# Patient Record
Sex: FEMALE | Race: WHITE | Employment: UNEMPLOYED | URBAN - METROPOLITAN AREA
[De-identification: names, ages, dates, MRNs, and addresses within clinical notes are randomized per-mention and may not be internally consistent; named-entity substitution may affect disease eponyms.]

---

## 2018-07-11 ENCOUNTER — HOSPITAL ENCOUNTER (EMERGENCY)
Age: 18
Discharge: HOME | End: 2018-07-11
Payer: MEDICARE
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
## 2018-08-01 ENCOUNTER — APPOINTMENT (OUTPATIENT)
Dept: GENERAL RADIOLOGY | Age: 18
End: 2018-08-01
Payer: MEDICARE

## 2018-08-01 ENCOUNTER — HOSPITAL ENCOUNTER (EMERGENCY)
Age: 18
Discharge: HOME | End: 2018-08-01
Payer: MEDICARE

## 2018-08-01 VITALS
OXYGEN SATURATION: 96 % | BODY MASS INDEX: 33.31 KG/M2 | RESPIRATION RATE: 16 BRPM | SYSTOLIC BLOOD PRESSURE: 113 MMHG | HEART RATE: 90 BPM | DIASTOLIC BLOOD PRESSURE: 61 MMHG | WEIGHT: 181 LBS | TEMPERATURE: 98 F | HEIGHT: 62 IN

## 2018-08-01 DIAGNOSIS — S82.202A CLOSED FRACTURE OF LEFT TIBIA AND FIBULA, INITIAL ENCOUNTER: Primary | ICD-10-CM

## 2018-08-01 DIAGNOSIS — S82.402A CLOSED FRACTURE OF LEFT TIBIA AND FIBULA, INITIAL ENCOUNTER: Primary | ICD-10-CM

## 2018-08-01 PROCEDURE — 99283 EMERGENCY DEPT VISIT LOW MDM: CPT

## 2018-08-01 PROCEDURE — 73610 X-RAY EXAM OF ANKLE: CPT

## 2018-08-01 PROCEDURE — 29515 APPLICATION SHORT LEG SPLINT: CPT

## 2018-08-01 ASSESSMENT — PAIN DESCRIPTION - PAIN TYPE: TYPE: ACUTE PAIN

## 2018-08-01 ASSESSMENT — PAIN DESCRIPTION - DESCRIPTORS: DESCRIPTORS: ACHING

## 2018-08-01 ASSESSMENT — PAIN SCALES - GENERAL: PAINLEVEL_OUTOF10: 7

## 2018-08-01 ASSESSMENT — PAIN DESCRIPTION - LOCATION: LOCATION: ANKLE

## 2018-08-01 ASSESSMENT — PAIN DESCRIPTION - ORIENTATION: ORIENTATION: LEFT

## 2018-08-01 NOTE — ED NOTES
Discharge instructions given, medications and follow up instructions reviewed. Patient verbalized understanding, no other noted or stated problems at this time. Patient will follow up with physicians as directed.       Minal Sanchez RN  08/01/18 1448

## 2018-12-17 ENCOUNTER — HOSPITAL ENCOUNTER (EMERGENCY)
Age: 18
Discharge: HOME | End: 2018-12-17
Payer: MEDICARE

## 2018-12-17 ENCOUNTER — APPOINTMENT (OUTPATIENT)
Dept: GENERAL RADIOLOGY | Age: 18
End: 2018-12-17
Payer: MEDICARE

## 2018-12-17 VITALS
HEIGHT: 63 IN | HEART RATE: 86 BPM | WEIGHT: 160 LBS | OXYGEN SATURATION: 98 % | SYSTOLIC BLOOD PRESSURE: 104 MMHG | BODY MASS INDEX: 28.35 KG/M2 | DIASTOLIC BLOOD PRESSURE: 78 MMHG | RESPIRATION RATE: 16 BRPM | TEMPERATURE: 97.9 F

## 2018-12-17 DIAGNOSIS — R07.89 CHEST WALL PAIN: Primary | ICD-10-CM

## 2018-12-17 DIAGNOSIS — R05.9 COUGH: ICD-10-CM

## 2018-12-17 LAB
EKG ATRIAL RATE: 76 BPM
EKG P AXIS: 35 DEGREES
EKG P-R INTERVAL: 128 MS
EKG Q-T INTERVAL: 404 MS
EKG QRS DURATION: 92 MS
EKG QTC CALCULATION (BAZETT): 454 MS
EKG R AXIS: 50 DEGREES
EKG T AXIS: 52 DEGREES
EKG VENTRICULAR RATE: 76 BPM
HCG(URINE) PREGNANCY TEST: NEGATIVE

## 2018-12-17 PROCEDURE — 71046 X-RAY EXAM CHEST 2 VIEWS: CPT

## 2018-12-17 PROCEDURE — 81025 URINE PREGNANCY TEST: CPT

## 2018-12-17 PROCEDURE — 6360000002 HC RX W HCPCS: Performed by: PHYSICIAN ASSISTANT

## 2018-12-17 PROCEDURE — 96372 THER/PROPH/DIAG INJ SC/IM: CPT

## 2018-12-17 PROCEDURE — 99285 EMERGENCY DEPT VISIT HI MDM: CPT

## 2018-12-17 RX ORDER — KETOROLAC TROMETHAMINE 30 MG/ML
30 INJECTION, SOLUTION INTRAMUSCULAR; INTRAVENOUS ONCE
Status: COMPLETED | OUTPATIENT
Start: 2018-12-17 | End: 2018-12-17

## 2018-12-17 RX ORDER — LEVALBUTEROL INHALATION SOLUTION 0.63 MG/3ML
0.63 SOLUTION RESPIRATORY (INHALATION) ONCE
Status: COMPLETED | OUTPATIENT
Start: 2018-12-17 | End: 2018-12-17

## 2018-12-17 RX ORDER — BENZONATATE 100 MG/1
100 CAPSULE ORAL 3 TIMES DAILY PRN
Qty: 20 CAPSULE | Refills: 0 | Status: SHIPPED | OUTPATIENT
Start: 2018-12-17 | End: 2018-12-24

## 2018-12-17 RX ORDER — NAPROXEN 500 MG/1
500 TABLET ORAL 2 TIMES DAILY
Qty: 20 TABLET | Refills: 0 | Status: SHIPPED | OUTPATIENT
Start: 2018-12-17 | End: 2020-06-16

## 2018-12-17 RX ORDER — LEVALBUTEROL INHALATION SOLUTION 1.25 MG/3ML
SOLUTION RESPIRATORY (INHALATION)
Status: DISCONTINUED
Start: 2018-12-17 | End: 2018-12-17 | Stop reason: HOSPADM

## 2018-12-17 RX ADMIN — KETOROLAC TROMETHAMINE 30 MG: 30 INJECTION, SOLUTION INTRAMUSCULAR at 21:10

## 2018-12-17 RX ADMIN — LEVALBUTEROL HYDROCHLORIDE 0.63 MG: 0.63 SOLUTION RESPIRATORY (INHALATION) at 20:55

## 2018-12-17 ASSESSMENT — PAIN DESCRIPTION - DESCRIPTORS: DESCRIPTORS: SHARP;BURNING

## 2018-12-17 ASSESSMENT — PAIN SCALES - GENERAL
PAINLEVEL_OUTOF10: 6
PAINLEVEL_OUTOF10: 6

## 2018-12-17 ASSESSMENT — PAIN DESCRIPTION - LOCATION: LOCATION: CHEST

## 2018-12-17 ASSESSMENT — PAIN DESCRIPTION - PAIN TYPE: TYPE: ACUTE PAIN

## 2018-12-18 NOTE — ED PROVIDER NOTES
4. No pitting edema to lower extremities bilaterally. Upper and lower extremities warm to touch. No bilateral posterior calf tenderness to palpation. Warm and well perfused  Skin: warm and dry without rash  Neurologic: GCS 15  Psych: Normal Affect      ------------------------------ ED COURSE/MEDICAL DECISION MAKING----------------------  Medications   levalbuterol (XOPENEX) 1.25 MG/3ML nebulizer solution (not administered)   levalbuterol (XOPENEX) nebulization 0.63 mg (0.63 mg Nebulization Given 12/17/18 2055)   ketorolac (TORADOL) injection 30 mg (30 mg Intramuscular Given 12/17/18 2110)       Medical Decision Making:    Patient to ER with complaints of anterior chest discomfort to local palpation onset around 3 PM today when she awoke from sleep. Patient advised would recommend EKG, urine pregnancy test this patient cannot be sure whether or not she is pregnant. I did tell patient I would like to give her a nebulizer treatment, she states that she thinks she is allergic to regular albuterol- it gives her nausea and vomiting. Patient advised if negative pregnancy test, will check 2 view CXR and consider IM Toradol. EKG done and stable/normal.  Patient getting nebulizer treatment on my reevaluation. Patient still having some palpable anterior chest soreness. Patient advised that EKG is stable and her pregnancy test is negative. Will check 2 view chest x-ray as well as give dose of IM Toradol now. 9:30PM Patient advised of stable CXR findings. Patient received some relief from Toradol given in ER. Will give Rx for Tessalon perles and Naprosyn, take both as needed. Recommend to establish with PCP and patient advised to stop smoking. Patient aware that her current symptoms do not appear from her heart, but appear from the muscles in her chest- most likely due to her recent mild cough. Counseling:    The emergency provider has spoken with the patient and discussed todays results, in addition to

## 2020-05-28 NOTE — ED PROVIDER NOTES
[facility]         Department of Emergency Medicine   ED  Provider Note  Admit Date/RoomTime: 8/1/2018 11:49 AM  ED Room: 24/24  Chief Complaint   Ankle Pain (was dx with fx 7/12 by [facility] and took off soft splint yesterday due to pain)    History of Present Illness   Source of history provided by:  patient. History/Exam Limitations: none. Kirk Brink is a 21 y.o. old female who has a past medical history of:   Past Medical History:   Diagnosis Date    ADHD     Autism     presents to the emergency department , for Left foot and ankle pain which occured 7/12/18 as result of a fall. She was taken to [facility] ER where xrays revealed a distal fibular fracture, was placed in a posteror splint, crutches and sent home with Ultram.  She was referred to Dr. Ava Angulo who she claims would not accept her unless she was referred by a primary care provider. Today she sts her leg hurt so bad she removed her splint and called 911. She denies any new trauma since her original injury. ROS    Pertinent positives and negatives are stated within HPI, all other systems reviewed and are negative. History reviewed. No pertinent surgical history. Social History:  reports that she has never smoked. She has never used smokeless tobacco. She reports that she does not drink alcohol or use drugs. Family History: family history is not on file. Allergies: Patient has no known allergies. Physical Exam           ED Triage Vitals [08/01/18 1148]   BP Temp Temp Source Pulse Resp SpO2 Height Weight   113/61 98 °F (36.7 °C) Oral 90 16 96 % 5' 2\" (1.575 m) 181 lb (82.1 kg)      Oxygen Saturation Interpretation: Normal.    · Constitutional:  Alert, development consistent with age. · HEENT:  NC/NT. Airway patent. · Neck:  Normal ROM. Supple. · Extremity(s):  Left: lateral mal..                Tenderness:  moderate. Swelling: Mild. Calf:  Calf/Ankle edema is present. .              Deformity: No.
Home

## 2020-06-16 ENCOUNTER — HOSPITAL ENCOUNTER (EMERGENCY)
Age: 20
Discharge: HOME | End: 2020-06-16
Attending: EMERGENCY MEDICINE
Payer: MEDICAID

## 2020-06-16 ENCOUNTER — HOSPITAL ENCOUNTER (EMERGENCY)
Age: 20
Discharge: LEFT BEFORE DISCHARGE | End: 2020-06-16
Attending: EMERGENCY MEDICINE
Payer: MEDICAID

## 2020-06-16 VITALS
RESPIRATION RATE: 16 BRPM | HEIGHT: 62 IN | SYSTOLIC BLOOD PRESSURE: 122 MMHG | BODY MASS INDEX: 26.2 KG/M2 | HEART RATE: 120 BPM | OXYGEN SATURATION: 97 % | DIASTOLIC BLOOD PRESSURE: 66 MMHG | WEIGHT: 142.38 LBS | TEMPERATURE: 97.9 F

## 2020-06-16 VITALS
TEMPERATURE: 98.9 F | DIASTOLIC BLOOD PRESSURE: 73 MMHG | WEIGHT: 160 LBS | BODY MASS INDEX: 28.34 KG/M2 | RESPIRATION RATE: 14 BRPM | HEART RATE: 120 BPM | OXYGEN SATURATION: 96 % | SYSTOLIC BLOOD PRESSURE: 133 MMHG

## 2020-06-16 LAB
ACETAMINOPHEN LEVEL: <5 MCG/ML (ref 10–30)
ALBUMIN SERPL-MCNC: 4.4 G/DL (ref 3.5–5.2)
ALP BLD-CCNC: 46 U/L (ref 35–104)
ALT SERPL-CCNC: 14 U/L (ref 0–32)
AMPHETAMINE SCREEN, URINE: NOT DETECTED
ANION GAP SERPL CALCULATED.3IONS-SCNC: 12 MMOL/L (ref 7–16)
AST SERPL-CCNC: 20 U/L (ref 0–31)
BARBITURATE SCREEN URINE: NOT DETECTED
BASOPHILS ABSOLUTE: 0.03 E9/L (ref 0–0.2)
BASOPHILS RELATIVE PERCENT: 0.3 % (ref 0–2)
BENZODIAZEPINE SCREEN, URINE: NOT DETECTED
BILIRUB SERPL-MCNC: 0.4 MG/DL (ref 0–1.2)
BUN BLDV-MCNC: 11 MG/DL (ref 6–20)
CALCIUM SERPL-MCNC: 9.4 MG/DL (ref 8.6–10.2)
CANNABINOID SCREEN URINE: POSITIVE
CHLORIDE BLD-SCNC: 104 MMOL/L (ref 98–107)
CO2: 22 MMOL/L (ref 22–29)
COCAINE METABOLITE SCREEN URINE: NOT DETECTED
CREAT SERPL-MCNC: 0.6 MG/DL (ref 0.5–1)
EOSINOPHILS ABSOLUTE: 0.08 E9/L (ref 0.05–0.5)
EOSINOPHILS RELATIVE PERCENT: 0.9 % (ref 0–6)
ETHANOL: <10 MG/DL (ref 0–0.08)
FENTANYL SCREEN, URINE: NOT DETECTED
GFR AFRICAN AMERICAN: >60
GFR NON-AFRICAN AMERICAN: >60 ML/MIN/1.73
GLUCOSE BLD-MCNC: 113 MG/DL (ref 74–99)
HCG, URINE, POC: NEGATIVE
HCT VFR BLD CALC: 40.7 % (ref 34–48)
HEMOGLOBIN: 13.4 G/DL (ref 11.5–15.5)
IMMATURE GRANULOCYTES #: 0.05 E9/L
IMMATURE GRANULOCYTES %: 0.5 % (ref 0–5)
LYMPHOCYTES ABSOLUTE: 2.09 E9/L (ref 1.5–4)
LYMPHOCYTES RELATIVE PERCENT: 22.4 % (ref 20–42)
Lab: ABNORMAL
Lab: NORMAL
MCH RBC QN AUTO: 28.2 PG (ref 26–35)
MCHC RBC AUTO-ENTMCNC: 32.9 % (ref 32–34.5)
MCV RBC AUTO: 85.5 FL (ref 80–99.9)
METHADONE SCREEN, URINE: NOT DETECTED
MONOCYTES ABSOLUTE: 0.35 E9/L (ref 0.1–0.95)
MONOCYTES RELATIVE PERCENT: 3.8 % (ref 2–12)
NEGATIVE QC PASS/FAIL: NORMAL
NEUTROPHILS ABSOLUTE: 6.72 E9/L (ref 1.8–7.3)
NEUTROPHILS RELATIVE PERCENT: 72.1 % (ref 43–80)
OPIATE SCREEN URINE: NOT DETECTED
OXYCODONE URINE: NOT DETECTED
PDW BLD-RTO: 13.9 FL (ref 11.5–15)
PHENCYCLIDINE SCREEN URINE: NOT DETECTED
PLATELET # BLD: 266 E9/L (ref 130–450)
PMV BLD AUTO: 10.3 FL (ref 7–12)
POSITIVE QC PASS/FAIL: NORMAL
POTASSIUM SERPL-SCNC: 3.7 MMOL/L (ref 3.5–5)
RBC # BLD: 4.76 E12/L (ref 3.5–5.5)
SALICYLATE, SERUM: <0.3 MG/DL (ref 0–30)
SODIUM BLD-SCNC: 138 MMOL/L (ref 132–146)
TOTAL PROTEIN: 7.7 G/DL (ref 6.4–8.3)
TRICYCLIC ANTIDEPRESSANTS SCREEN SERUM: NEGATIVE NG/ML
WBC # BLD: 9.3 E9/L (ref 4.5–11.5)

## 2020-06-16 PROCEDURE — 80307 DRUG TEST PRSMV CHEM ANLYZR: CPT

## 2020-06-16 PROCEDURE — 99284 EMERGENCY DEPT VISIT MOD MDM: CPT

## 2020-06-16 PROCEDURE — G0480 DRUG TEST DEF 1-7 CLASSES: HCPCS

## 2020-06-16 PROCEDURE — 80053 COMPREHEN METABOLIC PANEL: CPT

## 2020-06-16 PROCEDURE — 85025 COMPLETE CBC W/AUTO DIFF WBC: CPT

## 2020-06-16 NOTE — ED PROVIDER NOTES
well perfused, no clubbing, cyanosis, or edema. Capillary refill <3 seconds  Skin: warm and dry without rash  Neurologic: GCS 15,  Psych: calm  Affect      ------------------------------------------ PROGRESS NOTES ------------------------------------------     Consultations:   Social work     Re-Evaluations:        Counseling: The emergency provider has spoken with thepatient and discussed todays results, in addition to providing specific details for the plan of care and counseling regarding the diagnosis and prognosis. Questions are answered at this time and they are agreeable with the plan.      --------------------------------- ADDITIONAL PROVIDER NOTES ---------------------------------     This patient's ED course included: a personal history and physicial eaxmination, multiple bedside re-evaluations, IV medications, cardiac monitoring, continuous pulse oximetry and complex medical decision making and emergency management    This patient has been closely monitored during their ED course.     --------------------------------- IMPRESSION AND DISPOSITION ---------------------------------    IMPRESSION  1. Polysubstance abuse ([facility] [address]        DISPOSITION  Disposition: as per consultation - medically clear for admission to psychiatric facility  Patient condition is stable    [unfilled]     Please note this note was transcribed using voice recognition software.  Every effort was to ensure accuracy however inadvertent transcription errors may be present       Ganesh Gómez DO  06/16/20 1170
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
## 2020-07-01 ENCOUNTER — HOSPITAL ENCOUNTER (OUTPATIENT)
Age: 20
Discharge: HOME | End: 2020-07-01
Payer: MEDICAID

## 2020-07-01 LAB
ALBUMIN SERPL-MCNC: 4.6 G/DL (ref 3.5–5.2)
ALP BLD-CCNC: 51 U/L (ref 35–104)
ALT SERPL-CCNC: 12 U/L (ref 0–32)
AMPHETAMINE SCREEN, URINE: NOT DETECTED
ANION GAP SERPL CALCULATED.3IONS-SCNC: 13 MMOL/L (ref 7–16)
AST SERPL-CCNC: 17 U/L (ref 0–31)
BARBITURATE SCREEN URINE: NOT DETECTED
BASOPHILS ABSOLUTE: 0.04 E9/L (ref 0–0.2)
BASOPHILS RELATIVE PERCENT: 0.5 % (ref 0–2)
BENZODIAZEPINE SCREEN, URINE: NOT DETECTED
BILIRUB SERPL-MCNC: 0.4 MG/DL (ref 0–1.2)
BUN BLDV-MCNC: 10 MG/DL (ref 6–20)
CALCIUM SERPL-MCNC: 9.6 MG/DL (ref 8.6–10.2)
CANNABINOID SCREEN URINE: POSITIVE
CHLORIDE BLD-SCNC: 100 MMOL/L (ref 98–107)
CO2: 25 MMOL/L (ref 22–29)
COCAINE METABOLITE SCREEN URINE: NOT DETECTED
CREAT SERPL-MCNC: 0.6 MG/DL (ref 0.5–1)
EOSINOPHILS ABSOLUTE: 0.07 E9/L (ref 0.05–0.5)
EOSINOPHILS RELATIVE PERCENT: 0.9 % (ref 0–6)
FENTANYL SCREEN, URINE: NOT DETECTED
GFR AFRICAN AMERICAN: >60
GFR NON-AFRICAN AMERICAN: >60 ML/MIN/1.73
GLUCOSE FASTING: 95 MG/DL (ref 74–99)
HCG QUALITATIVE: NEGATIVE
HCT VFR BLD CALC: 40.9 % (ref 34–48)
HEMOGLOBIN: 13.8 G/DL (ref 11.5–15.5)
IMMATURE GRANULOCYTES #: 0.03 E9/L
IMMATURE GRANULOCYTES %: 0.4 % (ref 0–5)
LYMPHOCYTES ABSOLUTE: 2.26 E9/L (ref 1.5–4)
LYMPHOCYTES RELATIVE PERCENT: 28.4 % (ref 20–42)
Lab: ABNORMAL
MCH RBC QN AUTO: 29.3 PG (ref 26–35)
MCHC RBC AUTO-ENTMCNC: 33.7 % (ref 32–34.5)
MCV RBC AUTO: 86.8 FL (ref 80–99.9)
METHADONE SCREEN, URINE: NOT DETECTED
MONOCYTES ABSOLUTE: 0.35 E9/L (ref 0.1–0.95)
MONOCYTES RELATIVE PERCENT: 4.4 % (ref 2–12)
NEUTROPHILS ABSOLUTE: 5.2 E9/L (ref 1.8–7.3)
NEUTROPHILS RELATIVE PERCENT: 65.4 % (ref 43–80)
OPIATE SCREEN URINE: NOT DETECTED
OXYCODONE URINE: NOT DETECTED
PDW BLD-RTO: 13.2 FL (ref 11.5–15)
PHENCYCLIDINE SCREEN URINE: NOT DETECTED
PLATELET # BLD: 213 E9/L (ref 130–450)
PMV BLD AUTO: 10.3 FL (ref 7–12)
POTASSIUM SERPL-SCNC: 4 MMOL/L (ref 3.5–5)
RBC # BLD: 4.71 E12/L (ref 3.5–5.5)
SODIUM BLD-SCNC: 138 MMOL/L (ref 132–146)
TOTAL PROTEIN: 7.6 G/DL (ref 6.4–8.3)
TSH SERPL DL<=0.05 MIU/L-ACNC: 1.69 UIU/ML (ref 0.27–4.2)
WBC # BLD: 8 E9/L (ref 4.5–11.5)

## 2020-07-01 PROCEDURE — 85025 COMPLETE CBC W/AUTO DIFF WBC: CPT

## 2020-07-01 PROCEDURE — 80307 DRUG TEST PRSMV CHEM ANLYZR: CPT

## 2020-07-01 PROCEDURE — 86803 HEPATITIS C AB TEST: CPT

## 2020-07-01 PROCEDURE — 86703 HIV-1/HIV-2 1 RESULT ANTBDY: CPT

## 2020-07-01 PROCEDURE — 84443 ASSAY THYROID STIM HORMONE: CPT

## 2020-07-01 PROCEDURE — 84703 CHORIONIC GONADOTROPIN ASSAY: CPT

## 2020-07-01 PROCEDURE — 80053 COMPREHEN METABOLIC PANEL: CPT

## 2020-07-01 PROCEDURE — 36415 COLL VENOUS BLD VENIPUNCTURE: CPT

## 2020-07-01 PROCEDURE — G0480 DRUG TEST DEF 1-7 CLASSES: HCPCS

## 2020-07-02 LAB
HEPATITIS C ANTIBODY INTERPRETATION: NORMAL
HIV-1 AND HIV-2 ANTIBODIES: NORMAL

## 2020-07-04 LAB
Lab: NORMAL
REPORT: NORMAL
THIS TEST SENT TO: NORMAL

## 2020-07-09 LAB — CANNABINOIDS CONF, URINE: 147 NG/ML

## 2020-07-19 ENCOUNTER — HOSPITAL ENCOUNTER (EMERGENCY)
Age: 20
Discharge: ANOTHER HOSPITAL | End: 2020-07-20
Attending: EMERGENCY MEDICINE
Payer: MEDICAID

## 2020-07-19 LAB
ACETAMINOPHEN LEVEL: <5 MCG/ML (ref 10–30)
ALBUMIN SERPL-MCNC: 4.4 G/DL (ref 3.5–5.2)
ALP BLD-CCNC: 49 U/L (ref 35–104)
ALT SERPL-CCNC: 10 U/L (ref 0–32)
AMPHETAMINE SCREEN, URINE: NOT DETECTED
ANION GAP SERPL CALCULATED.3IONS-SCNC: 16 MMOL/L (ref 7–16)
AST SERPL-CCNC: 19 U/L (ref 0–31)
BACTERIA: ABNORMAL /HPF
BARBITURATE SCREEN URINE: NOT DETECTED
BASOPHILS ABSOLUTE: 0.02 E9/L (ref 0–0.2)
BASOPHILS RELATIVE PERCENT: 0.3 % (ref 0–2)
BENZODIAZEPINE SCREEN, URINE: NOT DETECTED
BILIRUB SERPL-MCNC: 0.2 MG/DL (ref 0–1.2)
BILIRUBIN URINE: NEGATIVE
BLOOD, URINE: ABNORMAL
BUN BLDV-MCNC: 7 MG/DL (ref 6–20)
CALCIUM SERPL-MCNC: 9.1 MG/DL (ref 8.6–10.2)
CANNABINOID SCREEN URINE: POSITIVE
CHLORIDE BLD-SCNC: 107 MMOL/L (ref 98–107)
CLARITY: CLEAR
CO2: 20 MMOL/L (ref 22–29)
COCAINE METABOLITE SCREEN URINE: NOT DETECTED
COLOR: YELLOW
CREAT SERPL-MCNC: 0.6 MG/DL (ref 0.5–1)
EKG ATRIAL RATE: 72 BPM
EKG P AXIS: 51 DEGREES
EKG P-R INTERVAL: 126 MS
EKG Q-T INTERVAL: 432 MS
EKG QRS DURATION: 90 MS
EKG QTC CALCULATION (BAZETT): 473 MS
EKG R AXIS: 64 DEGREES
EKG T AXIS: 67 DEGREES
EKG VENTRICULAR RATE: 72 BPM
EOSINOPHILS ABSOLUTE: 0.03 E9/L (ref 0.05–0.5)
EOSINOPHILS RELATIVE PERCENT: 0.5 % (ref 0–6)
EPITHELIAL CELLS, UA: ABNORMAL /HPF
ETHANOL: 107 MG/DL (ref 0–0.08)
ETHANOL: 228 MG/DL (ref 0–0.08)
FENTANYL SCREEN, URINE: NOT DETECTED
GFR AFRICAN AMERICAN: >60
GFR NON-AFRICAN AMERICAN: >60 ML/MIN/1.73
GLUCOSE BLD-MCNC: 119 MG/DL (ref 74–99)
GLUCOSE URINE: NEGATIVE MG/DL
HCG, URINE, POC: NEGATIVE
HCT VFR BLD CALC: 36.4 % (ref 34–48)
HEMOGLOBIN: 12.4 G/DL (ref 11.5–15.5)
IMMATURE GRANULOCYTES #: 0.04 E9/L
IMMATURE GRANULOCYTES %: 0.6 % (ref 0–5)
KETONES, URINE: NEGATIVE MG/DL
LEUKOCYTE ESTERASE, URINE: ABNORMAL
LYMPHOCYTES ABSOLUTE: 1.65 E9/L (ref 1.5–4)
LYMPHOCYTES RELATIVE PERCENT: 26.6 % (ref 20–42)
Lab: ABNORMAL
Lab: NORMAL
MAGNESIUM: 2.1 MG/DL (ref 1.6–2.6)
MCH RBC QN AUTO: 29 PG (ref 26–35)
MCHC RBC AUTO-ENTMCNC: 34.1 % (ref 32–34.5)
MCV RBC AUTO: 85.2 FL (ref 80–99.9)
METHADONE SCREEN, URINE: NOT DETECTED
MONOCYTES ABSOLUTE: 0.25 E9/L (ref 0.1–0.95)
MONOCYTES RELATIVE PERCENT: 4 % (ref 2–12)
NEGATIVE QC PASS/FAIL: NORMAL
NEUTROPHILS ABSOLUTE: 4.21 E9/L (ref 1.8–7.3)
NEUTROPHILS RELATIVE PERCENT: 68 % (ref 43–80)
NITRITE, URINE: NEGATIVE
OPIATE SCREEN URINE: NOT DETECTED
OXYCODONE URINE: NOT DETECTED
PDW BLD-RTO: 13.2 FL (ref 11.5–15)
PH UA: 5.5 (ref 5–9)
PHENCYCLIDINE SCREEN URINE: NOT DETECTED
PLATELET # BLD: 207 E9/L (ref 130–450)
PMV BLD AUTO: 10.6 FL (ref 7–12)
POSITIVE QC PASS/FAIL: NORMAL
POTASSIUM REFLEX MAGNESIUM: 3.4 MMOL/L (ref 3.5–5)
PROTEIN UA: NEGATIVE MG/DL
RBC # BLD: 4.27 E12/L (ref 3.5–5.5)
RBC UA: ABNORMAL /HPF (ref 0–2)
SALICYLATE, SERUM: <0.3 MG/DL (ref 0–30)
SARS-COV-2, NAAT: NOT DETECTED
SODIUM BLD-SCNC: 143 MMOL/L (ref 132–146)
SPECIFIC GRAVITY UA: 1.01 (ref 1–1.03)
TOTAL PROTEIN: 6.9 G/DL (ref 6.4–8.3)
TRICHOMONAS: PRESENT /HPF
TRICYCLIC ANTIDEPRESSANTS SCREEN SERUM: NEGATIVE NG/ML
UROBILINOGEN, URINE: 0.2 E.U./DL
WBC # BLD: 6.2 E9/L (ref 4.5–11.5)
WBC UA: ABNORMAL /HPF (ref 0–5)

## 2020-07-19 PROCEDURE — 93005 ELECTROCARDIOGRAM TRACING: CPT | Performed by: EMERGENCY MEDICINE

## 2020-07-19 PROCEDURE — 80307 DRUG TEST PRSMV CHEM ANLYZR: CPT

## 2020-07-19 PROCEDURE — U0002 COVID-19 LAB TEST NON-CDC: HCPCS

## 2020-07-19 PROCEDURE — 83735 ASSAY OF MAGNESIUM: CPT

## 2020-07-19 PROCEDURE — 99284 EMERGENCY DEPT VISIT MOD MDM: CPT

## 2020-07-19 PROCEDURE — 96372 THER/PROPH/DIAG INJ SC/IM: CPT

## 2020-07-19 PROCEDURE — G0480 DRUG TEST DEF 1-7 CLASSES: HCPCS

## 2020-07-19 PROCEDURE — 81001 URINALYSIS AUTO W/SCOPE: CPT

## 2020-07-19 PROCEDURE — 85025 COMPLETE CBC W/AUTO DIFF WBC: CPT

## 2020-07-19 PROCEDURE — 6360000002 HC RX W HCPCS: Performed by: EMERGENCY MEDICINE

## 2020-07-19 PROCEDURE — 80053 COMPREHEN METABOLIC PANEL: CPT

## 2020-07-19 RX ORDER — FLUOXETINE HYDROCHLORIDE 20 MG/1
20 CAPSULE ORAL DAILY
Status: ON HOLD | COMMUNITY
End: 2020-08-12 | Stop reason: ALTCHOICE

## 2020-07-19 RX ORDER — DROPERIDOL 2.5 MG/ML
1.25 INJECTION, SOLUTION INTRAMUSCULAR; INTRAVENOUS ONCE
Status: COMPLETED | OUTPATIENT
Start: 2020-07-19 | End: 2020-07-19

## 2020-07-19 RX ORDER — LANOLIN ALCOHOL/MO/W.PET/CERES
10 CREAM (GRAM) TOPICAL NIGHTLY PRN
COMMUNITY
End: 2021-06-09 | Stop reason: ALTCHOICE

## 2020-07-19 RX ADMIN — DROPERIDOL 1.25 MG: 2.5 INJECTION, SOLUTION INTRAMUSCULAR; INTRAVENOUS at 07:28

## 2020-07-19 ASSESSMENT — ENCOUNTER SYMPTOMS
COUGH: 0
CHEST TIGHTNESS: 0
SHORTNESS OF BREATH: 0
NAUSEA: 0
RHINORRHEA: 0
CONSTIPATION: 0
COLOR CHANGE: 0
EYE REDNESS: 0
DIARRHEA: 0
FACIAL SWELLING: 0
PHOTOPHOBIA: 0
EYE PAIN: 0
ABDOMINAL DISTENTION: 0

## 2020-07-19 NOTE — ED NOTES
Pt remains calm and cooperative, no distress noted, sitter remains at bed side, call light in reach, safety maintained, will continue to monitor      Orlean Shone, RN  07/19/20 0168

## 2020-07-19 NOTE — ED NOTES
Decision to refer, call to [facility], spoke with Ratna Marshall, completed referral, reported at this point they are unaware of opening, will try [facility] and [facility] initially as they are full hospital systems and more likely to accept [facility].   Awaiting call back re: status of referral.      CLIFTON Hobbs, [facility]  07/19/20 2001 [facility], [place]W, [facility]  07/19/20 2009
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
## 2020-07-19 NOTE — ED PROVIDER NOTES
Pt sign out social work pending, eval, resting comfortably, no change, social work eval pending       Corey Spatz, MD  07/19/20 5263

## 2020-07-19 NOTE — ED NOTES
Fax to nursing office for [facility] [address] [place] [address], [place]  07/19/20 9243
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
## 2020-07-19 NOTE — ED NOTES
Call to [facility] Kary, spoke with [facility], [employer] reports Nura Guerrier in [facility] [employer] OF [employer], [place], [address], [address], [facility] [address] as well as [address] all full, [facility] does not accept [facility]. Inés reports at this point they have no facilities to refer to. SW reviewed chart and multiple notes. It appears pt, although not voicing suicidal or homicidal ideation or intent, is significantly impaired and in need of assessment for safety. Spoke to charge nurse Imer Cowart about moving pt to [address] so that in-patient care can continued to be pursued.        [address] [place] [address], [facility], [facility] [place]  07/19/20 5227 [facility], [place]  07/19/20 [facility]

## 2020-07-19 NOTE — ED NOTES
Patient changed into appropriate gown.   Patient has one bag of personal belongings in [address]  07/19/20 0938

## 2020-07-19 NOTE — ED NOTES
Pt has [place] and cannot be referred to [facility], [facility] or [address] [place] [address] has no beds. Pt will soon be referred to the [facility] for placement.      ANNIE Matos  07/19/20 102
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
## 2020-07-20 VITALS
DIASTOLIC BLOOD PRESSURE: 81 MMHG | WEIGHT: 142 LBS | SYSTOLIC BLOOD PRESSURE: 125 MMHG | HEIGHT: 63 IN | BODY MASS INDEX: 25.16 KG/M2 | RESPIRATION RATE: 16 BRPM | HEART RATE: 91 BPM | TEMPERATURE: 98.5 F | OXYGEN SATURATION: 98 %

## 2020-07-20 NOTE — ED NOTES
PT HAS BEEN ACCEPTED TO [facility] BY  [facility] SPECIALTY [facility]    PHYSICIANS WILL TRANSPORT BY 80    N2N [phone]     [place], [place]  07/20/20 1882

## 2020-08-11 ENCOUNTER — HOSPITAL ENCOUNTER (INPATIENT)
Age: 20
LOS: 1 days | Discharge: LEFT BEFORE DISCHARGE | DRG: 463 | End: 2020-08-13
Attending: EMERGENCY MEDICINE | Admitting: INTERNAL MEDICINE
Payer: MEDICAID

## 2020-08-11 ENCOUNTER — HOSPITAL ENCOUNTER (EMERGENCY)
Age: 20
Discharge: LEFT BEFORE DISCHARGE | End: 2020-08-11
Payer: COMMERCIAL

## 2020-08-11 ENCOUNTER — APPOINTMENT (OUTPATIENT)
Dept: GENERAL RADIOLOGY | Age: 20
DRG: 463 | End: 2020-08-11
Payer: MEDICAID

## 2020-08-11 ENCOUNTER — HOSPITAL ENCOUNTER (EMERGENCY)
Age: 20
Discharge: HOME | End: 2020-08-11
Payer: MEDICAID

## 2020-08-11 VITALS
HEIGHT: 63 IN | DIASTOLIC BLOOD PRESSURE: 53 MMHG | OXYGEN SATURATION: 94 % | RESPIRATION RATE: 20 BRPM | HEART RATE: 114 BPM | TEMPERATURE: 98.6 F | SYSTOLIC BLOOD PRESSURE: 88 MMHG | BODY MASS INDEX: 24.8 KG/M2 | WEIGHT: 140 LBS

## 2020-08-11 VITALS
BODY MASS INDEX: 24.99 KG/M2 | TEMPERATURE: 98.8 F | WEIGHT: 141.03 LBS | SYSTOLIC BLOOD PRESSURE: 108 MMHG | OXYGEN SATURATION: 95 % | DIASTOLIC BLOOD PRESSURE: 54 MMHG | RESPIRATION RATE: 14 BRPM | HEIGHT: 63 IN | HEART RATE: 108 BPM

## 2020-08-11 LAB
APTT: 28.7 SEC (ref 24.5–35.1)
BACTERIA: ABNORMAL /HPF
BASOPHILS ABSOLUTE: 0.03 E9/L (ref 0–0.2)
BASOPHILS RELATIVE PERCENT: 0.4 % (ref 0–2)
BILIRUBIN URINE: NEGATIVE
BLOOD, URINE: ABNORMAL
CLARITY: ABNORMAL
COLOR: YELLOW
D DIMER: 221 NG/ML DDU
EOSINOPHILS ABSOLUTE: 0.03 E9/L (ref 0.05–0.5)
EOSINOPHILS RELATIVE PERCENT: 0.4 % (ref 0–6)
EPITHELIAL CELLS, UA: ABNORMAL /HPF
GLUCOSE URINE: NEGATIVE MG/DL
HCG, URINE, POC: NEGATIVE
HCT VFR BLD CALC: 43.5 % (ref 34–48)
HEMOGLOBIN: 14.5 G/DL (ref 11.5–15.5)
IMMATURE GRANULOCYTES #: 0.03 E9/L
IMMATURE GRANULOCYTES %: 0.4 % (ref 0–5)
INR BLD: 1.2
KETONES, URINE: NEGATIVE MG/DL
LACTIC ACID, SEPSIS: 1.4 MMOL/L (ref 0.5–1.9)
LEUKOCYTE ESTERASE, URINE: ABNORMAL
LYMPHOCYTES ABSOLUTE: 2.14 E9/L (ref 1.5–4)
LYMPHOCYTES RELATIVE PERCENT: 30.2 % (ref 20–42)
Lab: NORMAL
MCH RBC QN AUTO: 28.2 PG (ref 26–35)
MCHC RBC AUTO-ENTMCNC: 33.3 % (ref 32–34.5)
MCV RBC AUTO: 84.6 FL (ref 80–99.9)
MONOCYTES ABSOLUTE: 0.33 E9/L (ref 0.1–0.95)
MONOCYTES RELATIVE PERCENT: 4.7 % (ref 2–12)
NEGATIVE QC PASS/FAIL: NORMAL
NEUTROPHILS ABSOLUTE: 4.52 E9/L (ref 1.8–7.3)
NEUTROPHILS RELATIVE PERCENT: 63.9 % (ref 43–80)
NITRITE, URINE: POSITIVE
PDW BLD-RTO: 12.7 FL (ref 11.5–15)
PH UA: 6.5 (ref 5–9)
PLATELET # BLD: 275 E9/L (ref 130–450)
PMV BLD AUTO: 10.1 FL (ref 7–12)
POSITIVE QC PASS/FAIL: NORMAL
PROTEIN UA: NEGATIVE MG/DL
PROTHROMBIN TIME: 13.7 SEC (ref 9.3–12.4)
RBC # BLD: 5.14 E12/L (ref 3.5–5.5)
RBC UA: >20 /HPF (ref 0–2)
SARS-COV-2, NAAT: NOT DETECTED
SPECIFIC GRAVITY UA: 1.01 (ref 1–1.03)
STREP GRP A PCR: NEGATIVE
TROPONIN: <0.01 NG/ML (ref 0–0.03)
UROBILINOGEN, URINE: 1 E.U./DL
WBC # BLD: 7.1 E9/L (ref 4.5–11.5)
WBC UA: >20 /HPF (ref 0–5)

## 2020-08-11 PROCEDURE — 96374 THER/PROPH/DIAG INJ IV PUSH: CPT

## 2020-08-11 PROCEDURE — 87880 STREP A ASSAY W/OPTIC: CPT

## 2020-08-11 PROCEDURE — 85025 COMPLETE CBC W/AUTO DIFF WBC: CPT

## 2020-08-11 PROCEDURE — 71045 X-RAY EXAM CHEST 1 VIEW: CPT

## 2020-08-11 PROCEDURE — 99212 OFFICE O/P EST SF 10 MIN: CPT

## 2020-08-11 PROCEDURE — 93005 ELECTROCARDIOGRAM TRACING: CPT | Performed by: PHYSICIAN ASSISTANT

## 2020-08-11 PROCEDURE — 96361 HYDRATE IV INFUSION ADD-ON: CPT

## 2020-08-11 PROCEDURE — 87150 DNA/RNA AMPLIFIED PROBE: CPT

## 2020-08-11 PROCEDURE — 83605 ASSAY OF LACTIC ACID: CPT

## 2020-08-11 PROCEDURE — 85730 THROMBOPLASTIN TIME PARTIAL: CPT

## 2020-08-11 PROCEDURE — 87077 CULTURE AEROBIC IDENTIFY: CPT

## 2020-08-11 PROCEDURE — 85610 PROTHROMBIN TIME: CPT

## 2020-08-11 PROCEDURE — U0002 COVID-19 LAB TEST NON-CDC: HCPCS

## 2020-08-11 PROCEDURE — 87186 SC STD MICRODIL/AGAR DIL: CPT

## 2020-08-11 PROCEDURE — 81001 URINALYSIS AUTO W/SCOPE: CPT

## 2020-08-11 PROCEDURE — 87088 URINE BACTERIA CULTURE: CPT

## 2020-08-11 PROCEDURE — 99284 EMERGENCY DEPT VISIT MOD MDM: CPT

## 2020-08-11 PROCEDURE — 85378 FIBRIN DEGRADE SEMIQUANT: CPT

## 2020-08-11 PROCEDURE — 6360000002 HC RX W HCPCS: Performed by: EMERGENCY MEDICINE

## 2020-08-11 PROCEDURE — 84484 ASSAY OF TROPONIN QUANT: CPT

## 2020-08-11 PROCEDURE — 87040 BLOOD CULTURE FOR BACTERIA: CPT

## 2020-08-11 PROCEDURE — 2580000003 HC RX 258: Performed by: EMERGENCY MEDICINE

## 2020-08-11 RX ORDER — 0.9 % SODIUM CHLORIDE 0.9 %
1000 INTRAVENOUS SOLUTION INTRAVENOUS ONCE
Status: COMPLETED | OUTPATIENT
Start: 2020-08-11 | End: 2020-08-12

## 2020-08-11 RX ADMIN — CEFTRIAXONE SODIUM 1 G: 1 INJECTION, POWDER, FOR SOLUTION INTRAMUSCULAR; INTRAVENOUS at 23:54

## 2020-08-11 RX ADMIN — SODIUM CHLORIDE 1000 ML: 9 INJECTION, SOLUTION INTRAVENOUS at 22:55

## 2020-08-11 ASSESSMENT — PAIN SCALES - GENERAL
PAINLEVEL_OUTOF10: 6
PAINLEVEL_OUTOF10: 6

## 2020-08-11 ASSESSMENT — PAIN DESCRIPTION - DESCRIPTORS: DESCRIPTORS: ACHING;DISCOMFORT

## 2020-08-11 ASSESSMENT — PAIN DESCRIPTION - LOCATION
LOCATION: HEAD;EAR
LOCATION: EAR

## 2020-08-11 ASSESSMENT — PAIN DESCRIPTION - PAIN TYPE
TYPE: ACUTE PAIN
TYPE: ACUTE PAIN

## 2020-08-11 ASSESSMENT — PAIN DESCRIPTION - PROGRESSION: CLINICAL_PROGRESSION: GRADUALLY WORSENING

## 2020-08-11 ASSESSMENT — PAIN - FUNCTIONAL ASSESSMENT: PAIN_FUNCTIONAL_ASSESSMENT: PREVENTS OR INTERFERES SOME ACTIVE ACTIVITIES AND ADLS

## 2020-08-11 ASSESSMENT — PAIN DESCRIPTION - ORIENTATION: ORIENTATION: RIGHT;LEFT

## 2020-08-11 ASSESSMENT — PAIN DESCRIPTION - FREQUENCY: FREQUENCY: CONTINUOUS

## 2020-08-11 NOTE — ED PROVIDER NOTES
This is a 22year old female that presents to urgent care with a complaint of earache, sore throat and a cough. She goes not on to says she has been short of breath and had a dizzy spell today. No LOC. Is a smoker. No abdominal pain, nausea, vomiting, diarrhea or urinary symptoms. Review of Systems   Constitutional:        Pertinent positives and negatives are stated within HPI, all other systems reviewed and are negative. Physical Exam  Vitals signs and nursing note reviewed. Constitutional:       Appearance: She is well-developed. HENT:      Head: Normocephalic and atraumatic. Right Ear: Hearing, tympanic membrane, ear canal and external ear normal.      Left Ear: Hearing, tympanic membrane, ear canal and external ear normal.      Nose: Nose normal.      Right Sinus: No maxillary sinus tenderness or frontal sinus tenderness. Left Sinus: No maxillary sinus tenderness or frontal sinus tenderness. Mouth/Throat:      Pharynx: Oropharynx is clear. Uvula midline. No pharyngeal swelling, oropharyngeal exudate or uvula swelling. Comments: Oropharynx is patent. Eyes:      General: Lids are normal.      Conjunctiva/sclera: Conjunctivae normal.      Pupils: Pupils are equal, round, and reactive to light. Neck:      Musculoskeletal: Full passive range of motion without pain, normal range of motion and neck supple. Cardiovascular:      Rate and Rhythm: Regular rhythm. Tachycardia present. Heart sounds: Normal heart sounds. No murmur. Pulmonary:      Effort: Pulmonary effort is normal.      Breath sounds: Decreased air movement present. Abdominal:      General: Bowel sounds are normal.      Palpations: Abdomen is soft. Abdomen is not rigid. Tenderness: There is no abdominal tenderness. There is no guarding or rebound. Skin:     General: Skin is warm and dry. Findings: No abrasion or rash.    Neurological:      Mental Status: She is alert and oriented to person, place, and time. GCS: GCS eye subscore is 4. GCS verbal subscore is 5. GCS motor subscore is 6. Cranial Nerves: No cranial nerve deficit. Sensory: Sensation is intact. No sensory deficit. Motor: Motor function is intact. Coordination: Coordination is intact. Coordination normal.      Gait: Gait is intact. Gait normal.         Procedures    MDM  Number of Diagnoses or Management Options  Dizziness:   Dyspnea, unspecified type:   General weakness:   Diagnosis management comments: No acute distress. Recommend patient go to ED to be worked up further. Sepsis? Had recent admission to hospital. COVID? Agrees to go. Friend to take her to ED. BP recheck and 89/54         --------------------------------------------- PAST HISTORY ---------------------------------------------  Past Medical History:  has a past medical history of ADHD, Autism, Bipolar 1 disorder ([facility] [address] and PTSD (post-traumatic stress disorder). Past Surgical History:  has no past surgical history on file. Social History:  reports that she has been smoking. She has never used smokeless tobacco. She reports current drug use. Drugs: Methamphetamines, Cocaine, Marijuana, and Opiates . She reports that she does not drink alcohol. Family History: family history is not on file. The patients home medications have been reviewed. Allergies: Patient has no known allergies. -------------------------------------------------- RESULTS -------------------------------------------------  No results found for this visit on 08/11/20. No orders to display       ------------------------- NURSING NOTES AND VITALS REVIEWED ---------------------------   The nursing notes within the ED encounter and vital signs as below have been reviewed.    BP (!) 88/53   Pulse 114   Temp 98.6 °F (37 °C) (Infrared)   Resp 20   Ht 5' 3\" (1.6 m)   Wt 140 lb (63.5 kg)   LMP 07/17/2020   SpO2 94%   BMI 24.80 kg/m²   Oxygen Saturation Interpretation: Normal      ------------------------------------------ PROGRESS NOTES ------------------------------------------   I have spoken with the patient and discussed todays results, in addition to providing specific details for the plan of care and counseling regarding the diagnosis and prognosis. Their questions are answered at this time and they are agreeable with the plan.      --------------------------------- ADDITIONAL PROVIDER NOTES ---------------------------------     This patient is stable for discharge. I have shared the specific conditions for return, as well as the importance of follow-up. * NOTE: This report was transcribed using voice recognition software. Every effort was made to ensure accuracy; however, inadvertent computerized transcription errors may be present.    --------------------------------- IMPRESSION AND DISPOSITION ---------------------------------    IMPRESSION  1. General weakness    2. Dyspnea, unspecified type    3.  Dizziness        DISPOSITION  Disposition: Discharge to ED  Patient condition is good                       Zach Post PA-C  08/11/20 152

## 2020-08-11 NOTE — ED TRIAGE NOTES
FIRST PROVIDER CONTACT ASSESSMENT NOTE      Department of Emergency Medicine   Admit Date: No admission date for patient encounter. Chief Complaint: No chief complaint on file. History of Present Illness:    Nicolle Silva is a 22 y.o. female who presents to the ED for cough, lightheadedness and fatigue.  Pt was sent from urgent care due to low pulse ox, hypotension and tachycardia.         -----------------END OF FIRST PROVIDER CONTACT ASSESSMENT NOTE--------------  Electronically signed by Shelby Bhakta PA-C   DD: 8/11/20

## 2020-08-12 ENCOUNTER — APPOINTMENT (OUTPATIENT)
Dept: CT IMAGING | Age: 20
DRG: 463 | End: 2020-08-12
Payer: MEDICAID

## 2020-08-12 PROBLEM — N39.0 UTI (URINARY TRACT INFECTION): Status: ACTIVE | Noted: 2020-08-12

## 2020-08-12 LAB
ALBUMIN SERPL-MCNC: 3.9 G/DL (ref 3.5–5.2)
ALP BLD-CCNC: 63 U/L (ref 35–104)
ALT SERPL-CCNC: 8 U/L (ref 0–32)
ANION GAP SERPL CALCULATED.3IONS-SCNC: 13 MMOL/L (ref 7–16)
ANION GAP SERPL CALCULATED.3IONS-SCNC: 14 MMOL/L (ref 7–16)
ANISOCYTOSIS: ABNORMAL
AST SERPL-CCNC: 14 U/L (ref 0–31)
B.E.: 1.9 MMOL/L (ref -3–3)
BASOPHILS ABSOLUTE: 0 E9/L (ref 0–0.2)
BASOPHILS RELATIVE PERCENT: 0.3 % (ref 0–2)
BILIRUB SERPL-MCNC: 0.3 MG/DL (ref 0–1.2)
BUN BLDV-MCNC: 5 MG/DL (ref 6–20)
BUN BLDV-MCNC: 6 MG/DL (ref 6–20)
CALCIUM SERPL-MCNC: 9 MG/DL (ref 8.6–10.2)
CALCIUM SERPL-MCNC: 9.1 MG/DL (ref 8.6–10.2)
CHLORIDE BLD-SCNC: 100 MMOL/L (ref 98–107)
CHLORIDE BLD-SCNC: 105 MMOL/L (ref 98–107)
CO2: 24 MMOL/L (ref 22–29)
CO2: 24 MMOL/L (ref 22–29)
COHB: 1.7 % (ref 0–1.5)
CREAT SERPL-MCNC: 0.6 MG/DL (ref 0.5–1)
CREAT SERPL-MCNC: 0.6 MG/DL (ref 0.5–1)
CRITICAL: ABNORMAL
DATE ANALYZED: ABNORMAL
DATE OF COLLECTION: ABNORMAL
EKG ATRIAL RATE: 97 BPM
EKG P AXIS: 22 DEGREES
EKG P-R INTERVAL: 126 MS
EKG Q-T INTERVAL: 348 MS
EKG QRS DURATION: 78 MS
EKG QTC CALCULATION (BAZETT): 441 MS
EKG R AXIS: 58 DEGREES
EKG T AXIS: 50 DEGREES
EKG VENTRICULAR RATE: 97 BPM
EOSINOPHILS ABSOLUTE: 0 E9/L (ref 0.05–0.5)
EOSINOPHILS RELATIVE PERCENT: 0.1 % (ref 0–6)
GFR AFRICAN AMERICAN: >60
GFR AFRICAN AMERICAN: >60
GFR NON-AFRICAN AMERICAN: >60 ML/MIN/1.73
GFR NON-AFRICAN AMERICAN: >60 ML/MIN/1.73
GLUCOSE BLD-MCNC: 103 MG/DL (ref 74–99)
GLUCOSE BLD-MCNC: 91 MG/DL (ref 74–99)
HCO3: 25.9 MMOL/L (ref 22–26)
HCT VFR BLD CALC: 39.1 % (ref 34–48)
HEMOGLOBIN: 13.1 G/DL (ref 11.5–15.5)
HHB: 4.4 % (ref 0–5)
LAB: ABNORMAL
LACTIC ACID, SEPSIS: 0.6 MMOL/L (ref 0.5–1.9)
LYMPHOCYTES ABSOLUTE: 1.01 E9/L (ref 1.5–4)
LYMPHOCYTES RELATIVE PERCENT: 13 % (ref 20–42)
Lab: ABNORMAL
MCH RBC QN AUTO: 28.2 PG (ref 26–35)
MCHC RBC AUTO-ENTMCNC: 33.5 % (ref 32–34.5)
MCV RBC AUTO: 84.1 FL (ref 80–99.9)
METHB: 0.3 % (ref 0–1.5)
MODE: ABNORMAL
MONOCYTES ABSOLUTE: 0 E9/L (ref 0.1–0.95)
MONOCYTES RELATIVE PERCENT: 1 % (ref 2–12)
NEUTROPHILS ABSOLUTE: 6.79 E9/L (ref 1.8–7.3)
NEUTROPHILS RELATIVE PERCENT: 87 % (ref 43–80)
O2 CONTENT: 17.3 ML/DL
O2 SATURATION: 95.5 % (ref 92–98.5)
O2HB: 93.6 % (ref 94–97)
OPERATOR ID: 2325
PATIENT TEMP: 37 C
PCO2: 38.3 MMHG (ref 35–45)
PDW BLD-RTO: 12.8 FL (ref 11.5–15)
PH BLOOD GAS: 7.45 (ref 7.35–7.45)
PLATELET # BLD: 212 E9/L (ref 130–450)
PMV BLD AUTO: 10.2 FL (ref 7–12)
PO2: 79 MMHG (ref 75–100)
POLYCHROMASIA: ABNORMAL
POTASSIUM REFLEX MAGNESIUM: 3.9 MMOL/L (ref 3.5–5)
POTASSIUM SERPL-SCNC: 3.5 MMOL/L (ref 3.5–5)
PROCALCITONIN: 0.5 NG/ML (ref 0–0.08)
RBC # BLD: 4.65 E12/L (ref 3.5–5.5)
SODIUM BLD-SCNC: 138 MMOL/L (ref 132–146)
SODIUM BLD-SCNC: 142 MMOL/L (ref 132–146)
SOURCE, BLOOD GAS: ABNORMAL
THB: 13.1 G/DL (ref 11.5–16.5)
TIME ANALYZED: 1
TOTAL PROTEIN: 6.8 G/DL (ref 6.4–8.3)
WBC # BLD: 7.8 E9/L (ref 4.5–11.5)

## 2020-08-12 PROCEDURE — 2580000003 HC RX 258: Performed by: EMERGENCY MEDICINE

## 2020-08-12 PROCEDURE — 2060000000 HC ICU INTERMEDIATE R&B

## 2020-08-12 PROCEDURE — 80053 COMPREHEN METABOLIC PANEL: CPT

## 2020-08-12 PROCEDURE — 84145 PROCALCITONIN (PCT): CPT

## 2020-08-12 PROCEDURE — 83605 ASSAY OF LACTIC ACID: CPT

## 2020-08-12 PROCEDURE — 6360000002 HC RX W HCPCS: Performed by: FAMILY MEDICINE

## 2020-08-12 PROCEDURE — 2580000003 HC RX 258: Performed by: FAMILY MEDICINE

## 2020-08-12 PROCEDURE — 80048 BASIC METABOLIC PNL TOTAL CA: CPT

## 2020-08-12 PROCEDURE — 74176 CT ABD & PELVIS W/O CONTRAST: CPT

## 2020-08-12 PROCEDURE — 36415 COLL VENOUS BLD VENIPUNCTURE: CPT

## 2020-08-12 PROCEDURE — 85025 COMPLETE CBC W/AUTO DIFF WBC: CPT

## 2020-08-12 PROCEDURE — 93010 ELECTROCARDIOGRAM REPORT: CPT | Performed by: INTERNAL MEDICINE

## 2020-08-12 PROCEDURE — 82805 BLOOD GASES W/O2 SATURATION: CPT

## 2020-08-12 RX ORDER — TRAMADOL HYDROCHLORIDE 50 MG/1
50 TABLET ORAL EVERY 6 HOURS PRN
Status: DISCONTINUED | OUTPATIENT
Start: 2020-08-12 | End: 2020-08-13 | Stop reason: HOSPADM

## 2020-08-12 RX ORDER — ACETAMINOPHEN 325 MG/1
650 TABLET ORAL EVERY 6 HOURS PRN
Status: DISCONTINUED | OUTPATIENT
Start: 2020-08-12 | End: 2020-08-13 | Stop reason: HOSPADM

## 2020-08-12 RX ORDER — SODIUM CHLORIDE 9 MG/ML
INJECTION, SOLUTION INTRAVENOUS CONTINUOUS
Status: DISCONTINUED | OUTPATIENT
Start: 2020-08-12 | End: 2020-08-13 | Stop reason: HOSPADM

## 2020-08-12 RX ORDER — SODIUM CHLORIDE 0.9 % (FLUSH) 0.9 %
10 SYRINGE (ML) INJECTION PRN
Status: DISCONTINUED | OUTPATIENT
Start: 2020-08-12 | End: 2020-08-13 | Stop reason: HOSPADM

## 2020-08-12 RX ORDER — SODIUM CHLORIDE 0.9 % (FLUSH) 0.9 %
10 SYRINGE (ML) INJECTION EVERY 12 HOURS SCHEDULED
Status: DISCONTINUED | OUTPATIENT
Start: 2020-08-12 | End: 2020-08-13 | Stop reason: HOSPADM

## 2020-08-12 RX ORDER — ACETAMINOPHEN 650 MG/1
650 SUPPOSITORY RECTAL EVERY 6 HOURS PRN
Status: DISCONTINUED | OUTPATIENT
Start: 2020-08-12 | End: 2020-08-13 | Stop reason: HOSPADM

## 2020-08-12 RX ORDER — ONDANSETRON 2 MG/ML
4 INJECTION INTRAMUSCULAR; INTRAVENOUS EVERY 6 HOURS PRN
Status: DISCONTINUED | OUTPATIENT
Start: 2020-08-12 | End: 2020-08-13 | Stop reason: HOSPADM

## 2020-08-12 RX ADMIN — SODIUM CHLORIDE: 9 INJECTION, SOLUTION INTRAVENOUS at 22:01

## 2020-08-12 RX ADMIN — SODIUM CHLORIDE: 9 INJECTION, SOLUTION INTRAVENOUS at 09:34

## 2020-08-12 RX ADMIN — CEFTRIAXONE SODIUM 1 G: 1 INJECTION, POWDER, FOR SOLUTION INTRAMUSCULAR; INTRAVENOUS at 23:42

## 2020-08-12 RX ADMIN — ENOXAPARIN SODIUM 40 MG: 40 INJECTION SUBCUTANEOUS at 09:33

## 2020-08-12 RX ADMIN — SODIUM CHLORIDE 1000 ML: 9 INJECTION, SOLUTION INTRAVENOUS at 00:46

## 2020-08-12 RX ADMIN — SODIUM CHLORIDE, PRESERVATIVE FREE 10 ML: 5 INJECTION INTRAVENOUS at 09:34

## 2020-08-12 RX ADMIN — ONDANSETRON 4 MG: 2 INJECTION INTRAMUSCULAR; INTRAVENOUS at 09:35

## 2020-08-12 ASSESSMENT — PAIN SCALES - GENERAL
PAINLEVEL_OUTOF10: 0
PAINLEVEL_OUTOF10: 0
PAINLEVEL_OUTOF10: 7
PAINLEVEL_OUTOF10: 0

## 2020-08-12 ASSESSMENT — PAIN DESCRIPTION - PAIN TYPE: TYPE: ACUTE PAIN

## 2020-08-12 ASSESSMENT — PAIN DESCRIPTION - LOCATION: LOCATION: HEAD;EAR

## 2020-08-12 NOTE — PROGRESS NOTES
Patient IV infiltrated. 2 RNs attempted an IV insertion with no success. ER was contacted to come attempt an IV insertion at the earliest. Will continue to monitor and assess patient.

## 2020-08-12 NOTE — H&P
Conjunctivae/corneas clear. Neck: Supple, with full range of motion. No jugular venous distention. Trachea midline. Respiratory:  Normal respiratory effort. Clear to auscultation, bilaterally without Rales/Wheezes/Rhonchi. Cardiovascular:  Regular rate and rhythm with normal S1/S2 without murmurs, rubs or gallops. Abdomen: Soft, non-tender, non-distended with normal bowel sounds. Musculoskeletal:  No clubbing, cyanosis or edema bilaterally. Full range of motion without deformity. Pos LLOYDS   Skin: Skin color, texture, turgor normal.  No rashes or lesions. Neurologic:  Neurovascularly intact without any focal sensory/motor deficits. Cranial nerves: II-XII intact, grossly non-focal.  Psychiatric:  Alert and oriented, thought content appropriate, normal insight  Capillary Refill: Brisk,< 3 seconds   Peripheral Pulses: +2 palpable, equal bilaterally       Labs:     Recent Labs     08/11/20 2248 08/12/20  0514   WBC 7.1 7.8   HGB 14.5 13.1   HCT 43.5 39.1    212     Recent Labs     08/12/20  0143 08/12/20  0514    138   K 3.9 3.5    100   CO2 24 24   BUN 6 5*   CREATININE 0.6 0.6   CALCIUM 9.0 9.1     Recent Labs     08/12/20  0143   AST 14   ALT 8   BILITOT 0.3   ALKPHOS 63     Recent Labs     08/11/20  2248   INR 1.2     Recent Labs     08/11/20  2248   TROPONINI <0.01       Urinalysis:      Lab Results   Component Value Date    NITRU POSITIVE 08/11/2020    WBCUA >20 08/11/2020    WBCUA 5-10 05/09/2012    BACTERIA MANY 08/11/2020    RBCUA >20 08/11/2020    RBCUA NONE 05/09/2012    BLOODU TRACE-INTACT 08/11/2020    SPECGRAV 1.015 08/11/2020    GLUCOSEU Negative 08/11/2020    GLUCOSEU NEGATIVE 05/09/2012       Radiology:     CXR: I have reviewed the CXR with the following interpretation:     CT ABDOMEN PELVIS WO CONTRAST Additional Contrast? None   Final Result      1. No acute process in abdomen or pelvis.       2. Foreign body or debris is seen within the vaginal canal. Clinical   correlation recommended. XR CHEST PORTABLE   Final Result   Normal chest radiograph. ASSESSMENT:    Active Hospital Problems    Diagnosis Date Noted    UTI (urinary tract infection) [N39.0] 08/12/2020     Retained tampon        PLAN:  Rocephin  GYN for removal of tampon       DVT Prophylaxis:  lovenox  Diet: DIET GENERAL;  Code Status: Full Code    PT/OT Eval Status:  na    Dispo - *home, SS  Due to  Homelessness     Electronically signed by Brissa Blackman DO on 8/12/2020 at 4:19 PM [place]       Thank you No primary care provider on file. for the opportunity to be involved in this patient's care. If you have any questions or concerns please feel free to contact me at [phone].

## 2020-08-12 NOTE — ED PROVIDER NOTES
HPI:  8/11/20,   Time: 10:59 PM EDT       Jaden Pennington is a 22 y.o. female presenting to the ED for cough/ear ache/dizzy/light headed, beginning 2 weeks ago. The complaint has been persistent, moderate in severity, and worsened by exertion/movement. Seen at [facility] for same today, told hypoxic and hypotensive and to come to ed. No n/v/d. States cough is dry  vic ear pain. Worse with exertion. No hx same. Nothing makes better    Review of Systems:   Pertinent positives and negatives are stated within HPI, all other systems reviewed and are negative.          --------------------------------------------- PAST HISTORY ---------------------------------------------  Past Medical History:  has a past medical history of ADHD, Autism, Bipolar 1 disorder ([facility] [address] and PTSD (post-traumatic stress disorder). Past Surgical History:  has no past surgical history on file. Social History:  reports that she has been smoking cigarettes. She has been smoking about 0.50 packs per day. She has never used smokeless tobacco. She reports current drug use. Drug: Marijuana. She reports that she does not drink alcohol. Family History: family history is not on file. The patients home medications have been reviewed. Allergies: Shellfish-derived products        ---------------------------------------------------PHYSICAL EXAM--------------------------------------    Constitutional/General: Alert and oriented x3, well appearing, non toxic in NAD  Head: Normocephalic and atraumatic, vic tm effusions  Eyes: PERRL, EOMI, conjunctive normal, sclera non icteric  Mouth: Oropharynx clear, handling secretions, no trismus, no asymmetry of the posterior oropharynx or uvular edema  Neck: Supple, full ROM, non tender to palpation in the midline, no stridor, no crepitus, no meningeal signs  Respiratory: Lungs clear to auscultation bilaterally, no wheezes, rales, or rhonchi. Not in respiratory distress  Cardiovascular:  tachy rate.  Regular rhythm. No murmurs, gallops, or rubs. 2+ distal pulses  Chest: No chest wall tenderness  GI:  Abdomen Soft, Non tender, Non distended. +BS. No organomegaly, no palpable masses,  No rebound, guarding, or rigidity. Right cvat  Musculoskeletal: Moves all extremities x 4. Warm and well perfused, no clubbing, cyanosis, or edema. Capillary refill <3 seconds  Integument: skin warm and dry. No rashes. Lymphatic: no lymphadenopathy noted  Neurologic: GCS 15, no focal deficits, symmetric strength 5/5 in the upper and lower extremities bilaterally  Psychiatric: Normal Affect    -------------------------------------------------- RESULTS -------------------------------------------------  I have personally reviewed all laboratory and imaging results for this patient. Results are listed below.      LABS:  Results for orders placed or performed during the hospital encounter of 08/11/20   Strep Screen Group A Throat    Specimen: Throat   Result Value Ref Range    Strep Grp A PCR Negative Negative   CBC Auto Differential   Result Value Ref Range    WBC 7.1 4.5 - 11.5 E9/L    RBC 5.14 3.50 - 5.50 E12/L    Hemoglobin 14.5 11.5 - 15.5 g/dL    Hematocrit 43.5 34.0 - 48.0 %    MCV 84.6 80.0 - 99.9 fL    MCH 28.2 26.0 - 35.0 pg    MCHC 33.3 32.0 - 34.5 %    RDW 12.7 11.5 - 15.0 fL    Platelets 647 499 - 518 E9/L    MPV 10.1 7.0 - 12.0 fL    Neutrophils % 63.9 43.0 - 80.0 %    Immature Granulocytes % 0.4 0.0 - 5.0 %    Lymphocytes % 30.2 20.0 - 42.0 %    Monocytes % 4.7 2.0 - 12.0 %    Eosinophils % 0.4 0.0 - 6.0 %    Basophils % 0.4 0.0 - 2.0 %    Neutrophils Absolute 4.52 1.80 - 7.30 E9/L    Immature Granulocytes # 0.03 E9/L    Lymphocytes Absolute 2.14 1.50 - 4.00 E9/L    Monocytes Absolute 0.33 0.10 - 0.95 E9/L    Eosinophils Absolute 0.03 (L) 0.05 - 0.50 E9/L    Basophils Absolute 0.03 0.00 - 0.20 E9/L   Troponin   Result Value Ref Range    Troponin <0.01 0.00 - 0.03 ng/mL   Urinalysis   Result Value Ref Range    Color, UA Yellow Straw/Yellow    Clarity, UA SL CLOUDY Clear    Glucose, Ur Negative Negative mg/dL    Bilirubin Urine Negative Negative    Ketones, Urine Negative Negative mg/dL    Specific Gravity, UA 1.015 1.005 - 1.030    Blood, Urine TRACE-INTACT Negative    pH, UA 6.5 5.0 - 9.0    Protein, UA Negative Negative mg/dL    Urobilinogen, Urine 1.0 <2.0 E.U./dL    Nitrite, Urine POSITIVE (A) Negative    Leukocyte Esterase, Urine LARGE (A) Negative   COVID-19   Result Value Ref Range    SARS-CoV-2, NAAT Not Detected Not Detected   Lactate, Sepsis   Result Value Ref Range    Lactic Acid, Sepsis 1.4 0.5 - 1.9 mmol/L   Protime-INR   Result Value Ref Range    Protime 13.7 (H) 9.3 - 12.4 sec    INR 1.2    APTT   Result Value Ref Range    aPTT 28.7 24.5 - 35.1 sec   Microscopic Urinalysis   Result Value Ref Range    WBC, UA >20 (A) 0 - 5 /HPF    RBC, UA >20 0 - 2 /HPF    Epithelial Cells, UA RARE /HPF    Bacteria, UA MANY (A) None Seen /HPF   D-Dimer, Quantitative   Result Value Ref Range    D-Dimer, Quant 221 ng/mL DDU   Blood Gas, Arterial   Result Value Ref Range    Date Analyzed [id number]     Time Analyzed 0001     Source: Blood Arterial     pH, Blood Gas 7.448 7.350 - 7.450    PCO2 38.3 35.0 - 45.0 mmHg    PO2 79.0 75.0 - 100.0 mmHg    HCO3 25.9 22.0 - 26.0 mmol/L    B.E. 1.9 -3.0 - 3.0 mmol/L    O2 Sat 95.5 92.0 - 98.5 %    O2Hb 93.6 (L) 94.0 - 97.0 %    COHb 1.7 (H) 0.0 - 1.5 %    MetHb 0.3 0.0 - 1.5 %    O2 Content 17.3 mL/dL    HHb 4.4 0.0 - 5.0 %    tHb (est) 13.1 11.5 - 16.5 g/dL    Mode RA     Date Of Collection      Time Collected      Pt Temp 37.0 C     ID [id number]     Lab [id number]     Critical(s) Notified .  No Critical Values    POC Pregnancy Urine Qual   Result Value Ref Range    HCG, Urine, POC Negative Negative    Lot Number [id number]     Positive QC Pass/Fail Pass     Negative QC Pass/Fail Pass    EKG 12 Lead   Result Value Ref Range    Ventricular Rate 97 BPM    Atrial Rate 97 BPM    P-R Interval 126 ms    QRS Duration 18   Temp: 98.4 °F (36.9 °C) 98.6 °F (37 °C)    TempSrc: Temporal Oral    SpO2: 97% 97% 99%   Weight:  141 lb (64 kg)    Height:  5' 3\" (1.6 m)      Card/Pulm:  Rhythm: normal rate. Heart Sounds: no murmurs, gallops, or rubs. clear to auscultation, no wheezes or rales and unlabored breathing. Capillary Refill: normal.  Radial Pulse:  equal.  Skin:  Warm. Consultations:             sound    Critical Care: 35 min        Counseling: The emergency provider has spoken with the patient and discussed todays results, in addition to providing specific details for the plan of care and counseling regarding the diagnosis and prognosis. Questions are answered at this time and they are agreeable with the plan.       --------------------------------- IMPRESSION AND DISPOSITION ---------------------------------    IMPRESSION  1. Pyelonephritis    2. Septicemia ([facility] [address]    3. Hypotension, unspecified hypotension type        DISPOSITION  Disposition: Admit to med/surg floor  Patient condition is stable    NOTE: This report was transcribed using voice recognition software.  Every effort was made to ensure accuracy; however, inadvertent computerized transcription errors may be present        Uzma Sims MD  08/12/20 0011

## 2020-08-12 NOTE — ED NOTES
Julia Hatch notified patient in for transport.  [facility]AR was faxed     Michelle Freeman RN  08/12/20 7105

## 2020-08-12 NOTE — ED NOTES
FIRST PROVIDER CONTACT ASSESSMENT NOTE      Department of Emergency Medicine   ED  First Provider Note   8/11/20  10:25 PM EDT    Chief Complaint: Otalgia (x2 weeks and pain has been worsening. Also states she has a dry cough, sore throat and feels dizzy at times. Seen at urgent care today for same complaint and was advised to come to ED immediately after due hypotension and SOB. )      History of Present Illness:    Reese Mccray is a 22 y.o. female who presents to the ED by private car for dizziness, bilateral ear pain sore throat cough sob abnormal labs   Focused Screening Exam:  Constitutional:  Alert, appears stated age and is in no distress.   Heart regular rate and rhythm  Lungs clear    *ALLERGIES*     Shellfish-derived products     ED Triage Vitals [08/11/20 2220]   BP Temp Temp Source Pulse Resp SpO2 Height Weight   -- 98.4 °F (36.9 °C) Temporal 134 -- 97 % -- --        Initial Plan of Care:  Initiate Treatment-Testing, Proceed toTreatment Area When Bed Available for ED Attending/MLP to Continue Care    -----------------END OF FIRST PROVIDER CONTACT ASSESSMENT NOTE--------------  Electronically signed by PETER Barnes   DD: 8/11/20     PETER Barnes  08/11/20 6047

## 2020-08-12 NOTE — CARE COORDINATION
8/12/2020 Care Coordination - spoke with pt in room to discuss discharge planning. She reports she has no PCP. Pharmacy is [facility] [address] in [place]. She states she is homeless but currently staying with a friend at their house in [place]. Denies hx MARTY/ARU, DME, or HHC. States she was in [facility] drug rehab for 23 days in June. She does not work or drive. She reports [employer] called her this am and will be following up with her to sign up for medicaid. Plan is to return to her friend's house when medically stable. She states her grandmother will provide transportation at discharge. SW/CM will follow.

## 2020-08-13 VITALS
HEIGHT: 63 IN | OXYGEN SATURATION: 98 % | TEMPERATURE: 97.2 F | HEART RATE: 55 BPM | RESPIRATION RATE: 18 BRPM | DIASTOLIC BLOOD PRESSURE: 36 MMHG | WEIGHT: 151.06 LBS | BODY MASS INDEX: 26.77 KG/M2 | SYSTOLIC BLOOD PRESSURE: 80 MMHG

## 2020-08-13 LAB
ACINETOBACTER BAUMANNII BY PCR: NOT DETECTED
ANION GAP SERPL CALCULATED.3IONS-SCNC: 14 MMOL/L (ref 7–16)
BASOPHILS ABSOLUTE: 0.09 E9/L (ref 0–0.2)
BASOPHILS RELATIVE PERCENT: 1.7 % (ref 0–2)
BOTTLE TYPE: ABNORMAL
BUN BLDV-MCNC: 6 MG/DL (ref 6–20)
CALCIUM SERPL-MCNC: 8.2 MG/DL (ref 8.6–10.2)
CANDIDA ALBICANS BY PCR: NOT DETECTED
CANDIDA GLABRATA BY PCR: NOT DETECTED
CANDIDA KRUSEI BY PCR: NOT DETECTED
CANDIDA PARAPSILOSIS BY PCR: NOT DETECTED
CANDIDA TROPICALIS BY PCR: NOT DETECTED
CHLORIDE BLD-SCNC: 107 MMOL/L (ref 98–107)
CO2: 21 MMOL/L (ref 22–29)
CREAT SERPL-MCNC: 0.5 MG/DL (ref 0.5–1)
ENTEROBACTER CLOACAE COMPLEX BY PCR: NOT DETECTED
ENTEROBACTERALES BY PCR: NOT DETECTED
ENTEROCOCCUS BY PCR: NOT DETECTED
EOSINOPHILS ABSOLUTE: 0.09 E9/L (ref 0.05–0.5)
EOSINOPHILS RELATIVE PERCENT: 1.7 % (ref 0–6)
ESCHERICHIA COLI BY PCR: NOT DETECTED
GFR AFRICAN AMERICAN: >60
GFR NON-AFRICAN AMERICAN: >60 ML/MIN/1.73
GLUCOSE BLD-MCNC: 77 MG/DL (ref 74–99)
HAEMOPHILUS INFLUENZAE BY PCR: NOT DETECTED
HCT VFR BLD CALC: 32.2 % (ref 34–48)
HEMOGLOBIN: 10.7 G/DL (ref 11.5–15.5)
KLEBSIELLA OXYTOCA BY PCR: NOT DETECTED
KLEBSIELLA PNEUMONIAE GROUP BY PCR: NOT DETECTED
LISTERIA MONOCYTOGENES BY PCR: NOT DETECTED
LYMPHOCYTES ABSOLUTE: 1.85 E9/L (ref 1.5–4)
LYMPHOCYTES RELATIVE PERCENT: 36.5 % (ref 20–42)
MCH RBC QN AUTO: 28.5 PG (ref 26–35)
MCHC RBC AUTO-ENTMCNC: 33.2 % (ref 32–34.5)
MCV RBC AUTO: 85.9 FL (ref 80–99.9)
METHICILLIN RESISTANCE MECA/C  BY PCR: NOT DETECTED
MONOCYTES ABSOLUTE: 0.4 E9/L (ref 0.1–0.95)
MONOCYTES RELATIVE PERCENT: 7.8 % (ref 2–12)
MYELOCYTE PERCENT: 0.9 % (ref 0–0)
NEISSERIA MENINGITIDIS BY PCR: NOT DETECTED
NEUTROPHILS ABSOLUTE: 2.6 E9/L (ref 1.8–7.3)
NEUTROPHILS RELATIVE PERCENT: 51.3 % (ref 43–80)
ORDER NUMBER: ABNORMAL
PDW BLD-RTO: 12.8 FL (ref 11.5–15)
PLATELET # BLD: 170 E9/L (ref 130–450)
PMV BLD AUTO: 10.9 FL (ref 7–12)
POLYCHROMASIA: ABNORMAL
POTASSIUM SERPL-SCNC: 3.5 MMOL/L (ref 3.5–5)
PROTEUS BY PCR: NOT DETECTED
PSEUDOMONAS AERUGINOSA BY PCR: NOT DETECTED
RBC # BLD: 3.75 E12/L (ref 3.5–5.5)
SERRATIA MARCESCENS BY PCR: NOT DETECTED
SODIUM BLD-SCNC: 142 MMOL/L (ref 132–146)
SOURCE OF BLOOD CULTURE: ABNORMAL
STAPHYLOCOCCUS AUREUS BY PCR: NOT DETECTED
STAPHYLOCOCCUS SPECIES BY PCR: DETECTED
STREPTOCOCCUS AGALACTIAE BY PCR: NOT DETECTED
STREPTOCOCCUS PNEUMONIAE BY PCR: NOT DETECTED
STREPTOCOCCUS PYOGENES  BY PCR: NOT DETECTED
STREPTOCOCCUS SPECIES BY PCR: NOT DETECTED
WBC # BLD: 5 E9/L (ref 4.5–11.5)

## 2020-08-13 PROCEDURE — 80048 BASIC METABOLIC PNL TOTAL CA: CPT

## 2020-08-13 PROCEDURE — 36415 COLL VENOUS BLD VENIPUNCTURE: CPT

## 2020-08-13 PROCEDURE — 2580000003 HC RX 258: Performed by: FAMILY MEDICINE

## 2020-08-13 PROCEDURE — 85025 COMPLETE CBC W/AUTO DIFF WBC: CPT

## 2020-08-13 RX ADMIN — SODIUM CHLORIDE: 9 INJECTION, SOLUTION INTRAVENOUS at 04:33

## 2020-08-13 RX ADMIN — SODIUM CHLORIDE: 9 INJECTION, SOLUTION INTRAVENOUS at 10:30

## 2020-08-13 ASSESSMENT — PAIN SCALES - GENERAL
PAINLEVEL_OUTOF10: 0
PAINLEVEL_OUTOF10: 0
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
## 2020-08-13 NOTE — PROGRESS NOTES
Hospitalist Progress Note      PCP: No primary care provider on file. Date of Admission: 8/11/2020    Chief Complaint: *abd pain        History Of Present Illness:      22 y.o. female who presented to [facility] with * onset several days ago, . She states her  LMP was July 17, and she was unable to remove her tampon. She has noticed a foul odor from her vagina, bilateral flank pain, , no dysuria or hematuria, or discharge. She has had fever and chills. ** today she woke up and began to swear at the staff and demanding to leave. I explained to her that she has a serious infection that can lead to sepsis and death, she said\" it is only a UTI\". I told her that the retained tampon  Will be removed today, the doctor is on his way, she said \" she can go up there and get it herself\". She left AMA    Subjective:  States she woke up mad and wants to get out of here. Medications:  Reviewed    Infusion Medications   Scheduled Medications   PRN Meds:       Intake/Output Summary (Last 24 hours) at 8/13/2020 1509  Last data filed at 8/13/2020 0644  Gross per 24 hour   Intake 2198 ml   Output --   Net 2198 ml       Exam:    BP (!) 80/36   Pulse 55   Temp 97.2 °F (36.2 °C) (Temporal)   Resp 18   Ht 5' 3\" (1.6 m)   Wt 151 lb 1 oz (68.5 kg)   LMP 07/17/2020   SpO2 98%   BMI 26.76 kg/m²     General appearance:  No apparent distress, appears stated age and cooperative. HEENT:  Normal cephalic, atraumatic without obvious deformity. Pupils equal, round, and reactive to light. Extra ocular muscles intact. Conjunctivae/corneas clear. Neck: Supple, with full range of motion. No jugular venous distention. Trachea midline. Respiratory:  Normal respiratory effort. Clear to auscultation, bilaterally without Rales/Wheezes/Rhonchi. Cardiovascular:  Regular rate and rhythm with normal S1/S2 without murmurs, rubs or gallops. Abdomen: Soft, non-tender, non-distended with normal bowel sounds.   Musculoskeletal:  No clubbing, cyanosis or edema bilaterally. Full range of motion without deformity. Pos LLOYDS   Skin: Skin color, texture, turgor normal.  No rashes or lesions. Neurologic:  Neurovascularly intact without any focal sensory/motor deficits. Cranial nerves: II-XII intact, grossly non-focal.  Psychiatric:  Alert and oriented, thought content appropriate, normal insight  Capillary Refill: Brisk,< 3 seconds   Peripheral Pulses: +2 palpable, equal bilaterally                       Labs:   Recent Labs     08/11/20  2248 08/12/20  0514 08/13/20  0509   WBC 7.1 7.8 5.0   HGB 14.5 13.1 10.7*   HCT 43.5 39.1 32.2*    212 170     Recent Labs     08/12/20  0143 08/12/20  0514 08/13/20  0509    138 142   K 3.9 3.5 3.5    100 107   CO2 24 24 21*   BUN 6 5* 6   CREATININE 0.6 0.6 0.5   CALCIUM 9.0 9.1 8.2*     Recent Labs     08/12/20  0143   AST 14   ALT 8   BILITOT 0.3   ALKPHOS 63     Recent Labs     08/11/20  2248   INR 1.2     Recent Labs     08/11/20  2248   TROPONINI <0.01     Recent Labs     08/12/20  0143   AST 14   ALT 8   BILITOT 0.3   ALKPHOS 63     No results for input(s): LACTA in the last 72 hours.   No results found for: Lesli Tristan  No results found for: AMMONIA    Assessment:    Active Hospital Problems    Diagnosis Date Noted    UTI (urinary tract infection) [N39.0] 08/12/2020   Retained tampon    Plan:  Left AMA  Electronically signed by Linda Melara DO on 8/13/2020 at 3:09 PM [place]

## 2020-08-14 LAB
ORGANISM: ABNORMAL
URINE CULTURE, ROUTINE: ABNORMAL

## 2020-08-17 LAB
BLOOD CULTURE, ROUTINE: ABNORMAL
CULTURE, BLOOD 2: NORMAL
ORGANISM: ABNORMAL

## 2020-08-22 ENCOUNTER — HOSPITAL ENCOUNTER (EMERGENCY)
Age: 20
Discharge: HOME | End: 2020-08-22
Attending: EMERGENCY MEDICINE
Payer: COMMERCIAL

## 2020-08-22 VITALS
TEMPERATURE: 98 F | DIASTOLIC BLOOD PRESSURE: 89 MMHG | BODY MASS INDEX: 25.69 KG/M2 | HEART RATE: 124 BPM | WEIGHT: 145 LBS | OXYGEN SATURATION: 97 % | SYSTOLIC BLOOD PRESSURE: 133 MMHG | RESPIRATION RATE: 16 BRPM

## 2020-08-22 PROCEDURE — 99284 EMERGENCY DEPT VISIT MOD MDM: CPT

## 2020-08-22 ASSESSMENT — ENCOUNTER SYMPTOMS
CONSTIPATION: 0
COLOR CHANGE: 0
DIARRHEA: 0
NAUSEA: 0
ABDOMINAL DISTENTION: 0
PHOTOPHOBIA: 0
CHEST TIGHTNESS: 0
FACIAL SWELLING: 0
EYE REDNESS: 0
RHINORRHEA: 0
SHORTNESS OF BREATH: 0
COUGH: 0
EYE PAIN: 0

## 2020-08-22 NOTE — ED NOTES
Patient calm and cooperative at present, still attempting to find a ride home.       Lisa Marquez RN  08/22/20 4670

## 2020-08-22 NOTE — ED NOTES
Patient requests to leave hospital AMA at this time. Patient verbalizes understanding of serious risks involved with leaving against a physician's advice to stay. These risks that patient verbalized understanding of include death and/or disability. Patient understands this and requests to leave despite these risks. Patient appears to be of sound mind and answers all orientation questions appropriately, patient instructed as to need to follow up on this medical issue. AMA form signed by patient with this RN as a witness. Physician in ER also went over risks of leaving hospital against medical advice.        Deana Aguilera RN  08/22/20 8695

## 2020-08-22 NOTE — ED PROVIDER NOTES
Constitutional:       General: She is not in acute distress. Appearance: She is well-developed and normal weight. She is not ill-appearing. HENT:      Head: Normocephalic and atraumatic. Right Ear: Tympanic membrane normal.      Left Ear: Tympanic membrane normal.      Nose: Nose normal. No congestion. Mouth/Throat:      Mouth: Mucous membranes are moist.      Pharynx: Oropharynx is clear. Eyes:      Pupils: Pupils are equal, round, and reactive to light. Neck:      Musculoskeletal: Normal range of motion and neck supple. Cardiovascular:      Rate and Rhythm: Regular rhythm. Tachycardia present. Pulmonary:      Effort: Pulmonary effort is normal. No respiratory distress. Breath sounds: Normal breath sounds. No wheezing or rales. Abdominal:      General: Bowel sounds are normal.      Palpations: Abdomen is soft. Tenderness: There is no abdominal tenderness. There is no guarding or rebound. Skin:     General: Skin is warm and dry. Neurological:      Mental Status: She is alert and oriented to person, place, and time. Cranial Nerves: No cranial nerve deficit. Coordination: Coordination normal.   Psychiatric:         Mood and Affect: Mood is elated. Behavior: Behavior normal.         Thought Content: Thought content does not include homicidal or suicidal ideation. Thought content does not include homicidal or suicidal plan. Procedures         MDM  Number of Diagnoses or Management Options  Drug use disorder:   Diagnosis management comments: Patient presented to the emergency department due to calling the ambulance stating that she did acid last night and wanted to come to the hospital.  Upon arrival to hospital she did not want her work-up she refused to have her vitals checked or any blood work or laboratory examinations completed. Patient did finally allow vitals to be checked and they were stable.   When talking to the patient she had no suicidal homicidal ideation. She did not want an examination also states that she want to leave. Patient observed in the emergency department for 2 and half hours and after which the patient stated she would want to leave [address] The patient was alert and oriented and was assessed as capable of making her own decision and so she was allowed to leave [address] ED Course as of Aug 22 1525   Sat Aug 22, 2020   4254 ATTENDING PROVIDER ATTESTATION:     Bibi Prescott presented to the emergency department for evaluation of [unfilled]  I have reviewed and discussed the case, including pertinent history (medical, surgical, family and social) and exam findings with the Midlevel and the Nurse assigned to Bibi Kenyon. I have personally performed and/or participated in the history, exam, medical decision making, and procedures and agree with all pertinent clinical information. I have reviewed my findings and recommendations with Bbii Prescott and members of family present at the time of disposition. My findings/plan: Patient is a 20-year-old female who presents the chief complaint of drug use. Patient states that she did acid last evening, and is \"tripping off of it. \"  Patient states that she is here so her mother could come pick her up. She states that she just needs to be picked up so that she can go home where she is safe. She denies any other drug or alcohol use. Patient denies any suicidal homicidal ideations. She is actively refusing any blood work at this time. She is alert and oriented x3. On examination she is yelling on her phone and her boyfriend, pick her up. Clear breath sounds in all lung fields. Tachycardia, regular rhythm. No abdominal tenderness palpation. No lower extreme edema present. Patient is alert and oriented x3. No suicidal homicidal ideations.   I do not feel the patient is safe to be discharged at this time unless she has someone to come and pick her up. The patient keeps yelling that she wants a taxi voucher, which we do not give out anymore. Johny Mesa DO      [MS]      ED Course User Index  [MS] Viridiana Bains DO        ED Course as of Aug 22 1528   Sat Aug 22, 2020   0711 ATTENDING PROVIDER ATTESTATION:     Gavin Perry presented to the emergency department for evaluation of [unfilled]  I have reviewed and discussed the case, including pertinent history (medical, surgical, family and social) and exam findings with the Midlevel and the Nurse assigned to Gavin Perry. I have personally performed and/or participated in the history, exam, medical decision making, and procedures and agree with all pertinent clinical information. I have reviewed my findings and recommendations with Gavin Perry and members of family present at the time of disposition. My findings/plan: Patient is a 78-year-old female who presents the chief complaint of drug use. Patient states that she did acid last evening, and is \"tripping off of it. \"  Patient states that she is here so her mother could come pick her up. She states that she just needs to be picked up so that she can go home where she is safe. She denies any other drug or alcohol use. Patient denies any suicidal homicidal ideations. She is actively refusing any blood work at this time. She is alert and oriented x3. On examination she is yelling on her phone and her boyfriend, pick her up. Clear breath sounds in all lung fields. Tachycardia, regular rhythm. No abdominal tenderness palpation. No lower extreme edema present. Patient is alert and oriented x3. No suicidal homicidal ideations. I do not feel the patient is safe to be discharged at this time unless she has someone to come and pick her up. The patient keeps yelling that she wants a taxi voucher, which we do not give out anymore.   Johny Mesa DO      [MS]      ED Course User Index  [MS] Billy Nogueira DO       --------------------------------------------- PAST HISTORY ---------------------------------------------  Past Medical History:  has a past medical history of ADHD, Autism, Bipolar 1 disorder ([facility] [address] and PTSD (post-traumatic stress disorder). Past Surgical History:  has no past surgical history on file. Social History:  reports that she has been smoking cigarettes. She has been smoking about 0.50 packs per day. She has never used smokeless tobacco. She reports current drug use. Drug: Marijuana. She reports that she does not drink alcohol. Family History: family history is not on file. The patients home medications have been reviewed. Allergies: Shellfish-derived products    -------------------------------------------------- RESULTS -------------------------------------------------  Labs:  No results found for this visit on 08/22/20. Radiology:  No orders to display       ------------------------- NURSING NOTES AND VITALS REVIEWED ---------------------------  Date / Time Roomed:  8/22/2020  6:03 AM  ED Bed Assignment:  09/09    The nursing notes within the ED encounter and vital signs as below have been reviewed. /89   Pulse 124   Temp 98 °F (36.7 °C) (Oral)   Resp 16   Wt 145 lb (65.8 kg)   SpO2 97%   BMI 25.69 kg/m²   Oxygen Saturation Interpretation: Normal      ------------------------------------------ PROGRESS NOTES ------------------------------------------  3:28 PM EDT  I have spoken with the patient and discussed todays results, in addition to providing specific details for the plan of care and counseling regarding the diagnosis and prognosis. Their questions are answered at this time and they are agreeable with the plan. I discussed at length with them reasons for immediate return here for re evaluation. They will followup with PCP by calling their office tomorrow      Discharge Medication List as of 8/22/2020  8:40 AM          Diagnosis:  1.  Drug

## 2020-09-11 PROBLEM — N39.0 UTI (URINARY TRACT INFECTION): Status: RESOLVED | Noted: 2020-08-12 | Resolved: 2020-09-11

## 2021-06-09 ENCOUNTER — HOSPITAL ENCOUNTER (EMERGENCY)
Age: 21
Discharge: ANOTHER HOSPITAL | DRG: 566 | End: 2021-06-09
Attending: EMERGENCY MEDICINE
Payer: COMMERCIAL

## 2021-06-09 VITALS
RESPIRATION RATE: 16 BRPM | HEIGHT: 63 IN | HEART RATE: 88 BPM | WEIGHT: 159.6 LBS | OXYGEN SATURATION: 100 % | TEMPERATURE: 97.1 F | DIASTOLIC BLOOD PRESSURE: 84 MMHG | BODY MASS INDEX: 28.28 KG/M2 | SYSTOLIC BLOOD PRESSURE: 120 MMHG

## 2021-06-09 DIAGNOSIS — O26.90 COMPLICATION OF PREGNANCY, ANTEPARTUM: ICD-10-CM

## 2021-06-09 DIAGNOSIS — R10.30 LOWER ABDOMINAL PAIN: Primary | ICD-10-CM

## 2021-06-09 DIAGNOSIS — S39.91XA BLUNT ABDOMINAL TRAUMA, INITIAL ENCOUNTER: ICD-10-CM

## 2021-06-09 LAB
ANION GAP SERPL CALCULATED.3IONS-SCNC: 11 MMOL/L (ref 7–16)
BASOPHILS ABSOLUTE: 0.04 E9/L (ref 0–0.2)
BASOPHILS RELATIVE PERCENT: 0.3 % (ref 0–2)
BUN BLDV-MCNC: 6 MG/DL (ref 6–20)
CALCIUM SERPL-MCNC: 9.3 MG/DL (ref 8.6–10.2)
CHLORIDE BLD-SCNC: 99 MMOL/L (ref 98–107)
CO2: 25 MMOL/L (ref 22–29)
CREAT SERPL-MCNC: 0.4 MG/DL (ref 0.5–1)
EOSINOPHILS ABSOLUTE: 0.53 E9/L (ref 0.05–0.5)
EOSINOPHILS RELATIVE PERCENT: 3.8 % (ref 0–6)
GFR AFRICAN AMERICAN: >60
GFR NON-AFRICAN AMERICAN: >60 ML/MIN/1.73
GLUCOSE BLD-MCNC: 113 MG/DL (ref 74–99)
HCT VFR BLD CALC: 32.2 % (ref 34–48)
HEMOGLOBIN: 11.5 G/DL (ref 11.5–15.5)
IMMATURE GRANULOCYTES #: 0.11 E9/L
IMMATURE GRANULOCYTES %: 0.8 % (ref 0–5)
LYMPHOCYTES ABSOLUTE: 2.11 E9/L (ref 1.5–4)
LYMPHOCYTES RELATIVE PERCENT: 15.3 % (ref 20–42)
MCH RBC QN AUTO: 29.8 PG (ref 26–35)
MCHC RBC AUTO-ENTMCNC: 35.7 % (ref 32–34.5)
MCV RBC AUTO: 83.4 FL (ref 80–99.9)
MONOCYTES ABSOLUTE: 0.74 E9/L (ref 0.1–0.95)
MONOCYTES RELATIVE PERCENT: 5.4 % (ref 2–12)
NEUTROPHILS ABSOLUTE: 10.24 E9/L (ref 1.8–7.3)
NEUTROPHILS RELATIVE PERCENT: 74.4 % (ref 43–80)
PDW BLD-RTO: 13.7 FL (ref 11.5–15)
PLATELET # BLD: 254 E9/L (ref 130–450)
PMV BLD AUTO: 9.5 FL (ref 7–12)
POTASSIUM SERPL-SCNC: 3.3 MMOL/L (ref 3.5–5)
RBC # BLD: 3.86 E12/L (ref 3.5–5.5)
SODIUM BLD-SCNC: 135 MMOL/L (ref 132–146)
WBC # BLD: 13.8 E9/L (ref 4.5–11.5)

## 2021-06-09 PROCEDURE — 99285 EMERGENCY DEPT VISIT HI MDM: CPT

## 2021-06-09 PROCEDURE — 96372 THER/PROPH/DIAG INJ SC/IM: CPT

## 2021-06-09 PROCEDURE — 80048 BASIC METABOLIC PNL TOTAL CA: CPT

## 2021-06-09 PROCEDURE — 6370000000 HC RX 637 (ALT 250 FOR IP): Performed by: EMERGENCY MEDICINE

## 2021-06-09 PROCEDURE — 36415 COLL VENOUS BLD VENIPUNCTURE: CPT

## 2021-06-09 PROCEDURE — 85025 COMPLETE CBC W/AUTO DIFF WBC: CPT

## 2021-06-09 RX ORDER — ACETAMINOPHEN 500 MG
1000 TABLET ORAL ONCE
Status: COMPLETED | OUTPATIENT
Start: 2021-06-09 | End: 2021-06-09

## 2021-06-09 RX ORDER — ONDANSETRON 4 MG/1
4 TABLET, FILM COATED ORAL EVERY 8 HOURS PRN
Status: ON HOLD | COMMUNITY
End: 2021-06-16 | Stop reason: HOSPADM

## 2021-06-09 RX ADMIN — ACETAMINOPHEN 1000 MG: 500 TABLET ORAL at 22:37

## 2021-06-09 ASSESSMENT — PAIN - FUNCTIONAL ASSESSMENT: PAIN_FUNCTIONAL_ASSESSMENT: 0-10

## 2021-06-09 ASSESSMENT — PAIN DESCRIPTION - PROGRESSION
CLINICAL_PROGRESSION: GRADUALLY WORSENING
CLINICAL_PROGRESSION: GRADUALLY WORSENING

## 2021-06-09 ASSESSMENT — PAIN DESCRIPTION - PAIN TYPE
TYPE: ACUTE PAIN
TYPE: ACUTE PAIN

## 2021-06-09 ASSESSMENT — PAIN SCALES - GENERAL
PAINLEVEL_OUTOF10: 10

## 2021-06-09 ASSESSMENT — PAIN DESCRIPTION - FREQUENCY
FREQUENCY: CONTINUOUS
FREQUENCY: CONTINUOUS

## 2021-06-09 ASSESSMENT — PAIN DESCRIPTION - ONSET: ONSET: ON-GOING

## 2021-06-09 ASSESSMENT — PAIN DESCRIPTION - LOCATION: LOCATION: ABDOMEN

## 2021-06-09 ASSESSMENT — PAIN DESCRIPTION - DESCRIPTORS: DESCRIPTORS: ACHING

## 2021-06-09 ASSESSMENT — PAIN DESCRIPTION - ORIENTATION: ORIENTATION: LOWER

## 2021-06-10 ENCOUNTER — HOSPITAL ENCOUNTER (INPATIENT)
Age: 21
LOS: 6 days | Discharge: HOME | DRG: 566 | End: 2021-06-16
Attending: PSYCHIATRY & NEUROLOGY | Admitting: PSYCHIATRY & NEUROLOGY
Payer: COMMERCIAL

## 2021-06-10 ENCOUNTER — APPOINTMENT (OUTPATIENT)
Dept: ULTRASOUND IMAGING | Age: 21
DRG: 566 | End: 2021-06-10
Payer: COMMERCIAL

## 2021-06-10 ENCOUNTER — HOSPITAL ENCOUNTER (INPATIENT)
Age: 21
LOS: 1 days | Discharge: PSYCHIATRIC OR SUBSTANCE USE FACILITY | DRG: 566 | End: 2021-06-10
Attending: EMERGENCY MEDICINE
Payer: COMMERCIAL

## 2021-06-10 VITALS
DIASTOLIC BLOOD PRESSURE: 63 MMHG | SYSTOLIC BLOOD PRESSURE: 103 MMHG | HEART RATE: 103 BPM | HEIGHT: 63 IN | BODY MASS INDEX: 29.95 KG/M2 | RESPIRATION RATE: 14 BRPM | TEMPERATURE: 97.5 F | WEIGHT: 169 LBS | OXYGEN SATURATION: 94 %

## 2021-06-10 DIAGNOSIS — A60.00 GENITAL HERPES SIMPLEX, UNSPECIFIED SITE: ICD-10-CM

## 2021-06-10 DIAGNOSIS — R45.851 DEPRESSION WITH SUICIDAL IDEATION: ICD-10-CM

## 2021-06-10 DIAGNOSIS — O98.312 CONDYLOMA ACUMINATA OF VULVA DURING PREGNANCY IN SECOND TRIMESTER: ICD-10-CM

## 2021-06-10 DIAGNOSIS — F32.A DEPRESSION WITH SUICIDAL IDEATION: ICD-10-CM

## 2021-06-10 DIAGNOSIS — O98.112: ICD-10-CM

## 2021-06-10 DIAGNOSIS — R10.30 LOWER ABDOMINAL PAIN: Primary | ICD-10-CM

## 2021-06-10 DIAGNOSIS — O23.42 URINARY TRACT INFECTION IN MOTHER DURING SECOND TRIMESTER OF PREGNANCY: ICD-10-CM

## 2021-06-10 DIAGNOSIS — A63.0 CONDYLOMA ACUMINATA OF VULVA DURING PREGNANCY IN SECOND TRIMESTER: ICD-10-CM

## 2021-06-10 PROBLEM — O99.340 DEPRESSION AFFECTING PREGNANCY: Status: ACTIVE | Noted: 2021-06-10

## 2021-06-10 LAB
ABO/RH: NORMAL
ACETAMINOPHEN LEVEL: <5 MCG/ML (ref 10–30)
AMPHETAMINE SCREEN, URINE: NOT DETECTED
BACTERIA: ABNORMAL /HPF
BARBITURATE SCREEN URINE: NOT DETECTED
BENZODIAZEPINE SCREEN, URINE: NOT DETECTED
BILIRUBIN URINE: NEGATIVE
BLOOD, URINE: ABNORMAL
CANNABINOID SCREEN URINE: POSITIVE
CLARITY: CLEAR
CLUE CELLS: ABNORMAL
COCAINE METABOLITE SCREEN URINE: NOT DETECTED
COLOR: YELLOW
CRYSTALS, UA: ABNORMAL /HPF
EPITHELIAL CELLS, UA: ABNORMAL /HPF
ETHANOL: <10 MG/DL (ref 0–0.08)
FENTANYL SCREEN, URINE: NOT DETECTED
GLUCOSE URINE: NEGATIVE MG/DL
KETONES, URINE: NEGATIVE MG/DL
LEUKOCYTE ESTERASE, URINE: ABNORMAL
Lab: ABNORMAL
METHADONE SCREEN, URINE: NOT DETECTED
NITRITE, URINE: NEGATIVE
OPIATE SCREEN URINE: NOT DETECTED
OXYCODONE URINE: NOT DETECTED
PH UA: 7 (ref 5–9)
PHENCYCLIDINE SCREEN URINE: NOT DETECTED
PROTEIN UA: NEGATIVE MG/DL
RBC UA: ABNORMAL /HPF (ref 0–2)
SALICYLATE, SERUM: <0.3 MG/DL (ref 0–30)
SARS-COV-2, NAAT: NOT DETECTED
SOURCE WET PREP: ABNORMAL
SPECIFIC GRAVITY UA: 1.02 (ref 1–1.03)
TRICHOMONAS PREP: ABNORMAL
TRICYCLIC ANTIDEPRESSANTS SCREEN SERUM: NEGATIVE NG/ML
UROBILINOGEN, URINE: 1 E.U./DL
WBC UA: ABNORMAL /HPF (ref 0–5)
YEAST WET PREP: ABNORMAL

## 2021-06-10 PROCEDURE — 82077 ASSAY SPEC XCP UR&BREATH IA: CPT

## 2021-06-10 PROCEDURE — G0378 HOSPITAL OBSERVATION PER HR: HCPCS

## 2021-06-10 PROCEDURE — 80179 DRUG ASSAY SALICYLATE: CPT

## 2021-06-10 PROCEDURE — 86900 BLOOD TYPING SEROLOGIC ABO: CPT

## 2021-06-10 PROCEDURE — 87210 SMEAR WET MOUNT SALINE/INK: CPT

## 2021-06-10 PROCEDURE — 1240000000 HC EMOTIONAL WELLNESS R&B

## 2021-06-10 PROCEDURE — 86901 BLOOD TYPING SEROLOGIC RH(D): CPT

## 2021-06-10 PROCEDURE — 76801 OB US < 14 WKS SINGLE FETUS: CPT

## 2021-06-10 PROCEDURE — 2500000003 HC RX 250 WO HCPCS: Performed by: NURSE PRACTITIONER

## 2021-06-10 PROCEDURE — 87635 SARS-COV-2 COVID-19 AMP PRB: CPT

## 2021-06-10 PROCEDURE — 6370000000 HC RX 637 (ALT 250 FOR IP): Performed by: EMERGENCY MEDICINE

## 2021-06-10 PROCEDURE — 80307 DRUG TEST PRSMV CHEM ANLYZR: CPT

## 2021-06-10 PROCEDURE — 80143 DRUG ASSAY ACETAMINOPHEN: CPT

## 2021-06-10 PROCEDURE — 96372 THER/PROPH/DIAG INJ SC/IM: CPT

## 2021-06-10 PROCEDURE — 6360000002 HC RX W HCPCS: Performed by: NURSE PRACTITIONER

## 2021-06-10 PROCEDURE — 81001 URINALYSIS AUTO W/SCOPE: CPT

## 2021-06-10 PROCEDURE — 6370000000 HC RX 637 (ALT 250 FOR IP): Performed by: NURSE PRACTITIONER

## 2021-06-10 PROCEDURE — 1200000000 HC SEMI PRIVATE

## 2021-06-10 RX ORDER — LIDOCAINE HYDROCHLORIDE 20 MG/ML
15 SOLUTION OROPHARYNGEAL ONCE
Status: COMPLETED | OUTPATIENT
Start: 2021-06-10 | End: 2021-06-10

## 2021-06-10 RX ORDER — VALACYCLOVIR HYDROCHLORIDE 1 G/1
1000 TABLET, FILM COATED ORAL ONCE
Status: COMPLETED | OUTPATIENT
Start: 2021-06-10 | End: 2021-06-10

## 2021-06-10 RX ORDER — ACETAMINOPHEN 500 MG
1000 TABLET ORAL ONCE
Status: COMPLETED | OUTPATIENT
Start: 2021-06-10 | End: 2021-06-10

## 2021-06-10 RX ORDER — ACETAMINOPHEN 325 MG/1
650 TABLET ORAL EVERY 4 HOURS PRN
Status: DISCONTINUED | OUTPATIENT
Start: 2021-06-10 | End: 2021-06-10 | Stop reason: HOSPADM

## 2021-06-10 RX ORDER — CEFDINIR 300 MG/1
300 CAPSULE ORAL 2 TIMES DAILY
Qty: 14 CAPSULE | Refills: 0 | Status: SHIPPED | OUTPATIENT
Start: 2021-06-10 | End: 2021-06-17

## 2021-06-10 RX ORDER — AZITHROMYCIN 250 MG/1
1000 TABLET, FILM COATED ORAL ONCE
Status: COMPLETED | OUTPATIENT
Start: 2021-06-10 | End: 2021-06-10

## 2021-06-10 RX ORDER — VALACYCLOVIR HYDROCHLORIDE 1 G/1
1000 TABLET, FILM COATED ORAL 2 TIMES DAILY
Qty: 20 TABLET | Refills: 0 | Status: SHIPPED | OUTPATIENT
Start: 2021-06-10 | End: 2021-06-20

## 2021-06-10 RX ORDER — PENICILLIN G POTASSIUM 20000000 [IU]/1
2.4 INJECTION, POWDER, FOR SOLUTION INTRAVENOUS ONCE
Qty: 2.4 MILLION UNITS | Refills: 0 | Status: SHIPPED | OUTPATIENT
Start: 2021-06-17 | End: 2021-06-17

## 2021-06-10 RX ORDER — ONDANSETRON 4 MG/1
4 TABLET, ORALLY DISINTEGRATING ORAL ONCE
Status: COMPLETED | OUTPATIENT
Start: 2021-06-10 | End: 2021-06-10

## 2021-06-10 RX ORDER — ACETAMINOPHEN 500 MG
500 TABLET ORAL EVERY 6 HOURS PRN
Qty: 20 TABLET | Refills: 0 | Status: ON HOLD | OUTPATIENT
Start: 2021-06-10 | End: 2021-06-16 | Stop reason: HOSPADM

## 2021-06-10 RX ORDER — PRENATAL WITH FERROUS FUM AND FOLIC ACID 3080; 920; 120; 400; 22; 1.84; 3; 20; 10; 1; 12; 200; 27; 25; 2 [IU]/1; [IU]/1; MG/1; [IU]/1; MG/1; MG/1; MG/1; MG/1; MG/1; MG/1; UG/1; MG/1; MG/1; MG/1; MG/1
1 TABLET ORAL DAILY
Status: DISCONTINUED | OUTPATIENT
Start: 2021-06-11 | End: 2021-06-16 | Stop reason: HOSPADM

## 2021-06-10 RX ADMIN — LIDOCAINE HYDROCHLORIDE 15 ML: 20 SOLUTION ORAL; TOPICAL at 13:51

## 2021-06-10 RX ADMIN — PENICILLIN G BENZATHINE 2.4 MILLION UNITS: 1200000 INJECTION, SUSPENSION INTRAMUSCULAR at 13:50

## 2021-06-10 RX ADMIN — ONDANSETRON 4 MG: 4 TABLET, ORALLY DISINTEGRATING ORAL at 12:48

## 2021-06-10 RX ADMIN — CEFTRIAXONE SODIUM 500 MG: 250 INJECTION, POWDER, FOR SOLUTION INTRAMUSCULAR; INTRAVENOUS at 12:02

## 2021-06-10 RX ADMIN — AZITHROMYCIN 1000 MG: 250 TABLET, FILM COATED ORAL at 12:01

## 2021-06-10 RX ADMIN — LIDOCAINE HYDROCHLORIDE 15 ML: 20 SOLUTION ORAL; TOPICAL at 17:00

## 2021-06-10 RX ADMIN — VALACYCLOVIR 1000 MG: 1 TABLET, FILM COATED ORAL at 13:51

## 2021-06-10 RX ADMIN — ACETAMINOPHEN 1000 MG: 500 TABLET ORAL at 13:51

## 2021-06-10 ASSESSMENT — SLEEP AND FATIGUE QUESTIONNAIRES
DO YOU HAVE DIFFICULTY SLEEPING: YES
SLEEP PATTERN: DISTURBED/INTERRUPTED SLEEP;RESTLESSNESS
DIFFICULTY FALLING ASLEEP: NO
DIFFICULTY STAYING ASLEEP: YES
DIFFICULTY ARISING: NO
RESTFUL SLEEP: NO
AVERAGE NUMBER OF SLEEP HOURS: 6
DO YOU USE A SLEEP AID: NO

## 2021-06-10 ASSESSMENT — PATIENT HEALTH QUESTIONNAIRE - PHQ9: SUM OF ALL RESPONSES TO PHQ QUESTIONS 1-9: 18

## 2021-06-10 ASSESSMENT — PAIN SCALES - GENERAL
PAINLEVEL_OUTOF10: 0
PAINLEVEL_OUTOF10: 5

## 2021-06-10 ASSESSMENT — LIFESTYLE VARIABLES: HISTORY_ALCOHOL_USE: NO

## 2021-06-10 NOTE — ED NOTES
Pt to [address] [facility] [address] with transport and constant observer     Costa Cota, CIERRA  06/10/21 0054

## 2021-06-10 NOTE — LETTER
[place] Department Medicaid  CERTIFICATION OF NECESSITY  FOR NON-EMERGENCY TRANSPORTATION   BY GROUND AMBULANCE      Individual Information   1. Name: Elmer Ortega 2. [place] Medicaid Billing Number:    3. Address: [address] [address]      Transportation Provider Information   4. Provider Name: [facility]   5. [place] Medicaid Provider Number:  National Provider Identifier (NPI):      Certification  7. Criteria:  During transport, this individual requires:  [] Medical treatment or continuous     supervision by an EMT. [] The administration or regulation of oxygen by another person. [] Supervised protective restraint. 8. Period Beginning Date: 6/10/2021   9. Length  [] Not more than 1 day(s)  [] One Year     Additional Information Relevant to Certification   10. Comments or Explanations, If Necessary or Appropriate   Pt is pink slipped for SI, diagnosed with depression and is in need of an inpatient admission for MH stabilization and safety. No beds at current facility or closer. Certifying Practitioner Information   11. Name of Practitioner:    12. [place] Medicaid Provider Number, If Applicable:  [address] Provider Identifier (NPI):      Signature Information   14. Date of Signature: 6/10/2021 15. Name of Person Signing: Popeye Le   16.  Signature and Professional Designation:      [facility] [id number]  Rev. 7/2015

## 2021-06-10 NOTE — ED NOTES
Attempted to call report to [facility] ED, was placed on hold for 5 1/2 min without anyone picking up the phone, will try again     Maynor Dixon RN  06/09/21 [phone]

## 2021-06-10 NOTE — ED NOTES
Pt verbally threatening staff. Pt states she will fight to get out of this hospital. Pt was told we are waiting on bed for her. Pt now calm and laying in bed at this time. No distress noted.      Romayne Gaba, RN  06/10/21 0948

## 2021-06-10 NOTE — CARE COORDINATION
Social Work/Transition of Care:    STEVIE notified BELINDA STEVIE Key pt is in the ED and in need of Telehealth assessment.     Electronically signed by Ken Morgan on 1/67/5368 at 11:03 AM

## 2021-06-10 NOTE — ED NOTES
Pt verbally threatening to fight staff again and screaming profanities. Social work in room now for tele evaluation. Continuous 1:1 observation at bedside.        David Adamson RN  06/10/21 8445

## 2021-06-10 NOTE — ED PROVIDER NOTES
ED Attending  CC: [facility]  Department of Emergency Medicine   ED  Encounter Note  Admit Date/RoomTime: 6/10/2021  1:58 AM  ED Room:     NAME: Rc Kramer  : 2000  MRN: [id number]     Chief Complaint:  Abdominal Pain (sent in by [facility] for ultrasound 13 weeks pregnant, diffuse lower abd pain, pt states that a dog jumped on her abd this evening, light spotting)    History of Present Illness       Sarah Cochran is a 23 y.o. old female who presents to the emergency department by private vehicle, for sudden onset cramping pain in the lower abdomen without radiation which began a few hour(s) prior to arrival.   There has been no similar episodes in the past.  Patient reports one of her friends large dog stepped on her stomach. Patient reports she is 13 weeks pregnant. Since onset the symptoms have been persistent. The pain is associated with nothing pertinent. The pain is aggravated by lying back and relieved by nothing. There has been NO cloudy urine, constipation, diarrhea, dysuria, vaginal discharge, vaginal itching, vomiting or fever . Patient was seen at outlying facility and was sent to this hospital for ultrasound. By the time she arrived here ultrasound is no longer in hospital.  After I finished discussing her initial chief complaint patient reports that she also has been suicidal for the past couple weeks. She reports she is going through a bad break-up and has been thinking about jumping off a bridge or hanging herself in the basement. She reports she lives with her mother and her mother knows that she is having the symptoms. She reports she has had suicidal attempts and thoughts before. She has been on psychiatric medications in the past but is not currently. She reports she smokes marijuana daily to try to help herself relax and forget about her problems. Patient's last menstrual period was 2021 (exact date). .   OB History    1    Para        Term                AB        Living             SAB        TAB        Ectopic        Molar        Multiple        Live Births                . Pregnant. .    . ROS   Pertinent positives and negatives are stated within HPI, all other systems reviewed and are negative. Past Medical History:  has a past medical history of ADHD, Autism, Bipolar 1 disorder ([address] and PTSD (post-traumatic stress disorder). Surgical History has no past surgical history on file. Social History:  reports that she has been smoking cigarettes. She has been smoking about 0.50 packs per day. She has never used smokeless tobacco. She reports current drug use. Drug: Marijuana. She reports that she does not drink alcohol. Family History: family history is not on file. Allergies: Shellfish-derived products    Physical Exam   Oxygen Saturation Interpretation: Normal.        ED Triage Vitals   BP Temp Temp Source Pulse Resp SpO2 Height Weight   06/10/21 0009 21 2305 21 2305 21 2305 21 2305 21 2305 21 2305 21 2305   (!) 128/59 97.8 °F (36.6 °C) Tympanic 117 18 96 % 5' 3\" (1.6 m) 169 lb (76.7 kg)          General Appearance/Constitutional:  Alert, development consistent with age. HEENT:  NC/NT. PERRLA. Airway patent. Neck:  Supple. No lymphadenopathy. Respiratory:  No retractions. Lungs Clear to auscultation and breath sounds equal.  CV:  Regular rate and rhythm. GI:  normal appearing, non-distended with no visible hernias. Bowel sounds: normal bowel sounds. Tenderness: No abdominal tenderness, guarding, rebound, rigidity or pulsatile mass. .        Liver: non-tender. Spleen:  non-tender. Back: CVA Tenderness: No.  : /Pelvic examination deferred/not indicated. Integument:  Normal turgor. Warm, dry, without visible rash, unless noted elsewhere. Lymphatics: No edema, cap.refill <3sec.   Neurological: Orientation age-appropriate. Motor functions intact. Lab / Imaging Results   (All laboratory and radiology results have been personally reviewed by myself)  Labs:  Results for orders placed or performed during the hospital encounter of 06/10/21   COVID-19, Rapid    Specimen: Nasopharyngeal Swab   Result Value Ref Range    SARS-CoV-2, NAAT Not Detected Not Detected   Serum Drug Screen   Result Value Ref Range    Ethanol Lvl <10 mg/dL    Acetaminophen Level <5.0 (L) 10.0 - 09.6 mcg/mL    Salicylate, Serum <7.6 0.0 - 30.0 mg/dL   ABO/RH   Result Value Ref Range    ABO/Rh A NEG      Imaging: All Radiology results interpreted by Radiologist unless otherwise noted. No orders to display     ED Course / Medical Decision Making   Medications - No data to display     Re-Evaluations:  6/10/21      Time:   Consultations:             None    Procedures:   Dr. Lucky Merlin performed a bedside ultrasound which showed heart rate of 137, fetal activity is visualized. MDM: Patient presents to emergency from outlying facility for ultrasound due to having been stepped on by a large dog and she is continuing to have abdominal pain. She is 13 weeks pregnant. She follows with Dr. Dana Alberto for OB/GYN. While in department patient reports she has been suicidal for the past couple weeks. She reports she just moved here from [place] is living with her mother and has been considering suicide since being back here due to a bad break-up with the father of baby. Patient reports a history of psychiatric problems and suicidal attempts in the past as well as having been on medications before for this. Patient is reporting a plan such as jumping off a bridge or hanging herself in her mother's basement. She reports her mother is aware of her symptoms and keeps a fairly close eye on her. She wants to be evaluated and try to get her life back on track.   Care will be continued by attending physician, Dr. Nabila Velasquez until patient is medically clear for . Patient's laboratory work-up unremarkable. Labs also reviewed from outside facility. Patient medically cleared. Plan of Care/Counseling:  Myself: Diomedes Smith PA-C and Emergency Attending Physician reviewed today's visit with the patient in addition to providing specific details for the plan of care and counseling regarding the diagnosis and prognosis. Questions are answered at this time and are agreeable with the plan. Assessment      1. Lower abdominal pain    2. Depression with suicidal ideation      This patient's ED course included: a personal history and physicial examination and re-evaluation prior to disposition  This patient has remained hemodynamically stable during their ED course. Plan       New Medications     New Prescriptions    No medications on file     Electronically signed by Shaina Hussein DO   DD: 6/10/21  **This report was transcribed using voice recognition software. Every effort was made to ensure accuracy; however, inadvertent computerized transcription errors may be present.   END OF PROVIDER NOTE       Diomedes Smith PA-C  06/10/21 0425       Diomedes Smith PA-C  06/10/21 Erna. Rhiannon Lynch DO  06/10/21 7444

## 2021-06-10 NOTE — ED NOTES
Called US, notified pt is pinkslipped and requires 1:1 CO at all times.      Gera Morin RN  06/10/21 2300

## 2021-06-10 NOTE — ED NOTES
Access line called.  Dr. Ann Pierre talking with Dr.Temple Dulce Bamberger  06/09/21 1285       Dulce Bamberger  06/09/21 8880

## 2021-06-10 NOTE — ED NOTES
The pt was accepted to [facility] room 7303B. Disposition called to Juli Vargas in admitting.        Alvaro Maier, [facility]  06/10/21 [address], [facility]  06/10/21 5252

## 2021-06-10 NOTE — ED NOTES
ED Attending  CC: [facility]  Department of Emergency Medicine   ED  Encounter Note  Admit Date/RoomTime: 6/10/2021  1:58 AM  ED Room:     I was asked to do a  Pelvic examination on a patient awaiting medical clearance and is 15 W pregnant  and has complaints of vaginal discharge with lesions and extreme pain and redness to perineum for the past week. ROS   Pertinent positives and negatives are stated within HPI, all other systems reviewed and are negative. Past Medical History:  has a past medical history of ADHD, Autism, Bipolar 1 disorder ([address] and PTSD (post-traumatic stress disorder). Surgical History:  has no past surgical history on file. Social History:  reports that she has been smoking cigarettes. She has been smoking about 0.50 packs per day. She has never used smokeless tobacco. She reports current drug use. Drug: Marijuana. She reports that she does not drink alcohol. Family History: family history is not on file. Allergies: Shellfish-derived products    Physical Exam   Oxygen Saturation Interpretation: Normal.        ED Triage Vitals   BP Temp Temp Source Pulse Resp SpO2 Height Weight   06/10/21 0009 21 2305 21 2305 21 2305 21 2305 21 2305 21 2305 21 2305   (!) 128/59 97.8 °F (36.6 °C) Tympanic 104 18 96 % 5' 3\" (1.6 m) 169 lb (76.7 kg)         General Appearance/Constitutional:  Alert, development consistent with age, NAD. HEENT:  NC/NT. PERRLA. Airway patent. Neck:  Supple. No lymphadenopathy. Respiratory:  Clear to auscultation and breath sounds equal.  CV:  Regular rate and rhythm. GI:  normal appearing, non-distended with no visible hernias. Bowel sounds: normal bowel sounds. Tenderness: No abdominal tenderness, guarding, rebound, rigidity or pulsatile mass. .        Liver: non-tender and non-palpable. Spleen:  non-tender and non-palpable.   Back: CVA Tenderness: No.  : (chaperone present during examination). [address] [place] [address] Drive Dr. Corrinne Check and Abhinav Calixto present during examination       External Genitalia: Abnormal findings: multiple condolomas and hepatic ulcerations noted to external labia and perianal region with beefy red rash to perineum. Hemorrhagic ulceration noted in the mons pubis region. Urethral Meatus: Abnormal findings: lesions. Vagina: discharge thick white and Wet Prep/KOH. Cervix: normal appearing cervix without discharge or lesions, cervical motion tenderness absent, lesions absent and nulliparous os. Uterus:  normal size, contour, position, consistency, mobility, non-tender, enlarged, 13  weeks size. Adenexa: Masses absent, no tenderness bilaterally. Anus/Perineum: Erythema with herpetic ulcerations and condylomas. Integument:  Normal turgor. Warm, dry, without visible rash, unless noted elsewhere. Lymphatics: No lymphangitis or adenopathy noted. Neurological:  Orientation age-appropriate. Motor functions intact.     Lab / Imaging Results   (All laboratory and radiology results have been personally reviewed by myself)  Labs:  Results for orders placed or performed during the hospital encounter of 06/10/21   COVID-19, Rapid    Specimen: Nasopharyngeal Swab   Result Value Ref Range    SARS-CoV-2, NAAT Not Detected Not Detected   Urinalysis   Result Value Ref Range    Color, UA Yellow Straw/Yellow    Clarity, UA Clear Clear    Glucose, Ur Negative Negative mg/dL    Bilirubin Urine Negative Negative    Ketones, Urine Negative Negative mg/dL    Specific Gravity, UA 1.020 1.005 - 1.030    Blood, Urine TRACE-INTACT Negative    pH, UA 7.0 5.0 - 9.0    Protein, UA Negative Negative mg/dL    Urobilinogen, Urine 1.0 <2.0 E.U./dL    Nitrite, Urine Negative Negative    Leukocyte Esterase, Urine LARGE (A) Negative   URINE DRUG SCREEN   Result Value Ref Range    Amphetamine Screen, Urine NOT DETECTED Negative <1000 ng/mL Barbiturate Screen, Ur NOT DETECTED Negative < 200 ng/mL    Benzodiazepine Screen, Urine NOT DETECTED Negative < 200 ng/mL    Cannabinoid Scrn, Ur POSITIVE (A) Negative < 50ng/mL    Cocaine Metabolite Screen, Urine NOT DETECTED Negative < 300 ng/mL    Opiate Scrn, Ur NOT DETECTED Negative < 300ng/mL    PCP Screen, Urine NOT DETECTED Negative < 25 ng/mL    Methadone Screen, Urine NOT DETECTED Negative <300 ng/mL    Oxycodone Urine NOT DETECTED Negative <100 ng/mL    FENTANYL SCREEN, URINE NOT DETECTED Negative <1 ng/mL    Drug Screen Comment: see below    Serum Drug Screen   Result Value Ref Range    Ethanol Lvl <10 mg/dL    Acetaminophen Level <5.0 (L) 10.0 - 78.0 mcg/mL    Salicylate, Serum <1.7 0.0 - 30.0 mg/dL    TCA Scrn NEGATIVE Cutoff:300 ng/mL   Microscopic Urinalysis   Result Value Ref Range    WBC, UA 5-10 (A) 0 - 5 /HPF    RBC, UA 1-3 0 - 2 /HPF    Epithelial Cells, UA MODERATE /HPF    Bacteria, UA MODERATE (A) None Seen /HPF    Crystals, UA Moderate (A) None Seen /HPF   ABO/RH   Result Value Ref Range    ABO/Rh A NEG      Imaging: All Radiology results interpreted by Radiologist unless otherwise noted.   US OB LESS THAN 14 WEEKS SINGLE OR FIRST GESTATION   Final Result   Single living IUP of 14 weeks and 6 days plus/minus 1-2 weeks           ED Course / Medical Decision Making     Medications   cefTRIAXone (ROCEPHIN) 500 mg in lidocaine 1 % 2 mL IM Injection (500 mg Intramuscular Given 6/10/21 1202)   azithromycin (ZITHROMAX) tablet 1,000 mg (1,000 mg Oral Given 6/10/21 1201)   valACYclovir (VALTREX) tablet 1,000 mg (1,000 mg Oral Given 6/10/21 1351)   lidocaine viscous hcl (XYLOCAINE) 2 % solution 15 mL (15 mLs Mouth/Throat Given 6/10/21 1351)   acetaminophen (TYLENOL) tablet 1,000 mg (1,000 mg Oral Given 6/10/21 1351)   ondansetron (ZOFRAN-ODT) disintegrating tablet 4 mg (4 mg Oral Given 6/10/21 1248)   penicillin G benzathine (BICILLIN L-A) [id number] UNIT/2ML suspension 2.4 Million Units (2.4 Million Units Intramuscular Given 6/10/21 1350)     ED Course as of Evan 10 1411   Thu Evan 10, 2021   1150 Dr. Martina Middleton into examine patient.    [SE]      ED Course User Index  [SE] Brittni Whartonsaba, APRN - CNP     Re-examination:  6/10/21       Time: 1209 patient advised that Dr. Soledad Goff feels that she can be treated with Valtrex and lidocaine topically and other measures for symptomatic relief. Patients condition remains stable. 1314 Discussed diagnosis of syphilis with patient and need for reevaluation and treatment  Consults:   IP CONSULT TO OB GYN   1202 Discussed patient with Dr. Victoria Zapata and will treat with Valtrex 1 g twice daily for 10 days and tell the patient to urinate in the bathtub for symptomatic relief and lidocaine viscous 5% gel 4-5 times a day. He reports that she has pending cultures and pending HSV labs. [phone]    Dr. Pollo Downey called back and states that the patient is positive for syphilis and will need treated today and she will need a follow-up injection of penicillin G and additional blood work and can follow-up as soon as she is discharged from the hospital in his office. Patient did test positive for HSV 1 and 2 and was negative for gonorrhea or chlamydia. She was negative for HIV or hepatitis C. He reports that all labs were done at [address] [place] [address] if anyone needs to look them up. Procedures:   none    MDM:   This is a 68-year-old female who I was asked to go into the room and examined she was found to have multiple condyloma cumulus and erythematous ulcerations to bilateral labia and perianal region with excoriation to her perineum and moderate amount of pain. Patient's case was discussed with her gynecologist and she was found to be positive for syphilis and HSV 1 and 2 and most likely having an outbreak she was given Valtrex in the ED she was also given 1 dose of penicillin G2 400,000 units and will need a repeat dose in 7 days.   Patient also was found to have a UTI and will give her [facility] prescription along with a prescription for Valtrex, penicillin G so the next facility can medicate appropriately per her gynecologist recommendations. Her son was completed and reveals a single live intrauterine gestation cervical os closed and normal volume of amniotic fluid approximately with 14 weeks 6 days ±1 to 2 weeks. Cardiac activity is noted with a fetal heart rate of 146. Patient advised on importance of getting second dose of penicillin G and close follow-up with her gynecologist.  Delores Frost on safe sex practices partner notification. Patient will be discharged to a mental health facility and is currently pink slipped. Plan of Care/Counseling:  Myself: MONICA Smith CNP reviewed today's visit with the patient in addition to providing specific details for the plan of care and counseling regarding the diagnosis and prognosis. Questions are answered at this time and are agreeable with the plan. Assessment      1. Lower abdominal pain    2. Depression with suicidal ideation    3. Genital herpes simplex, unspecified site    4. Condyloma acuminata of vulva during pregnancy in second trimester    5. Urinary tract infection in mother during second trimester of pregnancy    6. Syphilis affecting pregnancy, second trimester      Plan   Discharge to [facility] / Behavioral Unit - Patient is medically cleared for mental/behavioral health unit admission.   Patient condition is stable    New Medications     New Prescriptions    ACETAMINOPHEN (APAP EXTRA STRENGTH) 500 MG TABLET    Take 1 tablet by mouth every 6 hours as needed for Pain    CEFDINIR (OMNICEF) 300 MG CAPSULE    Take 1 capsule by mouth 2 times daily for 7 days    LIDOCAINE, ANORECTAL, 5 % GEL    Apply 1 Dose topically 5 times daily    PENICILLIN G POTASSIUM [id number] UNITS INJECTION    Inject 2.4 Million Units into the muscle once for 1 dose    VALACYCLOVIR (VALTREX) 1 G TABLET    Take 1 tablet by mouth 2 times daily for 10 days Electronically signed by MONICA Snell CNP   DD: 6/10/21  **This report was transcribed using voice recognition software. Every effort was made to ensure accuracy; however, inadvertent computerized transcription errors may be present.   END OF ED PROVIDER NOTE     MONICA Snell CNP  06/10/21 9016

## 2021-06-10 NOTE — CARE COORDINATION
Social Work/Transition of Care:    STEVIE contacted [employer] and spoke with [address]. Transportation scheduled with ETA 2 to 3 hours. STEVIE completed Medical Necessity form and updated ED nurse, [address].

## 2021-06-10 NOTE — ED NOTES
Ok to admit pt to [facility] unit per Anahi Beal NP due to pt being 14 weeks preg approx     Meena Noel, CIERRA  06/10/21 6116

## 2021-06-10 NOTE — ED PROVIDER NOTES
There are no other injuries. HPI:  6/9/21,   Time: 9:50 PM EDT         Kaden Campo is a 23 y.o. female presenting to the ED for abdominal pain after her 300 pound dog landed on her abdomen. Patient is approximately 13 weeks pregnant patient states she is spotting, beginning 4 hrs ago. The complaint has been persistent, moderate in severity, and worsened by movement. This is patient's first pregnancy. She stated she may have spotting earlier. She noticed some dark material in her urine. She is not passing any clots or tissue at this time. She just saw her OB/GYN doctor who is Dr. Jes Gleason from [place]. She just had an ultrasound and she is 13 weeks along. there are no other injuries. .    ROS:   Pertinent positives and negatives are stated within HPI, all other systems reviewed and are negative.  --------------------------------------------- PAST HISTORY ---------------------------------------------  Past Medical History:  has a past medical history of ADHD, Autism, Bipolar 1 disorder ([facility] [address] and PTSD (post-traumatic stress disorder). Past Surgical History:  has no past surgical history on file. Social History:  reports that she has been smoking cigarettes. She has been smoking about 0.50 packs per day. She has never used smokeless tobacco. She reports current drug use. Drug: Marijuana. She reports that she does not drink alcohol. Family History: family history is not on file. The patients home medications have been reviewed.     Allergies: Shellfish-derived products    -------------------------------------------------- RESULTS -------------------------------------------------  All laboratory and radiology results have been personally reviewed by myself   LABS:  Results for orders placed or performed during the hospital encounter of 06/09/21   CBC auto differential   Result Value Ref Range    WBC 13.8 (H) 4.5 - 11.5 E9/L    RBC 3.86 3.50 - 5.50 E12/L    Hemoglobin 11.5 11.5 - 15.5 g/dL Hematocrit 32.2 (L) 34.0 - 48.0 %    MCV 83.4 80.0 - 99.9 fL    MCH 29.8 26.0 - 35.0 pg    MCHC 35.7 (H) 32.0 - 34.5 %    RDW 13.7 11.5 - 15.0 fL    Platelets 766 787 - 854 E9/L    MPV 9.5 7.0 - 12.0 fL    Neutrophils % 74.4 43.0 - 80.0 %    Immature Granulocytes % 0.8 0.0 - 5.0 %    Lymphocytes % 15.3 (L) 20.0 - 42.0 %    Monocytes % 5.4 2.0 - 12.0 %    Eosinophils % 3.8 0.0 - 6.0 %    Basophils % 0.3 0.0 - 2.0 %    Neutrophils Absolute 10.24 (H) 1.80 - 7.30 E9/L    Immature Granulocytes # 0.11 E9/L    Lymphocytes Absolute 2.11 1.50 - 4.00 E9/L    Monocytes Absolute 0.74 0.10 - 0.95 E9/L    Eosinophils Absolute 0.53 (H) 0.05 - 0.50 E9/L    Basophils Absolute 0.04 0.00 - 0.20 E9/L       RADIOLOGY:  Interpreted by Radiologist.  No orders to display       ------------------------- NURSING NOTES AND VITALS REVIEWED ---------------------------   The nursing notes within the ED encounter and vital signs as below have been reviewed. /85   Pulse 124   Temp 97.1 °F (36.2 °C) (Infrared)   Resp 18   Ht 5' 3\" (1.6 m)   Wt 159 lb 9.6 oz (72.4 kg)   LMP 03/01/2021 (Exact Date)   SpO2 98%   BMI 28.27 kg/m²   Oxygen Saturation Interpretation: Normal      ---------------------------------------------------PHYSICAL EXAM--------------------------------------      Constitutional/General: Alert and oriented x3, well appearing, non toxic in NAD  Head: NC/AT  Eyes: PERRL, EOMI  Mouth: Oropharynx clear, handling secretions, no trismus  Neck: Supple, full ROM, no meningeal signs  Pulmonary: Lungs clear to auscultation bilaterally, no wheezes, rales, or rhonchi. Not in respiratory distress  Cardiovascular:  Regular rate and rhythm, no murmurs, gallops, or rubs. 2+ distal pulses  Abdomen: Soft, moderately tender right lower quadrant and suprapubic area. Denies passing any clots or any bleeding or spotting at this time. Pelvic examination deferred  Extremities: Moves all extremities x 4.  Warm and well perfused  Skin: warm and dry without rash  Neurologic: GCS 15,  Psych: Normal Affect      ------------------------------ ED COURSE/MEDICAL DECISION MAKING----------------------  Medications   acetaminophen (TYLENOL) tablet 1,000 mg (has no administration in time range)         Medical Decision Making:   Patient needs ultrasound to assess the baby and it was explained to the patient that there is no ultrasound here at this facility at this time. Patient was offered transport per EMS in which case we would start an IV however she states her mother lives only 5 minutes away and she does not wish to wait for the EMS. This was again offered to her at approximately 1030. Again she states her mother only lives 5 minutes away. She is complaining of increasing pain in the lower abdomen. Vital signs are being rechecked. Counseling: The emergency provider has spoken with the patient and discussed todays results, in addition to providing specific details for the plan of care and counseling regarding the diagnosis and prognosis. Questions are answered at this time and they are agreeable with the plan.      --------------------------------- IMPRESSION AND DISPOSITION ---------------------------------    IMPRESSION  1. Lower abdominal pain    2. Complication of pregnancy, antepartum    3.  Blunt abdominal trauma, initial encounter        DISPOSITION  Disposition: Discharge to go to emergency room at [facility] for fetal ultrasound and further evaluation  Patient condition is stable                 Chnag Rubio MD  06/11/21 0123

## 2021-06-10 NOTE — ED NOTES
Nurse to nurse called to [address] [place] [address] [phone]. Bed 7306B Spoke to Seferino Flowers.   ETA for physicians Ambulance [id number]     Julio Fisher, RN  06/10/21 [address], RN  06/10/21 2203

## 2021-06-10 NOTE — ED NOTES
Emergency Department [facility] AN AFFILIATE OF [facility] Biopsychosocial Assessment Note    Chief Complaint: The pt is a 23 yr old white female who presents to the ED with a suicidal plan to hang herself. MSE: The pt presents calm and cooperative with a flat affect and depressed mood- tearful at times. She is oriented times 4. She denies a hx of AVH. Clinical Summary/History: The pt stated that she initially came in to get her pregnancy checked b/c she got walked on by her dog. She stated that while here she decided to be straight forward with the fact that she has been depressed and suicidal.  She stated that she just moved here 2 weeks ago from [place] b/c she is 13 weeks pregnant and the dad is a drug addict and \"a deadbeat\". She stated that on memorial day she had the plan to OD on pills and jump in the river to never be found. She stated that today she thought that she would hang herself in the garage and that her mom would find her. She stated that she is getting used to being with a mom that is new in her life. The pt reported that last yr she came here and was suicidal and was admitted to [facility] twice and referred to [address] [place] [address]. She stated that she never f/u b/c she moved back to [place]. She denied a hx of HI and AVH. She reported that she has been sober 1 yr. The pt will be reviewed for admission. Gender  [] Male [x] Female [] Transgender  [] Other    Sexual Orientation    [x] Heterosexual [] Homosexual [] Bisexual [] Other    Suicidal Behavioral: CSSR-S Complete. [x] Reports: Wanted to hang herself today. [x] Past [] Present   [] Denies    Homicidal/ Violent Behavior  [] Reports:   [] Past [] Present   [x] Denies     Hallucinations/Delusions   [] Reports:   [x] Denies     Substance Use/Alcohol Use/Addiction: SBIRT Screen Complete.    [] Reports: Sober 1 yr  [x] Denies     Trauma History  [] Reports:  [x] Denies     Collateral Information: None      Level of Care/Disposition Plan  [] Home:   [] Outpatient Provider:   [] Crisis Unit:   [x] Inpatient Psychiatric Unit: [facility]  [] Other:        Nahid Raines, [facility]  06/10/21 [address], [facility]  06/10/21 0009

## 2021-06-11 PROBLEM — F31.63 BIPOLAR DISORDER, CURR EPISODE MIXED, SEVERE, W/O PSYCHOTIC FEATURES (HCC): Status: ACTIVE | Noted: 2021-06-11

## 2021-06-11 LAB
CHOLESTEROL, TOTAL: 147 MG/DL (ref 0–199)
HBA1C MFR BLD: 4.9 % (ref 4–5.6)
HDLC SERPL-MCNC: 47 MG/DL
LDL CHOLESTEROL CALCULATED: 75 MG/DL (ref 0–99)
TRIGL SERPL-MCNC: 126 MG/DL (ref 0–149)
VLDLC SERPL CALC-MCNC: 25 MG/DL

## 2021-06-11 PROCEDURE — 6370000000 HC RX 637 (ALT 250 FOR IP): Performed by: PSYCHIATRY & NEUROLOGY

## 2021-06-11 PROCEDURE — 1240000000 HC EMOTIONAL WELLNESS R&B

## 2021-06-11 PROCEDURE — 80061 LIPID PANEL: CPT

## 2021-06-11 PROCEDURE — 83036 HEMOGLOBIN GLYCOSYLATED A1C: CPT

## 2021-06-11 PROCEDURE — 36415 COLL VENOUS BLD VENIPUNCTURE: CPT

## 2021-06-11 PROCEDURE — 6370000000 HC RX 637 (ALT 250 FOR IP): Performed by: NURSE PRACTITIONER

## 2021-06-11 PROCEDURE — 6370000000 HC RX 637 (ALT 250 FOR IP): Performed by: FAMILY MEDICINE

## 2021-06-11 PROCEDURE — 99222 1ST HOSP IP/OBS MODERATE 55: CPT | Performed by: NURSE PRACTITIONER

## 2021-06-11 RX ORDER — CEFDINIR 300 MG/1
300 CAPSULE ORAL 2 TIMES DAILY
Status: DISCONTINUED | OUTPATIENT
Start: 2021-06-11 | End: 2021-06-13

## 2021-06-11 RX ORDER — LIDOCAINE HYDROCHLORIDE 20 MG/ML
JELLY TOPICAL PRN
Status: DISCONTINUED | OUTPATIENT
Start: 2021-06-11 | End: 2021-06-16 | Stop reason: HOSPADM

## 2021-06-11 RX ORDER — METRONIDAZOLE 500 MG/1
500 TABLET ORAL ONCE
Status: COMPLETED | OUTPATIENT
Start: 2021-06-11 | End: 2021-06-11

## 2021-06-11 RX ORDER — CEFDINIR 300 MG/1
300 CAPSULE ORAL 2 TIMES DAILY
Status: DISCONTINUED | OUTPATIENT
Start: 2021-06-11 | End: 2021-06-11

## 2021-06-11 RX ORDER — ACYCLOVIR 200 MG/1
800 CAPSULE ORAL 3 TIMES DAILY
Status: DISCONTINUED | OUTPATIENT
Start: 2021-06-11 | End: 2021-06-14

## 2021-06-11 RX ORDER — ACETAMINOPHEN 500 MG
500 TABLET ORAL EVERY 6 HOURS PRN
Status: DISCONTINUED | OUTPATIENT
Start: 2021-06-11 | End: 2021-06-16 | Stop reason: HOSPADM

## 2021-06-11 RX ADMIN — METFORMIN HYDROCHLORIDE 1 TABLET: 500 TABLET, EXTENDED RELEASE ORAL at 08:44

## 2021-06-11 RX ADMIN — ACYCLOVIR 800 MG: 200 CAPSULE ORAL at 14:02

## 2021-06-11 RX ADMIN — METRONIDAZOLE 500 MG: 500 TABLET ORAL at 12:22

## 2021-06-11 RX ADMIN — ACYCLOVIR 800 MG: 200 CAPSULE ORAL at 08:44

## 2021-06-11 RX ADMIN — CEFDINIR 300 MG: 300 CAPSULE ORAL at 21:26

## 2021-06-11 RX ADMIN — CEFDINIR 300 MG: 300 CAPSULE ORAL at 08:44

## 2021-06-11 RX ADMIN — ACYCLOVIR 800 MG: 200 CAPSULE ORAL at 21:26

## 2021-06-11 ASSESSMENT — SLEEP AND FATIGUE QUESTIONNAIRES
DO YOU HAVE DIFFICULTY SLEEPING: YES
DIFFICULTY ARISING: YES
RESTFUL SLEEP: NO
DIFFICULTY STAYING ASLEEP: YES
AVERAGE NUMBER OF SLEEP HOURS: 6
DO YOU USE A SLEEP AID: NO
SLEEP PATTERN: DIFFICULTY FALLING ASLEEP
DIFFICULTY FALLING ASLEEP: YES

## 2021-06-11 ASSESSMENT — PAIN SCALES - GENERAL
PAINLEVEL_OUTOF10: 3
PAINLEVEL_OUTOF10: 0

## 2021-06-11 ASSESSMENT — PAIN DESCRIPTION - DESCRIPTORS: DESCRIPTORS: ITCHING;TENDER

## 2021-06-11 ASSESSMENT — PAIN DESCRIPTION - PAIN TYPE: TYPE: ACUTE PAIN

## 2021-06-11 ASSESSMENT — PATIENT HEALTH QUESTIONNAIRE - PHQ9: SUM OF ALL RESPONSES TO PHQ QUESTIONS 1-9: 19

## 2021-06-11 ASSESSMENT — LIFESTYLE VARIABLES: HISTORY_ALCOHOL_USE: NO

## 2021-06-11 ASSESSMENT — PAIN DESCRIPTION - LOCATION: LOCATION: VAGINA

## 2021-06-11 NOTE — BH NOTE
Pt denies SI HI and hallucinations. Pt stated \"I just woke up, I don't know how I am feeling yet\". Pt observed to be sitting out on the unit during group actively participating. Pt is social with staff and peers, appears bright and cheerful. Pt c/o \"itching and pain down there because I'm pregnant\". Pt has poor insight as to her STD. Pt is childlike at times. Pt is medication compliant. Pt is eating provided meals. No voiced depression or anxiety. No voiced paranoia or delusions. No aggression or behaviors. We will continue to provide support and comfort for the patient.
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
## 2021-06-11 NOTE — PLAN OF CARE
[address] [facility]  Initial Interdisciplinary Treatment Plan NOTE    Review Date & Time: 6/11/2021 0900    Patient was in treatment team    Admission Type:   Admission Type:  Involuntary    Reason for admission:  Reason for Admission: SUICIDAL DEPRESSION      Estimated Length of Stay Update:  3-5 days  Estimated Discharge Date Update: 6/14/2021    PATIENT STRENGTHS:  Patient Strengths Strengths: Positive Support, Social Skills, No significant Physical Illness  Patient Strengths and Limitations:Limitations: Difficulty problem solving/relies on others to help solve problems  Addictive Behavior:Addictive Behavior  In the past 3 months, have you felt or has someone told you that you have a problem with:  : None  Do you have a history of Chemical Use?: No  Do you have a history of Alcohol Use?: No  Do you have a history of Street Drug Abuse?: No  Histroy of Prescripton Drug Abuse?: No  Medical Problems:  Past Medical History:   Diagnosis Date    ADHD     Autism     Bipolar 1 disorder ([facility] [address]     PTSD (post-traumatic stress disorder)        EDUCATION:   Learner Progress Toward Treatment Goals: Reviewed results and recommendations of this team    Method: Small group    Outcome: Verbalized understanding    PATIENT GOALS: Talk about my feelings    PLAN/TREATMENT RECOMMENDATIONS UPDATE: Encourage group participation and continue to provide emotional support    GOALS UPDATE:   Time frame for Short-Term Goals: 1-3 days    Zakiya Zarate RN

## 2021-06-11 NOTE — PLAN OF CARE
Problem: Depressive Behavior With or Without Suicide Precautions:  Goal: Able to verbalize acceptance of life and situations over which he or she has no control  Description: Able to verbalize acceptance of life and situations over which he or she has no control  Outcome: Met This Shift     Problem: Depressive Behavior With or Without Suicide Precautions:  Goal: Able to verbalize and/or display a decrease in depressive symptoms  Description: Able to verbalize and/or display a decrease in depressive symptoms  Outcome: Met This Shift     Problem: Pain:  Goal: Control of acute pain  Description: Control of acute pain  Outcome: Met This Shift

## 2021-06-11 NOTE — PROGRESS NOTES
`[facility]  Admission Note     Admission Type:   Admission Type:  Involuntary    Reason for admission:  Reason for Admission: SUICIDAL DEPRESSION    PATIENT STRENGTHS:  Strengths: Positive Support    Patient Strengths and Limitations:  Limitations: Difficulty problem solving/relies on others to help solve problems    Addictive Behavior:   Addictive Behavior  In the past 3 months, have you felt or has someone told you that you have a problem with:  : None  Do you have a history of Chemical Use?: Yes  Do you have a history of Alcohol Use?: No  Do you have a history of Street Drug Abuse?: Yes  Histroy of Prescripton Drug Abuse?: No    Medical Problems:   Past Medical History:   Diagnosis Date    ADHD     Autism     Bipolar 1 disorder ([facility] [address]     PTSD (post-traumatic stress disorder)        Status EXAM:  Status and Exam  Normal: Yes  Facial Expression: Sad  Affect: Appropriate  Level of Consciousness: Alert  Mood:Normal: Yes  Motor Activity:Normal: Yes  Interview Behavior: Cooperative  Preception: [place] to Person, [place] to Time, [place] to Place, [place] to Situation  Attention:Normal: Yes  Thought Content:Normal: Yes  Hallucinations: None  Delusions: No  Memory:Normal: Yes  Insight and Judgment: Yes  Present Suicidal Ideation: No  Present Homicidal Ideation: No    Tobacco Screening:  Practical Counseling, on admission, timmy X, if applicable and completed (first 3 are required if patient doesn't refuse):            (X )  Recognizing danger situations (included triggers and roadblocks)                    (X )  Coping skills (new ways to manage stress, exercise, relaxation techniques, changing routine, distraction)                                                           (X )  Basic information about quitting (benefits of quitting, techniques in how to quit, available resources  ( ) Referral for counseling faxed to [facility]                                           ( ) Patient refused counseling  ( ) Patient has not smoked in the last 30 days    Metabolic Screening:    No results found for: LABA1C    Lab Results   Component Value Date    CHOL 136 05/16/2012    CHOL 141 03/29/2012     Lab Results   Component Value Date    TRIG 70 05/16/2012    TRIG 48 03/29/2012     Lab Results   Component Value Date    HDL 54.0 05/16/2012    HDL 56.0 03/29/2012     No components found for: LDLCAL  No results found for: LABVLDL      There is no height or weight on file to calculate BMI. BP Readings from Last 2 Encounters:   06/10/21 103/63   06/09/21 120/84           Pt admitted with followings belongings:     . Patient oriented to surroundings and program expectations and copy of patient rights given. Received admission packet:  YES. Consents reviewed, signed YES.   Patient verbalize understanding: OF UNIT EXPECTATIONS                Tree Hathaway RN

## 2021-06-11 NOTE — PROGRESS NOTES
Group Therapy Note    Patient attended goals group and stated daily goal as to talk about my feelings. Group Therapy Note    Date: 6/11/2021  Start Time: 9:45  End Time:  10:15  Number of Participants: 10    Type of Group: Psychoeducation    Wellness Binder Information  Module Name: Behavioral Activation  Session Number:  NA    Patient's Goal: To id positive ways to engage in tasks and activities to improve wellness recovery. Notes: Attended group and was able to participate. Status After Intervention:  Unchanged    Participation Level:  Active Listener    Participation Quality: Attentive      Speech:  hesitant      Thought Process/Content: Linear      Affective Functioning: Blunted      Mood: anxious and depressed      Level of consciousness:  Alert      Response to Learning: Progressing to goal      Endings: None Reported    Modes of Intervention: Education      Discipline Responsible: Psychoeducational Specialist      Signature:  ANNIE Boston

## 2021-06-11 NOTE — H&P
Department of Psychiatry  History and Physical - Adult     CHIEF COMPLAINT: Depression with suicidal ideation with plan to hang self    Patient was seen after discussing with the treatment team and reviewing the chart    CIRCUMSTANCES OF ADMISSION:   The pt stated that she initially came in to get her pregnancy checked b/c she got walked on by her dog. She stated that while here she decided to be straight forward with the fact that she has been depressed and suicidal.  She stated that she just moved here 2 weeks ago from [place] b/c she is 13 weeks pregnant and the dad is a drug addict and \"a deadbeat\". She stated that on memorial day she had the plan to OD on pills and jump in the river to never be found. She stated that today she thought that she would hang herself in the garage and that her mom would find her. She was medically cleared in the emergency department, she received an ultrasound which confirmed fetal viability and gestation of 14 weeks and 5 days, toxicology in ED was positive for THC,  she was mated to 9 W. for psychiatric evaluation and stabilization. HISTORY OF PRESENT ILLNESS:      The patient is a 23 y.o. female with significant past history of multiple past inpatient psychiatric admissions at [facility], has a history of depression, anxiety, suicidal ideation, autism and bipolar disorder. Patient presented to [facility] emergency department [facility] for a fetal viability test after she got walked on by her dog. She stated that while here she decided to be straight forward with the fact that she has been depressed and suicidal.  She stated that she just moved here 2 weeks ago from [place] b/c she is 13 weeks pregnant and the dad is a drug addict and \"a deadbeat\". She stated that on memorial day she had the plan to OD on pills and jump in the river to never be found.   She stated that today she thought that she would hang herself in the garage and that her mom would find her. She was medically cleared in the emergency department, she received an ultrasound which confirmed fetal viability and gestation of 14 weeks and 5 days. Upon assessment today the patient is agreeable to assessment and evaluation. She is of labile mood, she cries easily, her mood undulates between tearful and depressed crying to laughing hysterically. She is hyperactive and manic. She is extremely impulsive, and has extremely poor insight and judgment. She denies auditory or visual hallucinations. The patient was screened for STDs in the emergency department and was found to have syphilis, bacterial vaginitis, HPV, genital herpes and was treated for trichomoniasis. It is questionable if this patient is able to protect or provide for her own safety, wellbeing or health. She is extremely impulsive, she is of questionable intellectual function. She is rambling and tangential, and demonstrates no insight into her overall health, mental illness or wellbeing. She goes on to tell me that over the [facility] Day weekend she had plan to overdose and throw herself in the river that we do not find her. Plan this time is to hang herself in the garage stating that \"I hope my mother finds me. \"      Past psychiatric history:  History of psychiatric hospitalizations 2 times in 2020 at [facility] for depression with suicidal ideation. She has a history of bipolar disorder, ADHD and autism. Family psychiatric history:  Patient does not know her family psychiatric history, states no one has committed suicide    Legal history:  Denies current or past legal history. Substance abuse history:   Patient admits to daily marijuana use, however does not see this as substance use. She denies other illicit substance use. Admits to occasional alcohol consumption. Personal family social history  Patient states that she grew up in the area, was raised by her mother.  She states she palpable   venus distention   adenopathy   Chest: x Clear   Rhonchi     Wheezing   CV:  xS1   xS2    xNo murmer   Abdomen:  x  Soft    Tender    Viceromegaly   Extremities:  x No Edema     Edema     Cranial Nerves Examination:   CN II:   xPupils are reactive to light  Pupils are non reactive to light  CN III, IV, VI:  xNo eye deviation    No diplopia or ptosis   CN V:    xFacial Sensation is intact     Facial Sensation is not intact   CN IIIV:   x Hearing is normal to rubbing fingers   CN IX, X:     xNormal gag reflex and phonation   CN XI:   xShoulder shrug and neck rotation is normal  CNXII:    xTongue is midline no deviation or atrophy    Mental Status Examination:    Mental status examination reveals a 68-year-old  female with average hygiene average grooming who appears younger than stated age is superficially forthcoming and cooperative for assessment. Psychomotor reveals no agitation retardation involuntary movement or abnormal posture. Speech is somewhat impoverished, rapid pressured and loud, however she is able to answer questions with relevance and there is no delayed or long latency of response. Mood is \"okay. \"  Affect is hyperactive and labile and congruent with stated mood. Thought process is with looseness of association and flight of ideas. Thought content is devoid of auditory or visual hallucinations or no overt or covert signs of psychosis or paranoia however she does continue to endorse suicidal ideation, with plan to hang herself. Her behavior is hyperactive and erratic. Memory is intact for conversation. Cognitive function appears to be around her baseline, given her autism, ADHD and bipolar diagnoses. Insight judgment impulse control are extremely poor. She is alert and oriented to person place time and situation, and able to recount events leading to her hospitalization.     DIAGNOSIS:  Bipolar disorder, curr episode mixed, severe, w/o psychotic features [facility])  ADHD  Autism    LABS: REVIEWED TODAY:  Recent Labs     06/09/21 2208   WBC 13.8*   HGB 11.5        Recent Labs     06/09/21 2208      K 3.3*   CL 99   CO2 25   BUN 6   CREATININE 0.4*   GLUCOSE 113*     No results for input(s): BILITOT, ALKPHOS, AST, ALT in the last 72 hours. Lab Results   Component Value Date    LABAMPH NOT DETECTED 06/10/2021    LABAMPH POSITIVE 05/09/2012    BARBSCNU NOT DETECTED 06/10/2021    LABBENZ NOT DETECTED 06/10/2021    LABBENZ NOT DETECTED 05/09/2012    CANNAB NOT DETECTED  03/13/2013    COCAINESCRN NOT DETECTED 03/13/2013    LABMETH NOT DETECTED 06/10/2021    OPIATESCREENURINE NOT DETECTED 06/10/2021    PHENCYCLIDINESCREENURINE NOT DETECTED 06/10/2021    PPXUR NOT DETECTED 03/13/2013    ETOH <10 06/10/2021     Lab Results   Component Value Date    TSH 1.690 07/01/2020     No results found for: LITHIUM  No results found for: VALPROATE, CBMZ  No results found for: LITHIUM, VALPROATE      Radiology US OB LESS THAN 14 WEEKS SINGLE OR FIRST GESTATION    Result Date: 6/10/2021  EXAMINATION: TRANSABDOMINAL FIRST TRIMESTER OBSTETRIC PELVIC ULTRASOUND WITH COLOR DOPPLER FLOW 6/10/2021 TECHNIQUE: TRANSABDOMINAL PELVIC ULTRASOUND WITH COLOR DOPPLER FLOW COMPARISON: None HISTORY: ORDERING SYSTEM PROVIDED HISTORY: pain TECHNOLOGIST PROVIDED HISTORY: Reason for exam:->pain What reading provider will be dictating this exam?->CRC FINDINGS: There is a single living intrauterine gestation in cephalic presentation. . Cervical os is closed measuring 3.2 cm. The placenta is posterior and free of cervical os. There is a normal volume of amniotic fluid. Biometric data corresponds to gestational age of 12 weeks and 6 days plus/minus 1-2 weeks. No fetal anomalies are identified.   Cardiac activity is noted with a rate of 146 BPM.     Single living IUP of 14 weeks and 6 days plus/minus 1-2 weeks         TREATMENT PLAN:  The patient's diagnosis, treatment plan, medication management were formulated after patient was seen directly by the attending physician and myself and all relevant documentation was reviewed. Risk Management: Based on the diagnosis and assessment biopsychosocial treatment model was presented to the patient and was given the opportunity to ask any question. The patient was agreeable to the plan and all the patient's questions were answered to the patient's satisfaction. I discussed with the patient the risk, benefit, alternative and common side effects for the proposed medication treatment. The patient is consenting to this treatment. Collateral Information:  Will obtain collateral information from the family or friends. Will obtain medical records as appropriate from out patient providers  Will consult the hospitalist for a physical exam to rule out any co-morbid physical condition. Home medication Reconciled       New Medications started during this admission :    The patient has been extensively counseled on the use of psychotropic medications during pregnancy. And she states that she does not wish to continue  psychotropic medications at this time due to pregnancy. Patient states that she does not want to do anything that might harm her baby. Due to her psychiatric history, her impulsive behaviors the psychiatric team will recommend that the patient seek outpatient treatment for psychiatric conditions after 16 weeks of gestation to minimize risk of affect to the fetus during organogenesis. .    The concerns of this psychiatric team have been extensively discussed with the patient including the risks and benefits of treatment. She has the capacity to understand education provided and wishes to continue with treatment even with discussion of risk of use of psychotropic medications during pregnancy. She was able to verbalize understanding of education and counseling provided. Prn Haldol 5mg and Vistaril 50mg q6hr for extreme agitation.   Trazodone as ordered for insomnia  Vistaril as ordered for anxiety      Psychotherapy:   Encourage participation in milieu and group therapy  Individual therapy as needed        NOTE: This report was transcribed using voice recognition software. Every effort was made to ensure accuracy; however, inadvertent computerized transcription errors may be present. Behavioral Services  Medicare Certification Upon Admission    I certify that this patient's inpatient psychiatric hospital admission is medically necessary for:    [x] (1) Treatment which could reasonably be expected to improve this patient's condition,       [x] (2) Or for diagnostic study;     AND     [x](2) The inpatient psychiatric services are provided while the individual is under the care of a physician and are included in the individualized plan of care.     Estimated length of stay/service 3 to 5 days based on stability    Plan for post-hospital care follow-up with outpatient provider    Electronically signed by MONICA Grijalva CNP on 6/11/2021 at 8:35 AM

## 2021-06-12 PROCEDURE — 99232 SBSQ HOSP IP/OBS MODERATE 35: CPT | Performed by: NURSE PRACTITIONER

## 2021-06-12 PROCEDURE — 6370000000 HC RX 637 (ALT 250 FOR IP): Performed by: NURSE PRACTITIONER

## 2021-06-12 PROCEDURE — 6370000000 HC RX 637 (ALT 250 FOR IP): Performed by: PSYCHIATRY & NEUROLOGY

## 2021-06-12 PROCEDURE — 6370000000 HC RX 637 (ALT 250 FOR IP): Performed by: INTERNAL MEDICINE

## 2021-06-12 PROCEDURE — 6370000000 HC RX 637 (ALT 250 FOR IP): Performed by: FAMILY MEDICINE

## 2021-06-12 PROCEDURE — 1240000000 HC EMOTIONAL WELLNESS R&B

## 2021-06-12 RX ORDER — POLYETHYLENE GLYCOL 3350 17 G/17G
17 POWDER, FOR SOLUTION ORAL DAILY
Status: DISCONTINUED | OUTPATIENT
Start: 2021-06-12 | End: 2021-06-16 | Stop reason: HOSPADM

## 2021-06-12 RX ADMIN — ACYCLOVIR 800 MG: 200 CAPSULE ORAL at 21:16

## 2021-06-12 RX ADMIN — CEFDINIR 300 MG: 300 CAPSULE ORAL at 21:16

## 2021-06-12 RX ADMIN — ACYCLOVIR 800 MG: 200 CAPSULE ORAL at 08:51

## 2021-06-12 RX ADMIN — METFORMIN HYDROCHLORIDE 1 TABLET: 500 TABLET, EXTENDED RELEASE ORAL at 08:51

## 2021-06-12 RX ADMIN — POLYETHYLENE GLYCOL 3350 17 G: 17 POWDER, FOR SOLUTION ORAL at 14:09

## 2021-06-12 RX ADMIN — ACYCLOVIR 800 MG: 200 CAPSULE ORAL at 14:07

## 2021-06-12 RX ADMIN — CEFDINIR 300 MG: 300 CAPSULE ORAL at 08:51

## 2021-06-12 ASSESSMENT — PAIN SCALES - GENERAL
PAINLEVEL_OUTOF10: 0
PAINLEVEL_OUTOF10: 0
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
## 2021-06-12 NOTE — PROGRESS NOTES
Pt attended and participated in leisure group of [place] [id number]. Pt was 1 out of 3 in attendance.

## 2021-06-12 NOTE — PROGRESS NOTES
BEHAVIORAL HEALTH FOLLOW-UP NOTE     6/12/2021     Patient was seen and examined in person, Chart reviewed   Patient's case discussed with staff/team    Chief Complaint: \" I am feeling better\"    Interim History: Patient is up on the unit bright pleasant social.  She is smiling denies any depression or anxiety. States that she is taking medication for her infections and that she starting to feel better. She denies SI/HI intent or plan she denies auditory visualization she is eating well sleeping well there are no neurovegetative signs symptoms of depression      Appetite:   [x] Normal/Unchanged  [] Increased  [] Decreased      Sleep:       [x] Normal/Unchanged  [] Fair       [] Poor              Energy:    [x] Normal/Unchanged  [] Increased  [] Decreased        SI [] Present  [x] Absent    HI  []Present  [x] Absent     Aggression:  [] yes  [x] no    Patient is [x] able  [] unable to CONTRACT FOR SAFETY     PAST MEDICAL/PSYCHIATRIC HISTORY:   Past Medical History:   Diagnosis Date    ADHD     Autism     B[facility] 1 disorder ([facility] [address]     PTSD (post-traumatic stress disorder)        FAMILY/SOCIAL HISTORY:  No family history on file.   Social History     Socioeconomic History    Marital status:      Spouse name: Not on file    Number of children: Not on file    Years of education: Not on file    Highest education level: Not on file   Occupational History    Not on file   Tobacco Use    Smoking status: Current Every Day Smoker     Packs/day: 0.50     Types: Cigarettes    Smokeless tobacco: Never Used   Vaping Use    Vaping Use: Never used   Substance and Sexual Activity    Alcohol use: No    Drug use: Yes     Types: Marijuana    Sexual activity: Not on file   Other Topics Concern    Not on file   Social History Narrative    Not on file     Social Determinants of Health     Financial Resource Strain:     Difficulty of Paying Living Expenses:    Food Insecurity:     Worried About Running Out of [employer] in the Last Year:    [address] in the Last Year:    Transportation Needs:     Lack of Transportation (Medical):  Lack of Transportation (Non-Medical):    Physical Activity:     Days of Exercise per Week:     Minutes of Exercise per Session:    Stress:     Feeling of Stress :    Social Connections:     Frequency of Communication with Friends and Family:     Frequency of Social Gatherings with Friends and Family:     Attends L[employer] Services:     Active Member of Clubs or Organizations:     Attends Club or Organization Meetings:     Marital Status:    Intimate Partner Violence:     Fear of Current or Ex-Partner:     Emotionally Abused:     Physically Abused:     Sexually Abused:            ROS:  [x] All negative/unchanged except if checked.  Explain positive(checked items) below:  [] Constitutional  [] Eyes  [] Ear/Nose/Mouth/Throat  [] Respiratory  [] CV  [] GI  []   [] Musculoskeletal  [] Skin/Breast  [] Neurological  [] Endocrine  [] Heme/Lymph  [] Allergic/Immunologic    Explanation:     MEDICATIONS:    Current Facility-Administered Medications:     [START ON 6/16/2021] penicillin G benzathine (BICILLIN L-A) [id number] UNIT/2ML suspension 2.4 Million Units, 2.4 Million Units, Intramuscular, Once, Inés D MONICA Wolff NP    acyclovir (ZOVIRAX) capsule 800 mg, 800 mg, Oral, TID, Jono Mcclure MD, 800 mg at 06/12/21 0851    acetaminophen (TYLENOL) tablet 500 mg, 500 mg, Oral, Q6H PRN, Jono Mcclure MD    cefdinir (OMNICEF) capsule 300 mg, 300 mg, Oral, BID, MONICA Teixeira NP, 300 mg at 06/12/21 0851    lidocaine (XYLOCAINE) 2 % jelly, , Topical, PRN, MONICA Teixeira NP    prenatal vitamin 27-1 MG tablet 1 tablet, 1 tablet, Oral, Daily, Cheo Nguyen MD, 1 tablet at 06/12/21 0851      Examination:  BP (!) 112/55   Pulse 91   Temp 98.9 °F (37.2 °C) (Temporal)   Resp 16   Ht 5' 3\" (1.6 m)   Wt 169 lb (76.7 kg)   LMP 03/01/2021 (Exact Date)   SpO2 95% BMI 29.94 kg/m²   Gait - steady  Medication side effects(SE): Denies    Mental Status Examination:    Level of consciousness:  within normal limits   Appearance:  fair grooming and fair hygiene  Behavior/Motor:  no abnormalities noted  Attitude toward examiner:  cooperative  Speech:  spontaneous, normal rate and normal volume   Mood: \" I am good. \"  Affect: Appropriate and pleasant  Thought processes: Linear without flight of ideas loose associations  Thought content: Devoid of any auditory visual hallucinations delusions or perceptual abnormalities. Denies SI/HI intent or plan  Cognition:  oriented to person, place, and time   Concentration intact  Insight improving  Judgement improving    ASSESSMENT:   Patient symptoms are:  [] Well controlled  [x] Improving  [] Worsening  [] No change      Diagnosis:   Principal Problem:    Bipolar disorder, curr episode mixed, severe, w/o psychotic features ([facility] [address]  Active Problems:    Depression with suicidal ideation  Resolved Problems:    * No resolved hospital problems. *      LABS:    Recent Labs     06/09/21  2208   WBC 13.8*   HGB 11.5        Recent Labs     06/09/21  2208      K 3.3*   CL 99   CO2 25   BUN 6   CREATININE 0.4*   GLUCOSE 113*     No results for input(s): BILITOT, ALKPHOS, AST, ALT in the last 72 hours.   Lab Results   Component Value Date    LABAMPH NOT DETECTED 06/10/2021    LABAMPH POSITIVE 05/09/2012    BARBSCNU NOT DETECTED 06/10/2021    LABBENZ NOT DETECTED 06/10/2021    LABBENZ NOT DETECTED 05/09/2012    CANNAB NOT DETECTED  03/13/2013    COCAINESCRN NOT DETECTED 03/13/2013    LABMETH NOT DETECTED 06/10/2021    OPIATESCREENURINE NOT DETECTED 06/10/2021    PHENCYCLIDINESCREENURINE NOT DETECTED 06/10/2021    PPXUR NOT DETECTED 03/13/2013    ETOH <10 06/10/2021     Lab Results   Component Value Date    TSH 1.690 07/01/2020     No results found for: LITHIUM  No results found for: VALPROATE, CBMZ        Treatment Plan:  Reviewed current Medications with the patient. Risks, benefits, side effects, drug-to-drug interactions and alternatives to treatment were discussed. Collateral information:   CD evaluation  Encourage patient to attend group and other milieu activities. Discharge planning discussed with the patient and treatment team.    We will not order any psychiatric medication at this time due to the pregnancy.     PSYCHOTHERAPY/COUNSELING:  [x] Therapeutic interview  [x] Supportive  [] CBT  [] Ongoing  [] Other    [x] Patient continues to need, on a daily basis, active treatment furnished directly by or requiring the supervision of inpatient psychiatric personnel      Anticipated Length of stay: 3 to 7 days based on stability            Electronically signed by MONICA Galicia CNP on 1/42/8302 at 11:54 AM

## 2021-06-12 NOTE — PROGRESS NOTES
Hospitalist Progress Note      Chart reviewed. Awaiting ID c/s. Benzathine Penicillin G ordered for 6/16, she will need to follow up with her OBGYN for this if discharged before then. Non-billable rounding. Thanks for allowing us to participate in the care of Guzman Holbrook. We will continue to follow along peripherally.  Please do not hesitate to contact us if needed.  +++++++++++++++++++++++++++++++++++++++++++++++++  DENICE Zhang/ José Chandra , [place]

## 2021-06-13 PROCEDURE — 86592 SYPHILIS TEST NON-TREP QUAL: CPT

## 2021-06-13 PROCEDURE — 80074 ACUTE HEPATITIS PANEL: CPT

## 2021-06-13 PROCEDURE — 99231 SBSQ HOSP IP/OBS SF/LOW 25: CPT | Performed by: NURSE PRACTITIONER

## 2021-06-13 PROCEDURE — 36415 COLL VENOUS BLD VENIPUNCTURE: CPT

## 2021-06-13 PROCEDURE — 86703 HIV-1/HIV-2 1 RESULT ANTBDY: CPT

## 2021-06-13 PROCEDURE — 1240000000 HC EMOTIONAL WELLNESS R&B

## 2021-06-13 PROCEDURE — 6370000000 HC RX 637 (ALT 250 FOR IP): Performed by: NURSE PRACTITIONER

## 2021-06-13 PROCEDURE — 6370000000 HC RX 637 (ALT 250 FOR IP): Performed by: PSYCHIATRY & NEUROLOGY

## 2021-06-13 PROCEDURE — 6370000000 HC RX 637 (ALT 250 FOR IP): Performed by: INTERNAL MEDICINE

## 2021-06-13 PROCEDURE — 6370000000 HC RX 637 (ALT 250 FOR IP): Performed by: FAMILY MEDICINE

## 2021-06-13 RX ORDER — METRONIDAZOLE 500 MG/1
250 TABLET ORAL EVERY 12 HOURS SCHEDULED
Status: DISCONTINUED | OUTPATIENT
Start: 2021-06-13 | End: 2021-06-16 | Stop reason: HOSPADM

## 2021-06-13 RX ADMIN — METRONIDAZOLE 250 MG: 500 TABLET ORAL at 21:16

## 2021-06-13 RX ADMIN — ACYCLOVIR 800 MG: 200 CAPSULE ORAL at 21:16

## 2021-06-13 RX ADMIN — POLYETHYLENE GLYCOL 3350 17 G: 17 POWDER, FOR SOLUTION ORAL at 09:45

## 2021-06-13 RX ADMIN — CEFDINIR 300 MG: 300 CAPSULE ORAL at 09:45

## 2021-06-13 RX ADMIN — ACYCLOVIR 800 MG: 200 CAPSULE ORAL at 09:45

## 2021-06-13 RX ADMIN — METFORMIN HYDROCHLORIDE 1 TABLET: 500 TABLET, EXTENDED RELEASE ORAL at 09:44

## 2021-06-13 RX ADMIN — ACYCLOVIR 800 MG: 200 CAPSULE ORAL at 13:45

## 2021-06-13 ASSESSMENT — PAIN SCALES - GENERAL
PAINLEVEL_OUTOF10: 0
PAINLEVEL_OUTOF10: 0

## 2021-06-13 NOTE — PLAN OF CARE
Problem: Depressive Behavior With or Without Suicide Precautions:  Goal: Able to verbalize acceptance of life and situations over which he or she has no control  Description: Able to verbalize acceptance of life and situations over which he or she has no control  Outcome: Ongoing  Goal: Able to verbalize and/or display a decrease in depressive symptoms  Description: Able to verbalize and/or display a decrease in depressive symptoms  Outcome: Ongoing   Pt denies si/hi and hallucinations. Pt reports she had a bad night and did not sleep well because of racing thoughts. Pt out on the unit and is social with peers. Pt has poor insight into her mental health or pregnancy. Pt c/o anxiety and depression 7/10. Pt takes med's and attends groups.

## 2021-06-13 NOTE — PROGRESS NOTES
No behaviors observed during night shift, no prn's administered at this time. No signs/symptoms of distress or discomfort noted. Patient in [employer] with peers. Will continue to monitor and support.  Q 15 minute observation checks maintained

## 2021-06-13 NOTE — CONSULTS
Department of Internal Medicine  Infectious Diseases   Consult Note      Reason for Consult:  Syphilis , BV       Requesting Physician:  Dr John Paul Richard:                The patient is a 23 y.o. female  G1, P0  13 th week of pregnancy , hx  of ADHD, bipolar disorder presented to the ER after her dog stepped on her stomach -she admitted to suicidal ideation and depression . She was admitted to the hospital .  She reported vaginal itching , and drainage .  She denied fever and chills   WBC was 13 K,   Urine wet prep  showed clue cells   She was given omnicef, flagyl and     Past Medical History:      Past Medical History:   Diagnosis Date    ADHD     Autism     Bipolar 1 disorder (HCC)     PTSD (post-traumatic stress disorder)          Past Surgical History:      None     Current Medications:      Current Facility-Administered Medications   Medication Dose Route Frequency Provider Last Rate Last Admin    [START ON 6/16/2021] penicillin G benzathine (BICILLIN L-A) [id number] UNIT/2ML suspension 2.4 Million Units  2.4 Million Units Intramuscular Once MONICA Kearns - KALI        polyethylene glycol (GLYCOLAX) packet 17 g  17 g Oral Daily Cristopher Mejias MD   17 g at 06/13/21 0945    acyclovir (ZOVIRAX) capsule 800 mg  800 mg Oral TID Edwina Blanchard MD   800 mg at 06/13/21 0945    acetaminophen (TYLENOL) tablet 500 mg  500 mg Oral Q6H PRN Edwina Blanchard MD        cefdinir (OMNICEF) capsule 300 mg  300 mg Oral BID MONICA Kearns NP   300 mg at 06/13/21 0945    lidocaine (XYLOCAINE) 2 % jelly   Topical PRN Inés Darlene MittsMONICA - NP        prenatal vitamin 27-1 MG tablet 1 tablet  1 tablet Oral Daily Mark Hutchins MD   1 tablet at 06/13/21 0944       Allergies:  Shellfish-derived products and Fish-derived products    Social History:      Social History     Socioeconomic History    Marital status:      Spouse name: Not on file    Number of children: Not on file  Years of education: Not on file    Highest education level: Not on file   Occupational History    Not on file   Tobacco Use    Smoking status: Current Every Day Smoker     Packs/day: 0.50     Types: Cigarettes    Smokeless tobacco: Never Used   Vaping Use    Vaping Use: Never used   Substance and Sexual Activity    Alcohol use: No    Drug use: Yes     Types: Marijuana    Sexual activity: Not on file   Other Topics Concern    Not on file   Social History Narrative    Not on file     Social Determinants of Health     Financial Resource Strain:     Difficulty of Paying Living Expenses:    Food Insecurity:     Worried About Running Out of Food in the Last Year:     Ran Out of Food in the Last Year:    Transportation Needs:     Lack of Transportation (Medical):  Lack of Transportation (Non-Medical):    Physical Activity:     Days of Exercise per Week:     Minutes of Exercise per Session:    Stress:     Feeling of Stress :    Social Connections:     Frequency of Communication with Friends and Family:     Frequency of Social Gatherings with Friends and Family:     Attends [employer] Services:     Active Member of Clubs or Organizations:     Attends Club or Organization Meetings:     Marital Status:    Intimate Partner Violence:     Fear of Current or Ex-Partner:     Emotionally Abused:     Physically Abused:     Sexually Abused:        Family History:     Not pertinent to present illness    REVIEW OF SYSTEMS:    CONSTITUTIONAL:  Denies fever, chill or rigors, nausea or vomiting. HEENT: denies blurring of vision or double vision, denies hearing problem  RESPIRATORY: denies cough, shortness of breath, sputum expectoration, chest pain. CARDIOVASCULAR:  Denies palpitation  GASTROINTESTINAL:  Denies abdomen pain, diarrhea or constipation.   GENITOURINARY:  Vaginal drainage, itching   INTEGUMENT: denies wound , rash  HEMATOLOGIC/LYMPHATIC:  Denies lymph node swelling, gum bleeding or easy bruising. MUSCULOSKELETAL:  Denies leg pain , joint pain , joint swelling  NEUROLOGICAL:  Denies light headed, dizziness    PHYSICAL EXAM:      Vitals:     Vitals:    06/13/21 0745   BP: (!) 107/54   Pulse: 74   Resp: 16   Temp: 97.7 °F (36.5 °C)   SpO2: 96%       General Appearance:    Awake, alert , no acute distress. Head:    Normocephalic, atraumatic   Eyes:    No pallor, no icterus,   Ears:    No obvious deformity or drainage.    Nose:   No nasal drainage   Throat:   Mucosa moist, no oral thrush   Neck:   Supple, no lymphadenopathy   Back:     no CVA tenderness   Lungs:     Clear to auscultation bilaterally, no wheeze    Heart:    Regular rate and rhythm, no murmur   Abdomen:     Soft, non-tender, bowel sounds present    Extremities:   No edema, no cyanosis    Pulses:   Dorsalis pedis palpable    Skin:   no rashes or lesions     CBC with Differential:      Lab Results   Component Value Date    WBC 13.8 06/09/2021    RBC 3.86 06/09/2021    HGB 11.5 06/09/2021    HCT 32.2 06/09/2021     06/09/2021    MCV 83.4 06/09/2021    MCH 29.8 06/09/2021    MCHC 35.7 06/09/2021    RDW 13.7 06/09/2021    SEGSPCT 63 05/09/2012    LYMPHOPCT 15.3 06/09/2021    MONOPCT 5.4 06/09/2021    MYELOPCT 0.9 08/13/2020    BASOPCT 0.3 06/09/2021    MONOSABS 0.74 06/09/2021    LYMPHSABS 2.11 06/09/2021    EOSABS 0.53 06/09/2021    BASOSABS 0.04 06/09/2021       CMP     Lab Results   Component Value Date     06/09/2021    K 3.3 06/09/2021    K 3.9 08/12/2020    CL 99 06/09/2021    CO2 25 06/09/2021    BUN 6 06/09/2021    CREATININE 0.4 06/09/2021    GFRAA >60 06/09/2021    LABGLOM >60 06/09/2021    GLUCOSE 113 06/09/2021    GLUCOSE 71 05/09/2012    PROT 6.8 08/12/2020    LABALBU 3.9 08/12/2020    LABALBU 4.5 05/09/2012    CALCIUM 9.3 06/09/2021    BILITOT 0.3 08/12/2020    ALKPHOS 63 08/12/2020    AST 14 08/12/2020    ALT 8 08/12/2020         Hepatic Function Panel:    Lab Results   Component Value Date    ALKPHOS 63

## 2021-06-13 NOTE — GROUP NOTE
Group Therapy Note    Date: 6/13/2021    Group Start Time: 1100  Group End Time: 1130  Group Topic: Psychoeducation    [facility]    Dee Gaines, CTRS        Group Therapy Note      Number of participants: 10  Type of group: Psychoeducation  Mode of intervention: Education, Support, Socialization, Exploration, Clarifying, and Problem-solving  Topic: Maintenance Plan   Objective: Pt will develop and share 1 way to implement maintenance plan in recovery. Notes:  Pt interactive during group developing and sharing 1 way to implement maintenance plan in recovery. Pt gave support and feedback to others. Status After Intervention:  Improved    Participation Level:  Active Listener and Interactive    Participation Quality: Appropriate, Attentive, Sharing and Supportive      Speech:  normal      Thought Process/Content: Logical      Affective Functioning: Congruent      Mood: euthymic      Level of consciousness:  Alert, Oriented x4 and Attentive      Response to Learning: Able to verbalize current knowledge/experience, Able to verbalize/acknowledge new learning, Able to retain information, Capable of insight, Able to change behavior and Progressing to goal      Endings: None Reported    Modes of Intervention: Education, Support, Socialization, Exploration, Clarifying and Problem-solving

## 2021-06-13 NOTE — PROGRESS NOTES
Patient A+Ox 4, denies SI, HI, and hallucinations. Rates both anxiety and depression 7/10. Patient os brightened, and observed out on the unit laughing and socializing with peers. Patient is compliant with medication regimen.  No behaviors observed

## 2021-06-13 NOTE — PROGRESS NOTES
BEHAVIORAL HEALTH FOLLOW-UP NOTE     6/13/2021     Patient was seen and examined in person, Chart reviewed   Patient's case discussed with staff/team    Chief Complaint: \" I am having a bad day \"    Interim History: Patient up on the unit states she feels anxious and is having a bad day. Her affect is very irritable she is easily agitated she reportedly threatened to throw a chair at another patient. She is impulsive with poor insight and judgment. Appetite:   [x] Normal/Unchanged  [] Increased  [] Decreased      Sleep:       [x] Normal/Unchanged  [] Fair       [] Poor              Energy:    [x] Normal/Unchanged  [] Increased  [] Decreased        SI [] Present  [x] Absent    HI  []Present  [x] Absent     Aggression:  [] yes  [x] no    Patient is [x] able  [] unable to CONTRACT FOR SAFETY     PAST MEDICAL/PSYCHIATRIC HISTORY:   Past Medical History:   Diagnosis Date    ADHD     Autism     Bipolar 1 disorder ([facility] [address]     PTSD (post-traumatic stress disorder)        FAMILY/SOCIAL HISTORY:  No family history on file.   Social History     Socioeconomic History    Marital status:      Spouse name: Not on file    Number of children: Not on file    Years of education: Not on file    Highest education level: Not on file   Occupational History    Not on file   Tobacco Use    Smoking status: Current Every Day Smoker     Packs/day: 0.50     Types: Cigarettes    Smokeless tobacco: Never Used   Vaping Use    Vaping Use: Never used   Substance and Sexual Activity    Alcohol use: No    Drug use: Yes     Types: Marijuana    Sexual activity: Not on file   Other Topics Concern    Not on file   Social History Narrative    Not on file     Social Determinants of Health     Financial Resource Strain:     Difficulty of Paying Living Expenses:    Food Insecurity:     Worried About Running Out of Food in the Last Year:     [address] in the Last Year:    Transportation Needs:     Lack of Transportation (Medical):  Lack of Transportation (Non-Medical):    Physical Activity:     Days of Exercise per Week:     Minutes of Exercise per Session:    Stress:     Feeling of Stress :    Social Connections:     Frequency of Communication with Friends and Family:     Frequency of Social Gatherings with Friends and Family:     Attends [employer]ars[employer] Services:     Active Member of Clubs or Organizations:     Attends Club or Organization Meetings:     Marital Status:    Intimate Partner Violence:     Fear of Current or Ex-Partner:     Emotionally Abused:     Physically Abused:     Sexually Abused:            ROS:  [x] All negative/unchanged except if checked.  Explain positive(checked items) below:  [] Constitutional  [] Eyes  [] Ear/Nose/Mouth/Throat  [] Respiratory  [] CV  [] GI  []   [] Musculoskeletal  [] Skin/Breast  [] Neurological  [] Endocrine  [] Heme/Lymph  [] Allergic/Immunologic    Explanation:     MEDICATIONS:    Current Facility-Administered Medications:     [START ON 6/16/2021] penicillin G benzathine (BICILLIN L-A) [id number] UNIT/2ML suspension 2.4 Million Units, 2.4 Million Units, Intramuscular, Once, MONICA Benoit - NP    polyethylene glycol (GLYCOLAX) packet 17 g, 17 g, Oral, Daily, Ramon Devlin MD, 17 g at 06/13/21 0945    acyclovir (ZOVIRAX) capsule 800 mg, 800 mg, Oral, TID, Osiris Flores MD, 800 mg at 06/13/21 0945    acetaminophen (TYLENOL) tablet 500 mg, 500 mg, Oral, Q6H PRN, Osiris Flores MD    cefdinir (OMNICEF) capsule 300 mg, 300 mg, Oral, BID, MONICA Choi - NP, 300 mg at 06/13/21 0945    lidocaine (XYLOCAINE) 2 % jelly, , Topical, PRN, MONICA Choi - NP    prenatal vitamin 27-1 MG tablet 1 tablet, 1 tablet, Oral, Daily, Tatiana Gallego MD, 1 tablet at 06/13/21 0944      Examination:  BP (!) 107/54   Pulse 74   Temp 97.7 °F (36.5 °C) (Temporal)   Resp 16   Ht 5' 3\" (1.6 m)   Wt 169 lb (76.7 kg)   LMP 03/01/2021 (Exact Date)   SpO2 96% BMI 29.94 kg/m²   Gait - steady  Medication side effects(SE): Denies    Mental Status Examination:    Level of consciousness:  within normal limits   Appearance:  fair grooming and fair hygiene  Behavior/Motor:  no abnormalities noted  Attitude toward examiner:  cooperative  Speech:  spontaneous, normal rate and normal volume   Mood: \" I am good. \"  Affect: Appropriate and pleasant  Thought processes: Linear without flight of ideas loose associations  Thought content: Devoid of any auditory visual hallucinations delusions or perceptual abnormalities. Denies SI/HI intent or plan  Cognition:  oriented to person, place, and time   Concentration intact  Insight improving  Judgement improving    ASSESSMENT:   Patient symptoms are:  [] Well controlled  [x] Improving  [] Worsening  [] No change      Diagnosis:   Principal Problem:    Bipolar disorder, curr episode mixed, severe, w/o psychotic features ([address]  Active Problems:    Depression with suicidal ideation  Resolved Problems:    * No resolved hospital problems. *      LABS:    No results for input(s): WBC, HGB, PLT in the last 72 hours. No results for input(s): NA, K, CL, CO2, BUN, CREATININE, GLUCOSE in the last 72 hours. No results for input(s): BILITOT, ALKPHOS, AST, ALT in the last 72 hours. Lab Results   Component Value Date    LABAMPH NOT DETECTED 06/10/2021    LABAMPH POSITIVE 05/09/2012    BARBSCNU NOT DETECTED 06/10/2021    LABBENZ NOT DETECTED 06/10/2021    LABBENZ NOT DETECTED 05/09/2012    CANNAB NOT DETECTED  03/13/2013    COCAINESCRN NOT DETECTED 03/13/2013    LABMETH NOT DETECTED 06/10/2021    OPIATESCREENURINE NOT DETECTED 06/10/2021    PHENCYCLIDINESCREENURINE NOT DETECTED 06/10/2021    PPXUR NOT DETECTED 03/13/2013    ETOH <10 06/10/2021     Lab Results   Component Value Date    TSH 1.690 07/01/2020     No results found for: LITHIUM  No results found for: VALPROATE, CBMZ        Treatment Plan:  Reviewed current Medications with the patient.

## 2021-06-13 NOTE — PLAN OF CARE
Problem: Depressive Behavior With or Without Suicide Precautions:  Goal: Able to verbalize acceptance of life and situations over which he or she has no control  Description: Able to verbalize acceptance of life and situations over which he or she has no control  6/12/2021 2228 by Oscar Ontiveros RN  Outcome: Ongoing  6/12/2021 1554 by Khari Cerrato RN  Outcome: Ongoing     Problem: Depressive Behavior With or Without Suicide Precautions:  Goal: Able to verbalize and/or display a decrease in depressive symptoms  Description: Able to verbalize and/or display a decrease in depressive symptoms  6/12/2021 2228 by Oscar Ontiveros RN  Outcome: Ongoing  6/12/2021 1554 by Khari Cerrato RN  Outcome: Ongoing

## 2021-06-13 NOTE — BH NOTE
Pt disruptive in group AEB yelling at another patient \"I'm going to F-you up! \" pt taken to her room by staff to decrease stimulation. Pt reports she was fighting with another patient because she sat in her chair. Pt asked to stay in her room and remain in control because she can not fight other patients. Pt verbalized understanding at this time.

## 2021-06-13 NOTE — BH NOTE
[facility]  Day 3 Interdisciplinary Treatment Plan NOTE    Review Date & Time: 06/13/2021 1200     Patient was in treatment team    Admission Type:   Admission Type: Involuntary    Reason for admission:  Reason for Admission: SUICIDAL DEPRESSION  Estimated Length of Stay Update:  5-7 days   Estimated Discharge Date Update: 06/18/2021    PATIENT STRENGTHS:  Patient Strengths Strengths: Positive Support, Social Skills, No significant Physical Illness  Patient Strengths and Limitations:Limitations: Difficulty problem solving/relies on others to help solve problems  Addictive Behavior:Addictive Behavior  In the past 3 months, have you felt or has someone told you that you have a problem with:  : None  Do you have a history of Chemical Use?: No  Do you have a history of Alcohol Use?: No  Do you have a history of Street Drug Abuse?: No  Histroy of Prescripton Drug Abuse?: No  Medical Problems:  Past Medical History:   Diagnosis Date    ADHD     Autism     Bipolar 1 disorder ([facility] [address]     PTSD (post-traumatic stress disorder)        Risk:  Fall RiskTotal: 67  Alejandro Scale Alejandro Scale Score: 22  BVC Total: 0  Change in scores none.  Changes to plan of Care  none    Status EXAM:   Status and Exam  Normal: No  Facial Expression: Brightened  Affect: Congruent  Level of Consciousness: Alert  Mood:Normal: No  Mood: Depressed, Anxious  Motor Activity:Normal: Yes  Interview Behavior: Cooperative  Preception: McCoy to Person, [place] Gens to Time, McCoy to Place, McCoy to Situation  Attention:Normal: No  Attention: Distractible  Thought Processes: Circumstantial  Thought Content:Normal: No  Thought Content: Preoccupations  Hallucinations: None  Delusions: No  Memory:Normal: Yes  Insight and Judgment: No  Insight and Judgment: Poor Insight, Poor Judgment  Present Suicidal Ideation: No  Present Homicidal Ideation: No    Daily Assessment Last Entry:   Daily Sleep (WDL): Within Defined Limits         Patient Currently in Pain: Denies  Daily Nutrition (WDL): Within Defined Limits    Patient Monitoring:  Frequency of Checks: 4 times per hour, close    Psychiatric Symptoms:   Depression Symptoms  Depression Symptoms: Feelings of helplessness, Feelings of hopelessess, Feelings of worthlessness, Impaired concentration  Anxiety Symptoms  Anxiety Symptoms: Generalized  Andreea Symptoms  Andreea Symptoms: Poor judgment, Pressured speech, Hypersexuality     Psychosis Symptoms  Delusion Type: No problems reported or observed.     Suicide Risk CSSR-S:  1) Within the past month, have you wished you were dead or wished you could go to sleep and not wake up? : Yes  2) Have you actually had any thoughts of killing yourself? : No  6) Have you ever done anything, started to do anything, or prepared to do anything to end your life?: No  Change in Result none Change in Plan of care none      EDUCATION:   Learner Progress Toward Treatment Goals: Reviewed group plan and strategies    Method: Small group    Outcome: Needs reinforcement and No evidence of Learning    PATIENT GOALS: dont flip out    PLAN/TREATMENT RECOMMENDATIONS UPDATE: 06/17/2021    GOALS UPDATE:   Time frame for Short-Term Goals: 1-3 days       Norris Alva RN

## 2021-06-14 LAB
HAV IGM SER IA-ACNC: NORMAL
HEPATITIS B CORE IGM ANTIBODY: NORMAL
HEPATITIS B SURFACE ANTIGEN INTERPRETATION: NORMAL
HEPATITIS C ANTIBODY INTERPRETATION: NORMAL
HIV-1 AND HIV-2 ANTIBODIES: NORMAL
RPR: NORMAL

## 2021-06-14 PROCEDURE — 99231 SBSQ HOSP IP/OBS SF/LOW 25: CPT | Performed by: NURSE PRACTITIONER

## 2021-06-14 PROCEDURE — 6370000000 HC RX 637 (ALT 250 FOR IP): Performed by: INTERNAL MEDICINE

## 2021-06-14 PROCEDURE — 86592 SYPHILIS TEST NON-TREP QUAL: CPT

## 2021-06-14 PROCEDURE — 36415 COLL VENOUS BLD VENIPUNCTURE: CPT

## 2021-06-14 PROCEDURE — 6370000000 HC RX 637 (ALT 250 FOR IP): Performed by: FAMILY MEDICINE

## 2021-06-14 PROCEDURE — 6370000000 HC RX 637 (ALT 250 FOR IP): Performed by: PSYCHIATRY & NEUROLOGY

## 2021-06-14 PROCEDURE — 1240000000 HC EMOTIONAL WELLNESS R&B

## 2021-06-14 RX ADMIN — ACYCLOVIR 800 MG: 200 CAPSULE ORAL at 08:45

## 2021-06-14 RX ADMIN — METRONIDAZOLE 250 MG: 500 TABLET ORAL at 08:45

## 2021-06-14 RX ADMIN — METRONIDAZOLE 250 MG: 500 TABLET ORAL at 21:01

## 2021-06-14 RX ADMIN — POLYETHYLENE GLYCOL 3350 17 G: 17 POWDER, FOR SOLUTION ORAL at 08:44

## 2021-06-14 RX ADMIN — METFORMIN HYDROCHLORIDE 1 TABLET: 500 TABLET, EXTENDED RELEASE ORAL at 08:44

## 2021-06-14 ASSESSMENT — PAIN SCALES - GENERAL
PAINLEVEL_OUTOF10: 0
PAINLEVEL_OUTOF10: 0

## 2021-06-14 NOTE — PROGRESS NOTES
Patient abruptly ended an argument on phone by throwing the phone on the floor. Patient then yelled on unit before retreating to her room and slamming the door shut. Patient jumped in her bed and was tearful, stating, \"I just want to know what it's like to have a mom, I never had a real mom! \" Patient explained that she got upset with her mom on the phone and states \"she always puts me down! \" Emotional support was provided. Discussed reconnecting with positive supports in patient's life. Patient was able to identify her \"Auntie. \" Patient verbalized understanding of behavioral expectations on unit.

## 2021-06-14 NOTE — CARE COORDINATION
Pt is social and bright on the unit per treatment team report. Pt's estimated discharge date is: Wednesday, 6/16. Pt will continue to monitor this pt's mood and behaviors.

## 2021-06-14 NOTE — PROGRESS NOTES
BEHAVIORAL HEALTH FOLLOW-UP NOTE     6/14/2021     Patient was seen and examined in person, Chart reviewed   Patient's case discussed with staff/team    Chief Complaint: \" I am having a bad day \"    Interim History: Patient observed in her room this morning. Initially on encounter she is pleasant and cooperative however begins to demonstrate a very labile mood with impulsivity and poor control of her behaviors. Her affect is very irritable she is easily agitated she threw the phone after conversation with her mother. She is impulsive with poor insight and judgment. Appetite:   [x] Normal/Unchanged  [] Increased  [] Decreased      Sleep:       [x] Normal/Unchanged  [] Fair       [] Poor              Energy:    [x] Normal/Unchanged  [] Increased  [] Decreased        SI [] Present  [x] Absent    HI  []Present  [x] Absent     Aggression:  [] yes  [x] no    Patient is [x] able  [] unable to CONTRACT FOR SAFETY     PAST MEDICAL/PSYCHIATRIC HISTORY:   Past Medical History:   Diagnosis Date    ADHD     Autism     Bipolar 1 disorder ([facility] [address]     PTSD (post-traumatic stress disorder)        FAMILY/SOCIAL HISTORY:  No family history on file.   Social History     Socioeconomic History    Marital status:      Spouse name: Not on file    Number of children: Not on file    Years of education: Not on file    Highest education level: Not on file   Occupational History    Not on file   Tobacco Use    Smoking status: Current Every Day Smoker     Packs/day: 0.50     Types: Cigarettes    Smokeless tobacco: Never Used   Vaping Use    Vaping Use: Never used   Substance and Sexual Activity    Alcohol use: No    Drug use: Yes     Types: Marijuana    Sexual activity: Not on file   Other Topics Concern    Not on file   Social History Narrative    Not on file     Social Determinants of Health     Financial Resource Strain:     Difficulty of Paying Living Expenses:    Food Insecurity:     Worried About Running Out of Food in the Last Year:    [address] in the Last Year:    Transportation Needs:     Lack of Transportation (Medical):  Lack of Transportation (Non-Medical):    Physical Activity:     Days of Exercise per Week:     Minutes of Exercise per Session:    Stress:     Feeling of Stress :    Social Connections:     Frequency of Communication with Friends and Family:     Frequency of Social Gatherings with Friends and Family:     Attends Yarsanism Services:     Active Member of Clubs or Organizations:     Attends Club or Organization Meetings:     Marital Status:    Intimate Partner Violence:     Fear of Current or Ex-Partner:     Emotionally Abused:     Physically Abused:     Sexually Abused:            ROS:  [x] All negative/unchanged except if checked.  Explain positive(checked items) below:  [] Constitutional  [] Eyes  [] Ear/Nose/Mouth/Throat  [] Respiratory  [] CV  [] GI  []   [] Musculoskeletal  [] Skin/Breast  [] Neurological  [] Endocrine  [] Heme/Lymph  [] Allergic/Immunologic    Explanation:     MEDICATIONS:    Current Facility-Administered Medications:     metroNIDAZOLE (FLAGYL) tablet 250 mg, 250 mg, Oral, 2 times per day, Colin العلي MD, 250 mg at 21 0845    [START ON 2021] penicillin G benzathine (BICILLIN L-A) 6646974 UNIT/2ML suspension 2.4 Million Units, 2.4 Million Units, Intramuscular, Once, MONICA Nagel NP    polyethylene glycol (GLYCOLAX) packet 17 g, 17 g, Oral, Daily, Ramon Devlin MD, 17 g at 21 0844    acyclovir (ZOVIRAX) capsule 800 mg, 800 mg, Oral, TID, Bronwyn Apgar, MD, 800 mg at 21 0845    acetaminophen (TYLENOL) tablet 500 mg, 500 mg, Oral, Q6H PRN, Bronwyn Apgar, MD    lidocaine (XYLOCAINE) 2 % jelly, , Topical, PRN, MONICA Nagel NP    prenatal vitamin 27-1 MG tablet 1 tablet, 1 tablet, Oral, Daily, Percilla Phalen, MD, 1 tablet at 21 0844      Examination:  /60   Pulse 91   Temp 97.3 °F (36.3 °C) (Temporal)   Resp 16   Ht 5' 3\" (1.6 m)   Wt 169 lb (76.7 kg)   LMP 03/01/2021 (Exact Date)   SpO2 96%   BMI 29.94 kg/m²   Gait - steady  Medication side effects(SE): Denies    Mental Status Examination:    Level of consciousness:  within normal limits   Appearance:  fair grooming and fair hygiene  Behavior/Motor:  no abnormalities noted  Attitude toward examiner:  cooperative  Speech:  spontaneous, normal rate and normal volume   Mood: \" I am good. \"  Affect: Appropriate and pleasant  Thought processes: Linear without flight of ideas loose associations  Thought content: Devoid of any auditory visual hallucinations delusions or perceptual abnormalities. Denies SI/HI intent or plan  Cognition:  oriented to person, place, and time   Concentration intact  Insight improving  Judgement improving    ASSESSMENT:   Patient symptoms are:  [] Well controlled  [x] Improving  [] Worsening  [] No change      Diagnosis:   Principal Problem:    Bipolar disorder, curr episode mixed, severe, w/o psychotic features ([facility] [address]  Active Problems:    Depression with suicidal ideation  Resolved Problems:    * No resolved hospital problems. *      LABS:    No results for input(s): WBC, HGB, PLT in the last 72 hours. No results for input(s): NA, K, CL, CO2, BUN, CREATININE, GLUCOSE in the last 72 hours. No results for input(s): BILITOT, ALKPHOS, AST, ALT in the last 72 hours.   Lab Results   Component Value Date    LABAMPH NOT DETECTED 06/10/2021    LABAMPH POSITIVE 05/09/2012    BARBSCNU NOT DETECTED 06/10/2021    LABBENZ NOT DETECTED 06/10/2021    LABBENZ NOT DETECTED 05/09/2012    CANNAB NOT DETECTED  03/13/2013    COCAINESCRN NOT DETECTED 03/13/2013    LABMETH NOT DETECTED 06/10/2021    OPIATESCREENURINE NOT DETECTED 06/10/2021    PHENCYCLIDINESCREENURINE NOT DETECTED 06/10/2021    PPXUR NOT DETECTED 03/13/2013    ETOH <10 06/10/2021     Lab Results   Component Value Date    TSH 1.690 07/01/2020     No results found for: LITHIUM  No results found for: VALPROATE, CBMZ        Treatment Plan:  Reviewed current Medications with the patient. Risks, benefits, side effects, drug-to-drug interactions and alternatives to treatment were discussed. Collateral information:   CD evaluation  Encourage patient to attend group and other milieu activities. Discharge planning discussed with the patient and treatment team.    We will not order any psychiatric medication at this time due to the pregnancy.     PSYCHOTHERAPY/COUNSELING:  [x] Therapeutic interview  [x] Supportive  [] CBT  [] Ongoing  [] Other    [x] Patient continues to need, on a daily basis, active treatment furnished directly by or requiring the supervision of inpatient psychiatric personnel      Anticipated Length of stay: 3 to 7 days based on stability            Electronically signed by MONICA Hilton CNP on 6/14/2021 at 9:49 AM

## 2021-06-14 NOTE — PROGRESS NOTES
Group Therapy Note  Patient attended goals group and stated daily goal as to keep a positive attitude. Group Therapy Note    Date: 6/14/2021  Start Time:9:30  End Time: 10:00  Number of Participants: 11    Type of Group: Psychoeducation    Wellness Binder Information  Module Name: Building new habits  Session Number:  NA    Patient's Goal: To id positive ways to build new habits. Notes: Attended group and was able to participate. Status After Intervention:  Improved    Participation Level:  Active Listener and Interactive    Participation Quality: Attentive, Sharing and Intrusive      Speech:  pressured      Thought Process/Content: Linear      Affective Functioning: Flat      Mood: depressed      Level of consciousness:  Alert      Response to Learning: Progressing to goal      Endings: None Reported    Modes of Intervention: Education      Discipline Responsible: Psychoeducational Specialist      Signature:  ANNIE Rodarte

## 2021-06-14 NOTE — PROGRESS NOTES
Hospitalist Progress Note      Chart reviewed. Appreciate ID input. Flagyl initiated. Non-billable rounding. Thanks for allowing us to participate in the care of [facility]. We will continue to follow along peripherally.  Please do not hesitate to contact us if needed.  +++++++++++++++++++++++++++++++++++++++++++++++++  uRiz C[address], [facility]

## 2021-06-14 NOTE — PLAN OF CARE
Problem: Depressive Behavior With or Without Suicide Precautions:  Goal: Able to verbalize acceptance of life and situations over which he or she has no control  Description: Able to verbalize acceptance of life and situations over which he or she has no control  6/13/2021 2249 by Taisha Suazo RN  Outcome: Ongoing  6/13/2021 1237 by Joellen Gallagher RN  Outcome: Ongoing     Problem: Depressive Behavior With or Without Suicide Precautions:  Goal: Able to verbalize and/or display a decrease in depressive symptoms  Description: Able to verbalize and/or display a decrease in depressive symptoms  6/13/2021 2249 by Taisha Suazo RN  Outcome: Ongoing  6/13/2021 1237 by Joellen Gallagher RN  Outcome: Ongoing     Problem: Pain:  Goal: Control of acute pain  Description: Control of acute pain  Outcome: Ongoing

## 2021-06-14 NOTE — PROGRESS NOTES
atraumatic   Eyes:    No pallor, no icterus,   Ears:    No obvious deformity or drainage.    Nose:   No nasal drainage   Throat:   Mucosa moist, no oral thrush   Neck:   Supple, no lymphadenopathy   Back:     no CVA tenderness   Lungs:     Clear to auscultation bilaterally, no wheeze    Heart:    Regular rate and rhythm, no murmur   Abdomen:     Soft, non-tender, bowel sounds present    Extremities:   No edema, no cyanosis    Pulses:   Dorsalis pedis palpable    Skin:   Flat papular lesion on labia major      CBC with Differential:      Lab Results   Component Value Date    WBC 13.8 06/09/2021    RBC 3.86 06/09/2021    HGB 11.5 06/09/2021    HCT 32.2 06/09/2021     06/09/2021    MCV 83.4 06/09/2021    MCH 29.8 06/09/2021    MCHC 35.7 06/09/2021    RDW 13.7 06/09/2021    SEGSPCT 63 05/09/2012    LYMPHOPCT 15.3 06/09/2021    MONOPCT 5.4 06/09/2021    MYELOPCT 0.9 08/13/2020    BASOPCT 0.3 06/09/2021    MONOSABS 0.74 06/09/2021    LYMPHSABS 2.11 06/09/2021    EOSABS 0.53 06/09/2021    BASOSABS 0.04 06/09/2021       CMP     Lab Results   Component Value Date     06/09/2021    K 3.3 06/09/2021    K 3.9 08/12/2020    CL 99 06/09/2021    CO2 25 06/09/2021    BUN 6 06/09/2021    CREATININE 0.4 06/09/2021    GFRAA >60 06/09/2021    LABGLOM >60 06/09/2021    GLUCOSE 113 06/09/2021    GLUCOSE 71 05/09/2012    PROT 6.8 08/12/2020    LABALBU 3.9 08/12/2020    LABALBU 4.5 05/09/2012    CALCIUM 9.3 06/09/2021    BILITOT 0.3 08/12/2020    ALKPHOS 63 08/12/2020    AST 14 08/12/2020    ALT 8 08/12/2020         Hepatic Function Panel:    Lab Results   Component Value Date    ALKPHOS 63 08/12/2020    ALT 8 08/12/2020    AST 14 08/12/2020    PROT 6.8 08/12/2020    BILITOT 0.3 08/12/2020    LABALBU 3.9 08/12/2020    LABALBU 4.5 05/09/2012       PT/INR:    Lab Results   Component Value Date    PROTIME 13.7 08/11/2020    INR 1.2 08/11/2020       TSH:    Lab Results   Component Value Date    TSH 1.690 07/01/2020       U/A: Lab Results   Component Value Date    COLORU Yellow 06/10/2021    PHUR 7.0 06/10/2021    WBCUA 5-10 06/10/2021    WBCUA 5-10 05/09/2012    RBCUA 1-3 06/10/2021    RBCUA NONE 05/09/2012    TRICHOMONAS Present 07/19/2020    BACTERIA MODERATE 06/10/2021    CLARITYU Clear 06/10/2021    SPECGRAV 1.020 06/10/2021    LEUKOCYTESUR LARGE 06/10/2021    UROBILINOGEN 1.0 06/10/2021    BILIRUBINUR Negative 06/10/2021    BILIRUBINUR SMALL 05/09/2012    BLOODU TRACE-INTACT 06/10/2021    GLUCOSEU Negative 06/10/2021    GLUCOSEU NEGATIVE 05/09/2012       ABG:  No results found for: VIA2YBT, BEART, O9VKIABA, PHART, THGBART, LLQ4DAC, PO2ART, UZL2LZL      IMPRESSION:     1. Bacterial vaginosis - symptomatic   2. R/O Syphilis ( RPR test is negative )   3. Hx of IVDU     RECOMMENDATIONS:      1. Flagyl 250 mg po q 12 hrs ( to reduce the risk of pre term birth )   2.  Check FTA-ABS for confirmatory test, - hold PCN

## 2021-06-15 LAB — RPR: NORMAL

## 2021-06-15 PROCEDURE — 6370000000 HC RX 637 (ALT 250 FOR IP): Performed by: INTERNAL MEDICINE

## 2021-06-15 PROCEDURE — 1240000000 HC EMOTIONAL WELLNESS R&B

## 2021-06-15 PROCEDURE — 6370000000 HC RX 637 (ALT 250 FOR IP): Performed by: PSYCHIATRY & NEUROLOGY

## 2021-06-15 PROCEDURE — 99231 SBSQ HOSP IP/OBS SF/LOW 25: CPT | Performed by: NURSE PRACTITIONER

## 2021-06-15 RX ADMIN — METRONIDAZOLE 250 MG: 500 TABLET ORAL at 08:43

## 2021-06-15 RX ADMIN — METRONIDAZOLE 250 MG: 500 TABLET ORAL at 20:58

## 2021-06-15 RX ADMIN — POLYETHYLENE GLYCOL 3350 17 G: 17 POWDER, FOR SOLUTION ORAL at 08:43

## 2021-06-15 RX ADMIN — METFORMIN HYDROCHLORIDE 1 TABLET: 500 TABLET, EXTENDED RELEASE ORAL at 08:43

## 2021-06-15 ASSESSMENT — PAIN SCALES - GENERAL
PAINLEVEL_OUTOF10: 0
PAINLEVEL_OUTOF10: 0

## 2021-06-15 NOTE — PROGRESS NOTES
Department of Internal Medicine  Infectious Diseases  Progress Note      C/C : ? Syphilis , BV       Pt is awake and alert  Denies fever and chills   Reports vaginal discharge, itching   Afebrile     Current Facility-Administered Medications   Medication Dose Route Frequency Provider Last Rate Last Admin    metroNIDAZOLE (FLAGYL) tablet 250 mg  250 mg Oral 2 times per day Nataliia Ann MD   250 mg at 06/15/21 0843    polyethylene glycol (GLYCOLAX) packet 17 g  17 g Oral Daily Aurelio Edmondson MD   17 g at 06/15/21 0843    acetaminophen (TYLENOL) tablet 500 mg  500 mg Oral Q6H PRN Trevor Thrasher MD        lidocaine (XYLOCAINE) 2 % jelly   Topical PRN MONICA Enriquez - NP        prenatal vitamin 27-1 MG tablet 1 tablet  1 tablet Oral Daily Carmen Stevenson MD   1 tablet at 06/15/21 0843       REVIEW OF SYSTEMS:    CONSTITUTIONAL:  Denies fever, chill or rigors, nausea or vomiting. HEENT: denies blurring of vision or double vision, denies hearing problem  RESPIRATORY: denies cough, shortness of breath, sputum expectoration, chest pain. CARDIOVASCULAR:  Denies palpitation  GASTROINTESTINAL:  Denies abdomen pain, diarrhea or constipation. GENITOURINARY:  Vaginal drainage, itching   INTEGUMENT: denies wound , rash  HEMATOLOGIC/LYMPHATIC:  Denies lymph node swelling, gum bleeding or easy bruising. MUSCULOSKELETAL:  Denies leg pain , joint pain , joint swelling  NEUROLOGICAL:  Denies light headed, dizziness    PHYSICAL EXAM:      Vitals:     Vitals:    06/15/21 0851   BP: (!) 127/50   Pulse: 100   Resp: 16   Temp: 97.3 °F (36.3 °C)   SpO2: 98%       General Appearance:    Awake, alert , no acute distress. Head:    Normocephalic, atraumatic   Eyes:    No pallor, no icterus,   Ears:    No obvious deformity or drainage.    Nose:   No nasal drainage   Throat:   Mucosa moist, no oral thrush   Neck:   Supple, no lymphadenopathy   Back:     no CVA tenderness   Lungs:     Clear to auscultation bilaterally, no SPECGRAV 1.020 06/10/2021    LEUKOCYTESUR LARGE 06/10/2021    UROBILINOGEN 1.0 06/10/2021    BILIRUBINUR Negative 06/10/2021    BILIRUBINUR SMALL 05/09/2012    BLOODU TRACE-INTACT 06/10/2021    GLUCOSEU Negative 06/10/2021    GLUCOSEU NEGATIVE 05/09/2012          Component Value Units   RPR Reflex to Titer and TPPA [[id number]]    Collected: 06/14/21 1525         RPR NON-REACTIVE   HEPATITIS PANEL, ACUTE [[id number]]    Collected: 06/13/21 1751    Updated: 06/14/21 1428    Specimen Source: Blood     Hep A IgM Non-Reactive    Hep B Core Ab, IgM Non-Reactive    Hep B S Ag Interp Non-Reactive    Hep C Ab Interp Non-Reactive   Narrative:     Collection has been rescheduled by [facility] at 06/13/2021 17:53 Reason:   Blood sent to lab    HIV Screen [[id number]]    Collected: 06/13/21 1751    Updated: 06/14/21 1428    Specimen Source: Blood     HIV-1/HIV-2 Ab Non-Reactive   Narrative:     Collection has been rescheduled by [facility] at 06/13/2021 17:53 Reason:   Blood sent to lab            IMPRESSION:     1. Bacterial vaginosis - symptomatic   2. R/O Syphilis ( RPR test is negative )   3. Hx of IVDU     RECOMMENDATIONS:      1. Flagyl 250 mg po q 12 hrs   2.  Check FTA-ABS

## 2021-06-15 NOTE — GROUP NOTE
Group Therapy Note    Date: 6/15/2021    Group Start Time: 1325  Group End Time: 1247  Group Topic: Cognitive Skills    [facility]    CLIFTON Lazo, ANNIE        Group Therapy Note    Attendees: 6         Patient's Goal:  Pt will be able to participate in a mindfulness meditation    Notes:  Pt made connections and participated in group    Status After Intervention:  Improved    Participation Level:  Active Listener    Participation Quality: Appropriate, Attentive, Sharing and Supportive      Speech:  normal      Thought Process/Content: Logical      Affective Functioning: Congruent      Mood: depressed      Level of consciousness:  Alert and Oriented x4      Response to Learning: Able to verbalize current knowledge/experience      Endings: None Reported    Modes of Intervention: Education, Support, Socialization, Exploration, Clarifying, Problem-solving and Activity      Discipline Responsible: /Counselor      Signature:  CLIFTON Lazo, ANNIE

## 2021-06-15 NOTE — PROGRESS NOTES
Patient attended goals group and stated daily goal as to control my temper. Group Therapy Note    Date: 6/15/2021  Start Time: 9:45  End Time:  10:15  Number of Participants: 12    Type of Group: Psychoeducation    Wellness Binder Information  Module Name: Self-Care  Session Number:  NA    Patient's Goal:  To id positive ways to take care of oneself. Notes: Attended group and was able to participate. Status After Intervention:  Improved    Participation Level:  Active Listener and Interactive    Participation Quality: Attentive, Sharing and Intrusive      Speech:  hesitant      Thought Process/Content: Linear      Affective Functioning: Flat      Mood: depressed      Level of consciousness:  Alert      Response to Learning: Progressing to goal      Endings: None Reported    Modes of Intervention: Education      Discipline Responsible: Psychoeducational Specialist      Signature:  ANNIE Daly

## 2021-06-15 NOTE — CARE COORDINATION
Pt continues to be social and bright on the unit per treatment team report. Pt's estimated discharge date is: Wednesday, 6/16. Pt will continue to monitor this pt's mood and behaviors.

## 2021-06-15 NOTE — PLAN OF CARE
Problem: Depressive Behavior With or Without Suicide Precautions:  Goal: Able to verbalize acceptance of life and situations over which he or she has no control  Description: Able to verbalize acceptance of life and situations over which he or she has no control  6/15/2021 0929 by Shima Arana RN  Outcome: Ongoing     Problem: Depressive Behavior With or Without Suicide Precautions:  Goal: Able to verbalize and/or display a decrease in depressive symptoms  Description: Able to verbalize and/or display a decrease in depressive symptoms  6/15/2021 0929 by Shima Arana RN  Outcome: Ongoing     Patient denies suicidal ideations, homicidal ideations, and hallucinations. Patient is compliant with prescribed medications and is in control of her behavior.

## 2021-06-15 NOTE — PLAN OF CARE
Patient is pleasant, calm, cooperative. Affect bright. Denies SI/HI/AVH and depression/anxiety. Patient states she is \"feeling better today. \" Has been in control of behaviors this shift. Observed out on the unit, social with select peers. Compliant with medications. Will continue to monitor and provide support.     Problem: Depressive Behavior With or Without Suicide Precautions:  Goal: Able to verbalize acceptance of life and situations over which he or she has no control  Description: Able to verbalize acceptance of life and situations over which he or she has no control  6/14/2021 2113 by Venus Wyman RN  Outcome: Ongoing     Problem: Depressive Behavior With or Without Suicide Precautions:  Goal: Able to verbalize and/or display a decrease in depressive symptoms  Description: Able to verbalize and/or display a decrease in depressive symptoms  6/14/2021 2113 by Venus Wyman RN  Outcome: Ongoing

## 2021-06-15 NOTE — PROGRESS NOTES
Spiritual Support Group Note    Number of Participants in Group:      8                  Time:   2:30    Goal: Relief from isolation and loneliness             Xiomy Sharing             Self-understanding and gain insight              Acceptance and belonging            Recognize they are not alone                Socialization             Empowerment       Encouragement    Topic:  [x] Spiritual Wellness and Self Care                  [x] Hope                     [] Connecting with Divine/Others        [] Thankfulness and Gratitude               [x]  Meaningfulness and Purpose               [] Forgiveness               [] Peace               [] Connect to [employer]      [] Other    Participation Level:   [x] Active Listener   [] Minimal   [] Monopolizing   [] Interactive   [] No Participation   []  Other:     Attention:   [x] Alert   [] Distractible   [] Drowsy   [] Poor   [] Other:    Manner:   [x] Cooperative   [] Suspicious   [] Withdrawn   [] Guarded   [] Irritable   [] Inhospitable   [] Other:     Others Comments from Group:

## 2021-06-15 NOTE — PROGRESS NOTES
BEHAVIORAL HEALTH FOLLOW-UP NOTE     6/15/2021     Patient was seen and examined in person, Chart reviewed   Patient's case discussed with staff/team    Chief Complaint: Rude, intrusive dismissive of personal barriers and boundaries    Interim History: Patient observed in the day room this morning. Initially on encounter she is pleasant and cooperative however begins to demonstrate a very labile mood with impulsivity and poor control of her behaviors. She is interrupting, intrusive her affect is very irritable she is easily agitated she threw the phone after conversation with her mother. She is impulsive with poor insight and judgment. She has had behavioral outbursts 2 days in a row on the unit requiring therapeutic quiet time in her room. Appetite:   [x] Normal/Unchanged  [] Increased  [] Decreased      Sleep:       [x] Normal/Unchanged  [] Fair       [] Poor              Energy:    [x] Normal/Unchanged  [] Increased  [] Decreased        SI [] Present  [x] Absent    HI  []Present  [x] Absent     Aggression:  [] yes  [x] no    Patient is [x] able  [] unable to CONTRACT FOR SAFETY     PAST MEDICAL/PSYCHIATRIC HISTORY:   Past Medical History:   Diagnosis Date    ADHD     Autism     Bipolar 1 disorder ([facility] [address]     PTSD (post-traumatic stress disorder)        FAMILY/SOCIAL HISTORY:  No family history on file.   Social History     Socioeconomic History    Marital status:      Spouse name: Not on file    Number of children: Not on file    Years of education: Not on file    Highest education level: Not on file   Occupational History    Not on file   Tobacco Use    Smoking status: Current Every Day Smoker     Packs/day: 0.50     Types: Cigarettes    Smokeless tobacco: Never Used   Vaping Use    Vaping Use: Never used   Substance and Sexual Activity    Alcohol use: No    Drug use: Yes     Types: Marijuana    Sexual activity: Not on file   Other Topics Concern    Not on file   Social History Narrative    Not on file     Social Determinants of Health     Financial Resource Strain:     Difficulty of Paying Living Expenses:    Food Insecurity:     Worried About Running Out of Food in the Last Year:     [address] in the Last Year:    Transportation Needs:     Lack of Transportation (Medical):  Lack of Transportation (Non-Medical):    Physical Activity:     Days of Exercise per Week:     Minutes of Exercise per Session:    Stress:     Feeling of Stress :    Social Connections:     Frequency of Communication with Friends and Family:     Frequency of Social Gatherings with Friends and Family:     Attends Yazidism Services:     Active Member of Clubs or Organizations:     Attends Club or Organization Meetings:     Marital Status:    Intimate Partner Violence:     Fear of Current or Ex-Partner:     Emotionally Abused:     Physically Abused:     Sexually Abused:            ROS:  [x] All negative/unchanged except if checked.  Explain positive(checked items) below:  [] Constitutional  [] Eyes  [] Ear/Nose/Mouth/Throat  [] Respiratory  [] CV  [] GI  []   [] Musculoskeletal  [] Skin/Breast  [] Neurological  [] Endocrine  [] Heme/Lymph  [] Allergic/Immunologic    Explanation:     MEDICATIONS:    Current Facility-Administered Medications:     metroNIDAZOLE (FLAGYL) tablet 250 mg, 250 mg, Oral, 2 times per day, Corona العلي MD, 250 mg at 06/15/21 0843    polyethylene glycol (GLYCOLAX) packet 17 g, 17 g, Oral, Daily, Ramon Devlin MD, 17 g at 06/15/21 0843    acetaminophen (TYLENOL) tablet 500 mg, 500 mg, Oral, Q6H PRN, Delmis Ritchie MD    lidocaine (XYLOCAINE) 2 % jelly, , Topical, PRN, Ernestine Eisenmenger, APRN - NP    prenatal vitamin 27-1 MG tablet 1 tablet, 1 tablet, Oral, Daily, Rosie Dutta MD, 1 tablet at 06/15/21 0843      Examination:  BP (!) 127/50   Pulse 100   Temp 97.3 °F (36.3 °C) (Temporal)   Resp 16   Ht 5' 3\" (1.6 m)   Wt 169 lb (76.7 kg)   LMP 03/01/2021 (Exact Date)   SpO2 98%   BMI 29.94 kg/m²   Gait - steady  Medication side effects(SE): Denies    Mental Status Examination:    Level of consciousness:  within normal limits   Appearance:  fair grooming and fair hygiene  Behavior/Motor:  no abnormalities noted  Attitude toward examiner:  cooperative  Speech:  spontaneous, normal rate and normal volume   Mood: \" I am good. \"  Affect: Appropriate and pleasant  Thought processes: Linear without flight of ideas loose associations  Thought content: Devoid of any auditory visual hallucinations delusions or perceptual abnormalities. Denies SI/HI intent or plan  Cognition:  oriented to person, place, and time   Concentration intact  Insight improving  Judgement improving    ASSESSMENT:   Patient symptoms are:  [] Well controlled  [x] Improving  [] Worsening  [] No change      Diagnosis:   Principal Problem:    Bipolar disorder, curr episode mixed, severe, w/o psychotic features ([facility] [address]  Active Problems:    Depression with suicidal ideation  Resolved Problems:    * No resolved hospital problems. *      LABS:    No results for input(s): WBC, HGB, PLT in the last 72 hours. No results for input(s): NA, K, CL, CO2, BUN, CREATININE, GLUCOSE in the last 72 hours. No results for input(s): BILITOT, ALKPHOS, AST, ALT in the last 72 hours.   Lab Results   Component Value Date    LABAMPH NOT DETECTED 06/10/2021    LABAMPH POSITIVE 05/09/2012    BARBSCNU NOT DETECTED 06/10/2021    LABBENZ NOT DETECTED 06/10/2021    LABBENZ NOT DETECTED 05/09/2012    CANNAB NOT DETECTED  03/13/2013    COCAINESCRN NOT DETECTED 03/13/2013    LABMETH NOT DETECTED 06/10/2021    OPIATESCREENURINE NOT DETECTED 06/10/2021    PHENCYCLIDINESCREENURINE NOT DETECTED 06/10/2021    PPXUR NOT DETECTED 03/13/2013    ETOH <10 06/10/2021     Lab Results   Component Value Date    TSH 1.690 07/01/2020     No results found for: LITHIUM  No results found for: VALPROATE, CBMZ        Treatment Plan:  Reviewed current Medications with the patient. Risks, benefits, side effects, drug-to-drug interactions and alternatives to treatment were discussed. Collateral information:   CD evaluation  Encourage patient to attend group and other milieu activities. Discharge planning discussed with the patient and treatment team.    We will not order any psychiatric medication at this time due to the pregnancy.     PSYCHOTHERAPY/COUNSELING:  [x] Therapeutic interview  [x] Supportive  [] CBT  [] Ongoing  [] Other    [x] Patient continues to need, on a daily basis, active treatment furnished directly by or requiring the supervision of inpatient psychiatric personnel      Anticipated Length of stay: 3 to 7 days based on stability            Electronically signed by Carmen Officer, APRN - CNP on 6/15/2021 at 11:32 AM

## 2021-06-16 VITALS
RESPIRATION RATE: 16 BRPM | HEIGHT: 63 IN | SYSTOLIC BLOOD PRESSURE: 102 MMHG | DIASTOLIC BLOOD PRESSURE: 51 MMHG | WEIGHT: 169 LBS | BODY MASS INDEX: 29.95 KG/M2 | HEART RATE: 76 BPM | TEMPERATURE: 97.7 F | OXYGEN SATURATION: 98 %

## 2021-06-16 PROCEDURE — 6370000000 HC RX 637 (ALT 250 FOR IP): Performed by: PSYCHIATRY & NEUROLOGY

## 2021-06-16 PROCEDURE — 6370000000 HC RX 637 (ALT 250 FOR IP): Performed by: INTERNAL MEDICINE

## 2021-06-16 PROCEDURE — 99239 HOSP IP/OBS DSCHRG MGMT >30: CPT | Performed by: NURSE PRACTITIONER

## 2021-06-16 PROCEDURE — 86780 TREPONEMA PALLIDUM: CPT

## 2021-06-16 RX ORDER — METRONIDAZOLE 250 MG/1
250 TABLET ORAL EVERY 12 HOURS SCHEDULED
Qty: 20 TABLET | Refills: 0 | Status: SHIPPED | OUTPATIENT
Start: 2021-06-16 | End: 2021-06-26

## 2021-06-16 RX ADMIN — METFORMIN HYDROCHLORIDE 1 TABLET: 500 TABLET, EXTENDED RELEASE ORAL at 08:55

## 2021-06-16 RX ADMIN — METRONIDAZOLE 250 MG: 500 TABLET ORAL at 08:55

## 2021-06-16 ASSESSMENT — PAIN SCALES - GENERAL: PAINLEVEL_OUTOF10: 0

## 2021-06-16 NOTE — PROGRESS NOTES
Infectious disease following patient for bacterial vaginosis  Hospitalist medicine will sign off  Please reconsult if needed

## 2021-06-16 NOTE — DISCHARGE SUMMARY
DISCHARGE SUMMARY      Patient ID:  Rc Kramer  [id number]  23 y.o.  2000    Admit date: 6/10/2021    Discharge date and time: 6/16/2021    Admitting Physician: Cheo Nguyen MD     Discharge Physician: Dr Asia Velazquez MD    Discharge Diagnoses:   Patient Active Problem List   Diagnosis    Depression affecting pregnancy    Depression with suicidal ideation    Bipolar disorder, curr episode mixed, severe, w/o psychotic features ([facility]       Admission Condition: poor    Discharged Condition: stable    Admission Circumstance: The pt stated that she initially came in to get her pregnancy checked b/c she got walked on by her dog.  She stated that while here she decided to be straight forward with the fact that she has been depressed and suicidal. [facility]oflor Cleary stated that she just moved here 2 weeks ago from [place] b/c she is 13 weeks pregnant and the dad is a drug addict and \"a deadbeat\".  She stated that on memorial day she had the plan to OD on pills and jump in the river to never be found.  She stated that today she thought that she would hang herself in the garage and that her mom would find her. She was medically cleared in the emergency department, she received an ultrasound which confirmed fetal viability and gestation of 14 weeks and 5 days, toxicology in ED was positive for THC,  she was mated to [facility] [address]. for psychiatric evaluation and stabilization      PAST MEDICAL/PSYCHIATRIC HISTORY:   Past Medical History:   Diagnosis Date    ADHD     Autism     Bipolar 1 disorder ([facility]     PTSD (post-traumatic stress disorder)        FAMILY/SOCIAL HISTORY:  No family history on file.   Social History     Socioeconomic History    Marital status:      Spouse name: Not on file    Number of children: Not on file    Years of education: Not on file    Highest education level: Not on file   Occupational History    Not on file   Tobacco Use    Smoking status: Current Every Day Smoker     Packs/day: 0.50 Types: Cigarettes    Smokeless tobacco: Never Used   Vaping Use    Vaping Use: Never used   Substance and Sexual Activity    Alcohol use: No    Drug use: Yes     Types: Marijuana    Sexual activity: Not on file   Other Topics Concern    Not on file   Social History Narrative    Not on file     Social Determinants of Health     Financial Resource Strain:     Difficulty of Paying Living Expenses:    Food Insecurity:     Worried About Running Out of Food in the Last Year:     Ran Out of Food in the Last Year:    Transportation Needs:     Lack of Transportation (Medical):      Lack of Transportation (Non-Medical):    Physical Activity:     Days of Exercise per Week:     Minutes of Exercise per Session:    Stress:     Feeling of Stress :    Social Connections:     Frequency of Communication with Friends and Family:     Frequency of Social Gatherings with Friends and Family:     Attends Hoahaoism Services:     Active Member of Clubs or Organizations:     Attends Club or Organization Meetings:     Marital Status:    Intimate Partner Violence:     Fear of Current or Ex-Partner:     Emotionally Abused:     Physically Abused:     Sexually Abused:        MEDICATIONS:    Current Facility-Administered Medications:     metroNIDAZOLE (FLAGYL) tablet 250 mg, 250 mg, Oral, 2 times per day, Diana Maldonado MD, 250 mg at 06/16/21 0855    polyethylene glycol (GLYCOLAX) packet 17 g, 17 g, Oral, Daily, Lashaun Mejia MD, 17 g at 06/15/21 0843    acetaminophen (TYLENOL) tablet 500 mg, 500 mg, Oral, Q6H PRN, Aniket Garcia MD    lidocaine (XYLOCAINE) 2 % jelly, , Topical, PRN, MONICA Palacios - NP    prenatal vitamin 27-1 MG tablet 1 tablet, 1 tablet, Oral, Daily, Lei Castro MD, 1 tablet at 06/16/21 0855    Examination:  BP (!) 102/51   Pulse 76   Temp 97.7 °F (36.5 °C) (Temporal)   Resp 16   Ht 5' 3\" (1.6 m)   Wt 169 lb (76.7 kg)   LMP 03/01/2021 (Exact Date)   SpO2 98%   BMI 29.94 Results   Component Value Date    LABAMPH NOT DETECTED 06/10/2021    LABAMPH POSITIVE 05/09/2012    BARBSCNU NOT DETECTED 06/10/2021    LABBENZ NOT DETECTED 06/10/2021    LABBENZ NOT DETECTED 05/09/2012    CANNAB NOT DETECTED  03/13/2013    COCAINESCRN NOT DETECTED 03/13/2013    LABMETH NOT DETECTED 06/10/2021    OPIATESCREENURINE NOT DETECTED 06/10/2021    PHENCYCLIDINESCREENURINE NOT DETECTED 06/10/2021    PPXUR NOT DETECTED 03/13/2013    ETOH <10 06/10/2021     Lab Results   Component Value Date    TSH 1.690 07/01/2020     No results found for: LITHIUM  No results found for: VALPROATE, CBMZ    RISK ASSESSMENT AT DISCHARGE: Low risk for suicide and homicide. Treatment Plan:  The patient's diagnosis, treatment plan, medication management were formulated after patient was seen directly by the attending physician and myself and all relevant documentation was reviewed. Reviewed current Medications with the patient. Education provided on the complaince with treatment. Risk, benefit, side effects, possible outcomes of the medication and alternatives discussed with the patient and the patient demonstrated understanding. The patient was also educated that the outcome of treatment will depend on the medication compliance as directed by the prescribers along with regular follow-up, compliance with the labs and other work-up, as clinically indicated. Encourage patient to attend outpatient follow up appointment and therapy, outpatient follow-up appointments have been scheduled prior to discharge. Due to her psychiatric history, her impulsive behaviors the psychiatric team will recommend that the patient seek outpatient treatment for psychiatric conditions after 16 weeks of gestation to minimize risk of affect to the fetus during organogenesis. .    The patient has been extensively counseled on the use of psychotropic medications during pregnancy.  And she states that she does not wish to continue psychotropic medications at this time due to pregnancy. Patient states that she does not want to do anything that might harm her baby     The concerns of this psychiatric team have been extensively discussed with the patient including the risks and benefits of treatment. She has the capacity to understand education provided and wishes to continue with treatment even with discussion of risk of use of psychotropic medications during pregnancy. She was able to verbalize understanding of education and counseling provided.       Patient was advised to call the outpatient provider, visit the nearest ED or call 911 if symptoms are not manageable. Patient's family member was contacted prior to the discharge the patient has been discharged home to stay with her mother in psychiatrically stable condition. Medication List      ASK your doctor about these medications    acetaminophen 500 MG tablet  Commonly known as: APAP Extra Strength  Take 1 tablet by mouth every 6 hours as needed for Pain     cefdinir 300 MG capsule  Commonly known as: OMNICEF  Take 1 capsule by mouth 2 times daily for 7 days     Lidocaine (Anorectal) 5 % Gel  Apply 1 Dose topically 5 times daily     ondansetron 4 MG tablet  Commonly known as: ZOFRAN     penicillin G potassium [id number] units injection  Inject 2.4 Million Units into the muscle once for 1 dose  Start taking on: June 17, 2021     PRENATAL 1+1 PO     valACYclovir 1 g tablet  Commonly known as: Valtrex  Take 1 tablet by mouth 2 times daily for 10 days          NOTE: This report was transcribed using voice recognition software. Every effort was made to ensure accuracy; however, inadvertent computerized transcription errors may be present.       TIME SPEND - 35 MINUTES TO COMPLETE THE EVALUATION, DISCHARGE SUMMARY, MEDICATION RECONCILIATION AND FOLLOW UP CARE     Signed:  MONICA Garcia CNP  6/16/2021  1:52 PM

## 2021-06-16 NOTE — GROUP NOTE
Group Therapy Note     Date: 6/16/2021     Group Start Time: 1355  Group End Time: 6677  Group Topic: Cognitive Skills     [facility] ACUTE [facility] 2    Sommer Gray           Group Therapy Note     Attendees: 14           Patient's Goal: Pt will be able to verbalize and understand how to use relaxation techniques in their personal lives.      Notes:  Pt participated in group and made connections    Status After Intervention:  Improved    Participation Level:  Active Listener and Interactive    Participation Quality: Appropriate, Attentive and Sharing      Speech:  normal      Thought Process/Content: Logical      Affective Functioning: Congruent      Mood: anxious      Level of consciousness:  Alert and Oriented x4      Response to Learning: Able to verbalize current knowledge/experience and Able to verbalize/acknowledge new learning      Endings: None Reported    Modes of Intervention: Education, Support, Socialization, Exploration and Clarifying      Discipline Responsible: /Counselor      Signature:  Sommer Gray

## 2021-06-16 NOTE — CARE COORDINATION
In order to ensure appropriate transition and discharge planning is in place, the following documents have been transmitted to [facility], as the new outpatient provider:     · The d/c diagnosis was transmitted to the next care provider  · The reason for hospitalization was transmitted to the next care provider  · The d/c medications (dosage and indication) were transmitted to the next care provider   · The continuing care plan was transmitted to the next care provider

## 2021-06-16 NOTE — PROGRESS NOTES
[facility]  Discharge Note    Pt discharged with followings belongings:   Dentures: None  Vision - Corrective Lenses: None  Hearing Aid: None  Jewelry: None  Body Piercings Removed: N/A (no visible body piercing.)  Clothing: Footwear, Pants, Shirt, Undergarments (Comment) (1 pair of shoes, 1 pants, 1 shirt, 1 undergarment.)  Were All Patient Medications Collected?: Not Applicable (no medication with the patient.)  Other Valuables: None   Valuables sent home with patient. Valuables retrieved from safe, Security envelope number:  n/a and returned to patient. Patient education on aftercare instructions: yes  Information faxed to n/a by n/a Patient verbalize understanding of AVS:  yes.     Status EXAM upon discharge:  Status and Exam  Normal: No  Facial Expression: Brightened  Affect: Congruent  Level of Consciousness: Alert  Mood:Normal: Yes  Mood: Anxious  Motor Activity:Normal: Yes  Interview Behavior: Cooperative  Preception: [place] to Person, [place] to Time, [place] to Place, [place] to Situation  Attention:Normal: No  Attention: Distractible  Thought Processes: Circumstantial  Thought Content:Normal: No  Thought Content: Preoccupations  Hallucinations: None  Delusions: No  Memory:Normal: Yes  Insight and Judgment: No  Insight and Judgment: Poor Judgment  Present Suicidal Ideation: No  Present Homicidal Ideation: No      Metabolic Screening:    Lab Results   Component Value Date    LABA1C 4.9 06/11/2021       Lab Results   Component Value Date    CHOL 147 06/11/2021    CHOL 136 05/16/2012    CHOL 141 03/29/2012     Lab Results   Component Value Date    TRIG 126 06/11/2021    TRIG 70 05/16/2012    TRIG 48 03/29/2012     Lab Results   Component Value Date    HDL 47 06/11/2021    HDL 54.0 05/16/2012    HDL 56.0 03/29/2012     No components found for: [facility] EVALUATION AND TREATMENT [place]  Lab Results   Component Value Date    LABVLDL 25 06/11/2021       Srinivasan Murphy RN

## 2021-06-16 NOTE — PROGRESS NOTES
Heart:    Regular rate and rhythm, no murmur   Abdomen:     Soft, non-tender, bowel sounds present    Extremities:   No edema, no cyanosis    Pulses:   Dorsalis pedis palpable    Skin:   Flat papular lesion on labia major      CBC with Differential:      Lab Results   Component Value Date    WBC 13.8 06/09/2021    RBC 3.86 06/09/2021    HGB 11.5 06/09/2021    HCT 32.2 06/09/2021     06/09/2021    MCV 83.4 06/09/2021    MCH 29.8 06/09/2021    MCHC 35.7 06/09/2021    RDW 13.7 06/09/2021    SEGSPCT 63 05/09/2012    LYMPHOPCT 15.3 06/09/2021    MONOPCT 5.4 06/09/2021    MYELOPCT 0.9 08/13/2020    BASOPCT 0.3 06/09/2021    MONOSABS 0.74 06/09/2021    LYMPHSABS 2.11 06/09/2021    EOSABS 0.53 06/09/2021    BASOSABS 0.04 06/09/2021       CMP     Lab Results   Component Value Date     06/09/2021    K 3.3 06/09/2021    K 3.9 08/12/2020    CL 99 06/09/2021    CO2 25 06/09/2021    BUN 6 06/09/2021    CREATININE 0.4 06/09/2021    GFRAA >60 06/09/2021    LABGLOM >60 06/09/2021    GLUCOSE 113 06/09/2021    GLUCOSE 71 05/09/2012    PROT 6.8 08/12/2020    LABALBU 3.9 08/12/2020    LABALBU 4.5 05/09/2012    CALCIUM 9.3 06/09/2021    BILITOT 0.3 08/12/2020    ALKPHOS 63 08/12/2020    AST 14 08/12/2020    ALT 8 08/12/2020         Hepatic Function Panel:    Lab Results   Component Value Date    ALKPHOS 63 08/12/2020    ALT 8 08/12/2020    AST 14 08/12/2020    PROT 6.8 08/12/2020    BILITOT 0.3 08/12/2020    LABALBU 3.9 08/12/2020    LABALBU 4.5 05/09/2012       PT/INR:    Lab Results   Component Value Date    PROTIME 13.7 08/11/2020    INR 1.2 08/11/2020       TSH:    Lab Results   Component Value Date    TSH 1.690 07/01/2020       U/A:    Lab Results   Component Value Date    COLORU Yellow 06/10/2021    PHUR 7.0 06/10/2021    WBCUA 5-10 06/10/2021    WBCUA 5-10 05/09/2012    RBCUA 1-3 06/10/2021    RBCUA NONE 05/09/2012    TRICHOMONAS Present 07/19/2020    BACTERIA MODERATE 06/10/2021    CLARITYU Clear 06/10/2021 SPECGRAV 1.020 06/10/2021    LEUKOCYTESUR LARGE 06/10/2021    UROBILINOGEN 1.0 06/10/2021    BILIRUBINUR Negative 06/10/2021    BILIRUBINUR SMALL 05/09/2012    BLOODU TRACE-INTACT 06/10/2021    GLUCOSEU Negative 06/10/2021    GLUCOSEU NEGATIVE 05/09/2012          Component Value Units   RPR Reflex to Titer and TPPA [[id number]]    Collected: 06/14/21 1525         RPR NON-REACTIVE   HEPATITIS PANEL, ACUTE [[id number]]    Collected: 06/13/21 1751    Updated: 06/14/21 1428    Specimen Source: Blood     Hep A IgM Non-Reactive    Hep B Core Ab, IgM Non-Reactive    Hep B S Ag Interp Non-Reactive    Hep C Ab Interp Non-Reactive   Narrative:     Collection has been rescheduled by [facility] at 06/13/2021 17:53 Reason:   Blood sent to lab    HIV Screen [[id number]]    Collected: 06/13/21 1751    Updated: 06/14/21 1428    Specimen Source: Blood     HIV-1/HIV-2 Ab Non-Reactive   Narrative:     Collection has been rescheduled by [facility] at 06/13/2021 17:53 Reason:   Blood sent to lab            IMPRESSION:     1. Bacterial vaginosis - symptomatic   2. R/O Syphilis ( RPR test is negative x 2 at [facility] ,but +Ve RPR titer 1/128 at [facility] - can be error  )   3. Hx of IVDU     RECOMMENDATIONS:      1. Flagyl 250 mg po q 12 hrs   2. Check FTA-ABS  3.  Repeat RPR test in the same lab ([facility] )   Pt can be discharged from [facility] POV

## 2021-06-16 NOTE — GROUP NOTE
Group Therapy Note    Date: 6/16/2021    Group Start Time: 1000  Group End Time: 0368  Group Topic: Psychoeducation    [facility] ACUTE [facility] 1    Dee Gaines, CTRS        Group Therapy Note    Number of participants: 11  Type of group: Psychoeducation  Mode of intervention: Education, Support, Socialization, Exploration, Clarifying, and Problem-solving  Topic: Time Management Tips   Objective: pt will identify 1 way to improve time management skills in recovery. Patient's Goal:  \"Apply what I learned here at home\"     Notes:   Pt was interactive during group sharing 1 way to improve time management skills in recovery. Pt gave support and feedback to others. Status After Intervention:  Improved    Participation Level:  Active Listener and Interactive    Participation Quality: Appropriate, Attentive, Sharing and Supportive      Speech:  normal      Thought Process/Content: Logical      Affective Functioning: Congruent      Mood: euthymic      Level of consciousness:  Alert, Oriented x4 and Attentive      Response to Learning: Able to verbalize current knowledge/experience, Able to verbalize/acknowledge new learning, Able to retain information, Capable of insight, Able to change behavior and Progressing to goal      Endings: None Reported    Modes of Intervention: Education, Support, Socialization, Exploration, Clarifying and Problem-solving

## 2021-06-16 NOTE — PLAN OF CARE
Problem: Depressive Behavior With or Without Suicide Precautions:  Goal: Able to verbalize acceptance of life and situations over which he or she has no control  Description: Able to verbalize acceptance of life and situations over which he or she has no control  6/15/2021 2029 by Mele Dahl RN  Outcome: Met This Shift     Problem: Pain:  Goal: Pain level will decrease  Description: Pain level will decrease  Outcome: Met This Shift     Problem: Pain:  Goal: Control of acute pain  Description: Control of acute pain  Outcome: Met This Shift    Patient denies SI/HI and hallucinations. Patient is bright, social, pleasant, and cooperative. Patient takes prescribed medications without issue. Will continue to offer support and comfort to patient.

## 2021-06-16 NOTE — CARE COORDINATION
Renata spoke with pt's mother, Jael Albright. Jael Albright states that she has no concerns with this pt being discharged today.  Pt's sister will come pick her up around 3:30 PM. Renata gave the nurse's station number to call upon arrival.

## 2021-06-16 NOTE — PLAN OF CARE
Problem: Depressive Behavior With or Without Suicide Precautions:  Goal: Able to verbalize acceptance of life and situations over which he or she has no control  Description: Able to verbalize acceptance of life and situations over which he or she has no control  6/16/2021 0901 by Brian Sims RN  Outcome: Ongoing     Problem: Depressive Behavior With or Without Suicide Precautions:  Goal: Able to verbalize and/or display a decrease in depressive symptoms  Description: Able to verbalize and/or display a decrease in depressive symptoms  Outcome: Ongoing     Patient denies suicidal ideations, homicidal ideations, and hallucinations. Patient is compliant with prescribed medications and is in control of her behavior.

## 2021-06-16 NOTE — PROGRESS NOTES
CLINICAL PHARMACY NOTE: MEDS TO BEDS    Total # of Prescriptions Filled: 1   The following medications were delivered to the patient:  · Metronidazole 250mg    Additional Documentation:    Delivered to RN 6/16 @3:55pm

## 2021-06-19 LAB — TREPONEMA PALLIDUM ANTIBODIES: REACTIVE

## 2022-09-01 ENCOUNTER — HOSPITAL ENCOUNTER (EMERGENCY)
Age: 22
Discharge: HOME | End: 2022-09-01
Payer: COMMERCIAL

## 2022-09-01 VITALS
HEIGHT: 62 IN | BODY MASS INDEX: 27.79 KG/M2 | OXYGEN SATURATION: 99 % | SYSTOLIC BLOOD PRESSURE: 123 MMHG | RESPIRATION RATE: 18 BRPM | TEMPERATURE: 98.5 F | HEART RATE: 98 BPM | DIASTOLIC BLOOD PRESSURE: 85 MMHG | WEIGHT: 151 LBS

## 2022-09-01 DIAGNOSIS — J06.9 ACUTE UPPER RESPIRATORY INFECTION: Primary | ICD-10-CM

## 2022-09-01 DIAGNOSIS — N30.00 ACUTE CYSTITIS WITHOUT HEMATURIA: ICD-10-CM

## 2022-09-01 LAB
BACTERIA: ABNORMAL /HPF
BILIRUBIN URINE: ABNORMAL
BLOOD, URINE: NEGATIVE
CLARITY: ABNORMAL
COLOR: ABNORMAL
EPITHELIAL CELLS, UA: ABNORMAL /HPF
GLUCOSE URINE: NEGATIVE MG/DL
KETONES, URINE: ABNORMAL MG/DL
LEUKOCYTE ESTERASE, URINE: ABNORMAL
NITRITE, URINE: NEGATIVE
PH UA: 6 (ref 5–9)
PROTEIN UA: ABNORMAL MG/DL
RBC UA: ABNORMAL /HPF (ref 0–2)
SARS-COV-2, NAAT: NOT DETECTED
SPECIFIC GRAVITY UA: >=1.03 (ref 1–1.03)
UROBILINOGEN, URINE: 1 E.U./DL
WBC UA: ABNORMAL /HPF (ref 0–5)

## 2022-09-01 PROCEDURE — 87088 URINE BACTERIA CULTURE: CPT

## 2022-09-01 PROCEDURE — 87635 SARS-COV-2 COVID-19 AMP PRB: CPT

## 2022-09-01 PROCEDURE — 81001 URINALYSIS AUTO W/SCOPE: CPT

## 2022-09-01 PROCEDURE — 99283 EMERGENCY DEPT VISIT LOW MDM: CPT

## 2022-09-01 RX ORDER — CEPHALEXIN 500 MG/1
500 CAPSULE ORAL 4 TIMES DAILY
Qty: 28 CAPSULE | Refills: 0 | Status: SHIPPED | OUTPATIENT
Start: 2022-09-01 | End: 2022-09-08

## 2022-09-01 ASSESSMENT — PAIN DESCRIPTION - LOCATION: LOCATION: ABDOMEN;THROAT

## 2022-09-01 ASSESSMENT — PAIN SCALES - GENERAL: PAINLEVEL_OUTOF10: 8

## 2022-09-01 ASSESSMENT — PAIN - FUNCTIONAL ASSESSMENT: PAIN_FUNCTIONAL_ASSESSMENT: 0-10

## 2022-09-01 NOTE — Clinical Note
Kaya Funes was seen and treated in our emergency department on 9/1/2022. Covid testing today in ED is negative.      Kayla Borjas PA-C

## 2022-09-01 NOTE — ED PROVIDER NOTES
[facility]                                                                                                                                    Department of Emergency Medicine   ED  Provider Note  Admit Date/RoomTime: 9/1/2022  4:42 PM  ED Room: 25/25        HPI:  9/1/22,   Time: 4:59 PM EDT         Jeffry Adame is a 19 y.o. female presenting to the ED for cough, congestion, abdominal pain, beginning few ago. The complaint has been persistent, mild in severity, and worsened by nothing. The patient is 17 weeks pregnant. States that she is following with Dr. Joan Ni. The pregnancy is going well. The patient comes in today with some lower abdominal pain as well as upper respiratory symptoms. She reports cough, congestion, body aches and a sore throat. The patient was not vaccinated for COVID-19. She is not certain about any known exposure but works at a local convenience store. Denies any urinary burning, frequency or hematuria. No vaginal bleeding reported. She is having normal bowel movements. ROS:     Constitutional: Negative for fever and chills  HENT: Negative for ear pain, sore throat and sinus pressure  Eyes: Negative for pain, discharge and redness  Respiratory:  Negative for shortness of breath, cough and wheezing  Cardiovascular: Negative for CP, edema or palpitations  Gastrointestinal: See HPI  Genitourinary:  See HPI  Musculoskeletal: Negative for back pain and arthralgia  Skin: Negative for rash and wound  Neurological: Negative for weakness and headaches  Hematological: Negative for adenopathy    All other systems reviewed and are negative      -------------------------------- PAST HISTORY ----------------------------------  Past Medical History:  has a past medical history of ADHD, Autism, Bipolar 1 disorder ([facility] [address] and PTSD (post-traumatic stress disorder). Past Surgical History:  has no past surgical history on file.     Social History:  reports that she has been smoking cigarettes. She has been smoking an average of .5 packs per day. She has never used smokeless tobacco. She reports current drug use. Drug: Marijuana [place]). She reports that she does not drink alcohol. Family History: family history is not on file. The patients home medications have been reviewed. Allergies: Shellfish-derived products and Fish-derived products    --------------------------------- RESULTS ------------------------------------------  All laboratory and radiology results have been personally reviewed by myself   LABS:  Results for orders placed or performed during the hospital encounter of 09/01/22   COVID-19, Rapid    Specimen: Nasopharyngeal Swab   Result Value Ref Range    SARS-CoV-2, NAAT Not Detected Not Detected   Urinalysis   Result Value Ref Range    Color, UA CARLYLE (A) Straw/Yellow    Clarity, UA SLCLOUDY Clear    Glucose, Ur Negative Negative mg/dL    Bilirubin Urine MODERATE (A) Negative    Ketones, Urine TRACE (A) Negative mg/dL    Specific Gravity, UA >=1.030 1.005 - 1.030    Blood, Urine Negative Negative    pH, UA 6.0 5.0 - 9.0    Protein, UA TRACE Negative mg/dL    Urobilinogen, Urine 1.0 <2.0 E.U./dL    Nitrite, Urine Negative Negative    Leukocyte Esterase, Urine MODERATE (A) Negative       RADIOLOGY:  Interpreted by Radiologist.  No orders to display       ----------------- NURSING NOTES AND VITALS REVIEWED ---------------   The nursing notes within the ED encounter and vital signs as below have been reviewed. /85   Pulse (!) 108   Temp 98.5 °F (36.9 °C) (Oral)   Resp 24   Ht 5' 2\" (1.575 m)   Wt 151 lb (68.5 kg)   SpO2 98%   BMI 27.62 kg/m²   Oxygen Saturation Interpretation: Normal      --------------------------------PHYSICAL EXAM------------------------------------      Constitutional/General: Alert and oriented x3, well appearing, non toxic in NAD  Head: NC/AT  Eyes: PERRL, EOMI  TM's intact and clear. Posterior pharynx unremarkable.      Mouth: Oropharynx clear, handling secretions, no trismus  Neck: Supple, full ROM, no meningeal signs  Pulmonary: Lungs clear to auscultation bilaterally, no wheezes, rales, or rhonchi. Not in respiratory distress  Cardiovascular:  Regular rate and rhythm, no murmurs, gallops, or rubs. 2+ distal pulses  Abdomen: Soft, + BS. No distension. Nontender. No palpable rigidity, rebound or guarding  Extremities: Moves all extremities x 4. Warm and well perfused  Skin: warm and dry without rash  Neurologic: GCS 15,  Intact. No focal deficits  Psych: Normal Affect      ------------------------ ED COURSE/MEDICAL DECISION MAKING----------------------  Medications - No data to display      Medical Decision Making:    Covid testing negative. UA abnormal.    Plan Keflex for home. Normal FHT's. No vaginal bleeding here. F/U Dr. Brissa Webster as planned. Pt otherwise reassured. Counseling: The emergency provider has spoken with the patient and discussed todays results, in addition to providing specific details for the plan of care and counseling regarding the diagnosis and prognosis. Questions are answered at this time and they are agreeable with the plan.      ------------------------ IMPRESSION AND DISPOSITION -------------------------------    IMPRESSION  1. Acute upper respiratory infection    2.  Acute cystitis without hematuria        DISPOSITION  Disposition: Discharge to home  Patient condition is stable                   Romayne Pert, PA-C  09/01/22 1820

## 2022-09-01 NOTE — Clinical Note
Nikky Taylor was seen and treated in our emergency department on 9/1/2022. Covid testing today in ED is negative.      Sosa Freeman PA-C

## 2022-09-01 NOTE — ED NOTES
Pt verbalizes understanding of discharge instructions, medications and follow up.  Pt ambulatory out of ED, tariq, A&O X3     Lior Blackmon RN  09/01/22 0701

## 2022-09-03 LAB — URINE CULTURE, ROUTINE: NORMAL

## 2022-11-03 ENCOUNTER — HOSPITAL ENCOUNTER (OUTPATIENT)
Age: 22
Discharge: HOME | End: 2022-11-03
Payer: COMMERCIAL

## 2022-11-03 LAB
BASOPHILS ABSOLUTE: 0.03 E9/L (ref 0–0.2)
BASOPHILS RELATIVE PERCENT: 0.3 % (ref 0–2)
EOSINOPHILS ABSOLUTE: 0.07 E9/L (ref 0.05–0.5)
EOSINOPHILS RELATIVE PERCENT: 0.7 % (ref 0–6)
GLUCOSE TOLERANCE SCREEN 50G: 102 MG/DL (ref 70–140)
HCT VFR BLD CALC: 35.1 % (ref 34–48)
HEMOGLOBIN: 12 G/DL (ref 11.5–15.5)
IMMATURE GRANULOCYTES #: 0.11 E9/L
IMMATURE GRANULOCYTES %: 1 % (ref 0–5)
LYMPHOCYTES ABSOLUTE: 1.72 E9/L (ref 1.5–4)
LYMPHOCYTES RELATIVE PERCENT: 16.2 % (ref 20–42)
MCH RBC QN AUTO: 31.2 PG (ref 26–35)
MCHC RBC AUTO-ENTMCNC: 34.2 % (ref 32–34.5)
MCV RBC AUTO: 91.2 FL (ref 80–99.9)
MONOCYTES ABSOLUTE: 0.52 E9/L (ref 0.1–0.95)
MONOCYTES RELATIVE PERCENT: 4.9 % (ref 2–12)
NEUTROPHILS ABSOLUTE: 8.19 E9/L (ref 1.8–7.3)
NEUTROPHILS RELATIVE PERCENT: 76.9 % (ref 43–80)
PDW BLD-RTO: 13.7 FL (ref 11.5–15)
PLATELET # BLD: 172 E9/L (ref 130–450)
PMV BLD AUTO: 10.7 FL (ref 7–12)
RBC # BLD: 3.85 E12/L (ref 3.5–5.5)
WBC # BLD: 10.6 E9/L (ref 4.5–11.5)

## 2022-11-03 PROCEDURE — 85025 COMPLETE CBC W/AUTO DIFF WBC: CPT

## 2022-11-03 PROCEDURE — 36415 COLL VENOUS BLD VENIPUNCTURE: CPT

## 2022-11-03 PROCEDURE — 82950 GLUCOSE TEST: CPT

## 2022-11-09 ENCOUNTER — HOSPITAL ENCOUNTER (EMERGENCY)
Age: 22
Discharge: HOME | End: 2022-11-09
Payer: COMMERCIAL

## 2022-11-09 VITALS
DIASTOLIC BLOOD PRESSURE: 48 MMHG | TEMPERATURE: 99.2 F | HEART RATE: 104 BPM | WEIGHT: 160 LBS | BODY MASS INDEX: 29.26 KG/M2 | OXYGEN SATURATION: 99 % | RESPIRATION RATE: 16 BRPM | SYSTOLIC BLOOD PRESSURE: 97 MMHG

## 2022-11-09 DIAGNOSIS — R11.2 NAUSEA AND VOMITING, UNSPECIFIED VOMITING TYPE: Primary | ICD-10-CM

## 2022-11-09 DIAGNOSIS — U07.1 COVID-19 VIRUS DETECTED: ICD-10-CM

## 2022-11-09 LAB
AMORPHOUS: ABNORMAL
BACTERIA: ABNORMAL /HPF
BILIRUBIN URINE: NEGATIVE
BLOOD, URINE: NEGATIVE
CLARITY: CLEAR
COLOR: YELLOW
EPITHELIAL CELLS, UA: ABNORMAL /HPF
GLUCOSE URINE: NEGATIVE MG/DL
INFLUENZA A BY PCR: NOT DETECTED
INFLUENZA B BY PCR: NOT DETECTED
KETONES, URINE: >=80 MG/DL
LEUKOCYTE ESTERASE, URINE: NEGATIVE
MUCUS: PRESENT /LPF
NITRITE, URINE: NEGATIVE
PH UA: 6 (ref 5–9)
PROTEIN UA: ABNORMAL MG/DL
RBC UA: ABNORMAL /HPF (ref 0–2)
RENAL EPITHELIAL, UA: ABNORMAL /HPF
RSV BY PCR: NEGATIVE
SARS-COV-2, NAAT: DETECTED
SPECIFIC GRAVITY UA: >=1.03 (ref 1–1.03)
UROBILINOGEN, URINE: 1 E.U./DL
WBC UA: ABNORMAL /HPF (ref 0–5)

## 2022-11-09 PROCEDURE — 6370000000 HC RX 637 (ALT 250 FOR IP): Performed by: PHYSICIAN ASSISTANT

## 2022-11-09 PROCEDURE — 87635 SARS-COV-2 COVID-19 AMP PRB: CPT

## 2022-11-09 PROCEDURE — 81001 URINALYSIS AUTO W/SCOPE: CPT

## 2022-11-09 PROCEDURE — 87502 INFLUENZA DNA AMP PROBE: CPT

## 2022-11-09 PROCEDURE — 87088 URINE BACTERIA CULTURE: CPT

## 2022-11-09 PROCEDURE — 87807 RSV ASSAY W/OPTIC: CPT

## 2022-11-09 PROCEDURE — 2580000003 HC RX 258: Performed by: PHYSICIAN ASSISTANT

## 2022-11-09 PROCEDURE — 99284 EMERGENCY DEPT VISIT MOD MDM: CPT

## 2022-11-09 RX ORDER — 0.9 % SODIUM CHLORIDE 0.9 %
1000 INTRAVENOUS SOLUTION INTRAVENOUS ONCE
Status: COMPLETED | OUTPATIENT
Start: 2022-11-09 | End: 2022-11-09

## 2022-11-09 RX ORDER — ACETAMINOPHEN 500 MG
1000 TABLET ORAL ONCE
Status: COMPLETED | OUTPATIENT
Start: 2022-11-09 | End: 2022-11-09

## 2022-11-09 RX ADMIN — SODIUM CHLORIDE 1000 ML: 9 INJECTION, SOLUTION INTRAVENOUS at 14:46

## 2022-11-09 RX ADMIN — ACETAMINOPHEN 1000 MG: 500 TABLET ORAL at 13:08

## 2022-11-09 ASSESSMENT — PAIN SCALES - GENERAL: PAINLEVEL_OUTOF10: 4

## 2022-11-09 NOTE — ED NOTES
The following labs were labeled with appropriate pt sticker and tubed to lab:     [] Blue     [] Lavender   [] on ice  [] Green/yellow  [] Green/black [] on ice  [] Rosa Mick  [] on ice  [] Yellow  [] Red  [] Type/ Screen  [] ABG  [] VBG    [x] COVID-19 swab    [x] Rapid  [] PCR  [x] Flu swab  [x] Peds Viral Panel     [] Urine Sample  [] Pelvic Cultures  [] Blood Cultures  [] X 2  [] STREP Cultures         Semaj Monet RN  11/09/22 8888

## 2022-11-09 NOTE — ED PROVIDER NOTES
[address]  Department of Emergency Medicine   ED  Encounter Note  Admit Date/RoomTime: 2022  1:17 PM  ED Room: 40/40    NAME: Erick Richards  : 2000  MRN: [id number]     Chief Complaint:  Fatigue, Emesis, Fever (Starting last night fever and chills. Body aches. Emesis. 28 weeks pregnant ), and Back Pain (Lower back pain )    History of Present Illness       Sarah Jorgensen is a 23 y.o. old female who is a  at 25 weeks gestation who presents to the emergency department by private vehicle from [employer], for chills, new dry cough, new muscle aches, sore throat, and fatigue, which began 1 day(s) prior to arrival.  Since onset the symptoms have been persistent and worsening and moderate in severity. The symptoms are associated with nausea and vomiting. There has been no abdominal pain, dysuria, or OB complaints. Vaccination status against COVID-19 is not available at this time    ROS   Pertinent positives and negatives are stated within HPI, all other systems reviewed and are negative. Past Medical History:  has a past medical history of ADHD, Autism, Bipolar 1 disorder ([facility] [address] and PTSD (post-traumatic stress disorder). Surgical History:  has no past surgical history on file. Social History:  reports that she has been smoking cigarettes. She has been smoking an average of .5 packs per day. She has never used smokeless tobacco. She reports current drug use. Drug: Marijuana Yoon Rajesh). She reports that she does not drink alcohol. Family History: family history is not on file. Allergies: Shellfish-derived products and Fish-derived products    Physical Exam   Oxygen Saturation Interpretation: Normal on room air analysis.         ED Triage Vitals   BP Temp Temp src Heart Rate Resp SpO2 Height Weight   22 1316 22 1242 -- 22 1242 22 1242 22 1242 -- 22 1312   85/63 99.2 °F (37.3 °C)  (!) 133 20 99 % 160 lb (72.6 kg)         Constitutional:  Alert, development consistent with age. Ears:  External Ears: Bilateral normal.               TM's & External Canals: normal TM's and external ear canals bilaterally. Nose:   There is no discharge, swelling or lesions noted. Sinuses: no bilateral maxillary sinus tenderness. no bilateral frontal sinus tenderness. Mouth:  normal tongue and buccal mucosa. Throat: no erythema or exudates noted. Teeth and gums normal..  Airway patent. Neck:  Supple. No meningeal signs. There is no  anterior cervical and posterior cervical node tenderness. Respiratory:   Breath sounds: bilateral normal.  Lung sounds: normal.   CV:  Tachy  but Regular rate and rhythm, normal heart sounds, without pathological murmurs, ectopy, gallops, or rubs. GI:  Abdomen Soft, nontender, good bowel sounds. No firm or pulsatile mass. Integument:  Normal turgor. Warm, dry, without visible rash. Neurological:  Oriented. Motor functions intact. Lab / Imaging Results   (All laboratory and radiology results have been personally reviewed by myself)  Labs:  Results for orders placed or performed during the hospital encounter of 11/09/22   COVID-19, Rapid    Specimen: Nasopharyngeal Swab; Nasal   Result Value Ref Range    SARS-CoV-2, NAAT DETECTED (A) Not Detected   Rapid influenza A/B antigens    Specimen: Nasopharyngeal; Nose   Result Value Ref Range    Influenza A by PCR Not Detected Not Detected    Influenza B by PCR Not Detected Not Detected   Rapid RSV Antigen    Specimen: Nasopharyngeal Swab   Result Value Ref Range    RSV by PCR Negative Negative       Imaging: All Radiology results interpreted by Radiologist unless otherwise noted.   No orders to display       ED Course / Medical Decision Making     Medications   0.9 % sodium chloride bolus (0 mLs IntraVENous Stopped 11/9/22 1625)   acetaminophen (TYLENOL) tablet 1,000 mg (1,000 mg Oral Given 11/9/22 1308) Re-examination:  11/9/22       Time: 1700   Patients condition has improved and is currently asymptomatic. Patient received IV fluids in the ED, was able to drink and eat without any vomiting. Reexamination at 5:05 PM patient's vitals are stable. Heart rate 96. Blood pressure is 99/46. Patient states her blood pressure normally runs low. Abdomen is soft and nontender. No complaints this time. Patient is requesting to be discharged home. Consults:   None    Procedures:   None    Medical Decision Making:   Erick Richards presented to ED with complaints of Fatigue, Emesis, Fever (Starting last night fever and chills. Body aches. Emesis. 28 weeks pregnant ), and Back Pain (Lower back pain )    With high suspicion for COVID-19, testing was obtained and were positive. Based on history and examination findings of Sarah Meneses, the causative nature of illness is confirmed to be COVID-19 in etiology. Therefore, symptomatic control with supportive meds is considered appropriate at this time. She is not hypoxic. Patient is well appearing, non toxic, meets criteria for and is appropriate for outpatient management. Normal progression of disease discussed. Recommend quarantine for 8 days. Plan of Care/Counseling:  Ama Walker reviewed today's visit with the patient in addition to providing specific details for the plan of care and counseling regarding the diagnosis and prognosis. Questions are answered at this time and are agreeable with the plan. Assessment     1. Nausea and vomiting, unspecified vomiting type    2. COVID-19 virus detected      Plan   Discharged back to rehabilitation center. Patient condition is stable    New Medications     New Prescriptions    No medications on file     Electronically signed by PETER Walker   DD: 11/9/22  **This report was transcribed using voice recognition software.  Every effort was made to ensure accuracy; however, inadvertent computerized transcription errors may be present.   END OF ED PROVIDER NOTE         Ama Lopez  11/09/22 6440

## 2022-11-09 NOTE — DISCHARGE INSTRUCTIONS
Pregnancy and Medications  Most of the time, medicine a pregnant woman is taking does not enter the fetus, but sometimes it can. This may cause damage or birth defects. The risk of damage being done to a fetus is the greatest in the first few weeks of pregnancy. This is when major organs are developing. If you are taking any prescription, over-the-counter or herbal medicines, it is best to talk to your caregiver about the medications you are taking before getting pregnant. If you become pregnant, stop taking over-the-counter and herbal medications right away. Tell your caregiver what you were taking. Also, tell him/her medications, if any, you took before knowing you were pregnant. Never take any drugs during pregnancy unless your caregiver gives you permission. Other things like caffeine, vitamins, herbal teas and remedies can affect the growing fetus. Talk to your caregiver about cutting down on caffeine. Also, ask what type of vitamins you need to take. Never use any herbal product without talking to your caregiver first.   MEDICINES THAT ARE NOT SAFE TO TAKE DURING PREGNANCY   Category A - drugs that have been tested for safety during pregnancy and have been found to be safe. This includes drugs such as:  Folic acid. Vitamin B6. Thyroid medicine in moderation or in prescribed doses. Category B - drugs that have been used a lot during pregnancy and do not appear to cause major birth defects or other problems. This includes drugs such as:  Some antibiotics. Acetaminophen (Tylenol®). Aspartame (artificial sweetener). Famotidine (Pepcid®). Prednisone (cortisone). Insulin (for diabetes). Ibuprofen (Macie Antis) before the third trimester. Pregnant women should not take ibuprofen during the last three months of pregnancy. Category C - drugs that are more likely to cause problems for the mother or fetus. It also includes drugs for which safety studies have not been finished.  The majority of these drugs do not have safety studies in progress. These drugs often come with a warning that they should be used only if the benefits of taking them outweigh the risks. This is something a woman would need to carefully discuss with her caregiver. These drugs include:  Prochlorperzaine (Compazine®). Sudafed®. Fluconazole (Diflucan®). Ciprofloxacin (Cipro®). Some antidepressants are also included in this group. Category D - drugs that have clear health risks for the fetus. They include:  Alcohol. Lithium (used to treat manic depression). Phenytoin (Dilantin®). Most chemotherapy drugs to treat cancer. In some cases, chemotherapy drugs are given during pregnancy. Category X - drugs that have been shown to cause birth defects. They should never be taken during pregnancy. These include:  Drugs to treat skin conditions like cystic acne (Accutane®) and psoriasis (Tegison® or Soriatane®). Thalidomide (a sedative). Diethylstilbestrol or HINA. No medication is considered 100% safe to take when pregnant because everyone reacts to drugs differently. Aspirin and other drugs containing salicylate are not recommended during pregnancy, especially during the last three months because it can cause bleeding. In rare cases, a woman's caregiver may want her to use these types of drugs under close watch. Acetylsalicylate, a common ingredient in many over-the-counter painkillers, may make a pregnancy last longer. It may cause severe bleeding before and after delivery. SHOULD I AVOID TAKING ANY MEDICINE WHILE I AM PREGNANT? Whether or not you should continue taking medicine during pregnancy is a serious question. However, if you stop taking medicine that you need, this could harm both you and your baby. Talk to your caregiver about if the benefits outweigh the risk for you and your baby.    Pregnant and nursing women who need medication for psychiatric conditions should consult with their obstetrician, pediatrician and mental health care provider before taking any medication. NATURAL MEDICATIONS OR HERBAL REMEDIES WHEN YOU ARE PREGNANT  While some herbal remedies claim they will help with pregnancy, there are no studies to prove these claims are true. Likewise, there are very few studies to look at how safe and effective herbal remedies are during pregnancy. Do not take any herbal products without talking to your caregiver first. These products may contain agents that could harm you and the growing fetus. They could cause problems with your pregnancy. If you think or know that your mother took diethylstilbesterol (HINA), a factory made estrogen, when she was pregnant with you, talk with your caregiver right away. Ask her or him about:  What types of tests you may need. How often they need to be done. Anything else you may need to do to make sure you do not develop any problems. A woman whose mother was given HINA when pregnant should be followed and screened for abnormalities of her female organs all through her life. OVER-THE-COUNTER MEDICATIONS THAT ARE SAFE TO TAKE DURING PREGNANCY:  Any medication taken during pregnancy should be taken only with the permission of your caregiver. Allergy Cherylynn Pulse). Cold and Flu, Tylenol (acetaminophen). Tylenol Cold, warm salt water gargles, saline nasal drops. Constipation (Metamucil®, Citrucil®, Fiberall/Fobricon, Colace®, Milk of Magnesia® , Senekot®). Diarrhea (Kaopectate®, Immodium®, Parepectolin). You should not take these in the first trimester and only take the medication for 24 hours. Call your caregiver if you still have the diarrhea. Headache (Tylenol). Ointment for cuts and scrapes (J & J, Bacitracin®, Neosporin®). Heartburn (Tums®, Riopan®, titralac , Gaviscon). Hemorrhoids (Preparation H®, anusol, tucks®, Witch hazel). Nausea and vomiting (Vitamin B6 100mg tablets, emetrol if you are not diabetic, Emetrex, sea bands).   Rashes (hydrocortisone cream or ointment, caladryl lotion or cream, benadryl cream or capsules, oatmeal bath). Yeast infection of the vagina (Monistat® cream or tablets, Terazol® cream). FOR MORE INFORMATION  For more information regarding the medication you are taking and how it affects your pregnancy, go to Physicians Drug Reference link: https://kiki.[employer]. com/drug_information. html  *Some information based on studies from The Food and Drug Administration (FDA).

## 2022-11-11 LAB — URINE CULTURE, ROUTINE: NORMAL

## 2022-11-16 ENCOUNTER — HOSPITAL ENCOUNTER (OUTPATIENT)
Age: 22
Discharge: HOME | End: 2022-11-16
Payer: COMMERCIAL

## 2022-11-16 LAB
ABO/RH: NORMAL
ANTIBODY SCREEN: NORMAL
RHIG LOT NUMBER: NORMAL

## 2022-11-16 PROCEDURE — 36415 COLL VENOUS BLD VENIPUNCTURE: CPT

## 2022-11-16 PROCEDURE — 6360000002 HC RX W HCPCS: Performed by: OBSTETRICS & GYNECOLOGY

## 2022-11-16 PROCEDURE — 86850 RBC ANTIBODY SCREEN: CPT

## 2022-11-16 PROCEDURE — 86900 BLOOD TYPING SEROLOGIC ABO: CPT

## 2022-11-16 PROCEDURE — 96372 THER/PROPH/DIAG INJ SC/IM: CPT

## 2022-11-16 PROCEDURE — 86901 BLOOD TYPING SEROLOGIC RH(D): CPT

## 2022-11-16 RX ADMIN — HUMAN RHO(D) IMMUNE GLOBULIN 300 MCG: 300 INJECTION, SOLUTION INTRAMUSCULAR at 17:09

## 2022-11-16 NOTE — PROGRESS NOTES
Patient arrived from lab for rhogam injection. Vitals done and rhogam verified with two RNs. /64 hr 85 temp 98, resp 16.

## 2022-12-28 ENCOUNTER — HOSPITAL ENCOUNTER (OUTPATIENT)
Age: 22
Discharge: HOME | End: 2022-12-28
Attending: OBSTETRICS & GYNECOLOGY | Admitting: OBSTETRICS & GYNECOLOGY
Payer: COMMERCIAL

## 2022-12-28 PROBLEM — Z3A.36 36 WEEKS GESTATION OF PREGNANCY: Status: ACTIVE | Noted: 2022-12-28

## 2022-12-28 PROCEDURE — 99202 OFFICE O/P NEW SF 15 MIN: CPT

## 2022-12-28 RX ORDER — SERTRALINE HYDROCHLORIDE 100 MG/1
150 TABLET, FILM COATED ORAL
COMMUNITY
Start: 2022-12-12

## 2022-12-29 NOTE — PROGRESS NOTES
G 2 p1 states was in office today and was 2 cms. Patient c/o spotting, cramping and pelvic pressure since 5 pm this afternoon. Edc 1/1/23.   Patient perceives good fetal movement

## 2022-12-29 NOTE — DISCHARGE INSTRUCTIONS
Home Undelivered Discharge Instructions    After Discharge Orders:                Diet:  normal diet as tolerated    Rest: normal activity as tolerated    Other instructions: Do kick counts once a day on your baby. Choose the time of day your baby is most active. Get in a comfortable lying or sitting position and time how long it takes to feel 10 kicks, twists, turns, swishes, or rolls.  Call your physician or midwife if there have not been 10 kicks in 2 hours    Call physician or midwife, return to Labor and Delivery, call 911, or go to the nearest Emergency Room if: decreased fetal movement, persistent low back pain or cramping, or bleeding from vaginal area         2

## 2022-12-29 NOTE — PROGRESS NOTES
[facility] NOTIFIED OF ARRIVAL AND C/O. Instructed to check cervix and call back

## 2022-12-29 NOTE — PROGRESS NOTES
Discharge instructions given. Patient verbalizes understanding.   Patient discharged ambulatory to exit in stable condition

## 2023-01-01 ENCOUNTER — HOSPITAL ENCOUNTER (OUTPATIENT)
Age: 23
Discharge: HOME | End: 2023-01-01
Attending: OBSTETRICS & GYNECOLOGY | Admitting: OBSTETRICS & GYNECOLOGY
Payer: COMMERCIAL

## 2023-01-01 VITALS
TEMPERATURE: 98.2 F | RESPIRATION RATE: 16 BRPM | HEART RATE: 109 BPM | SYSTOLIC BLOOD PRESSURE: 113 MMHG | DIASTOLIC BLOOD PRESSURE: 62 MMHG

## 2023-01-01 PROBLEM — O26.93 COMPLICATED PREGNANCY, THIRD TRIMESTER: Status: ACTIVE | Noted: 2023-01-01

## 2023-01-01 LAB
AMNISURE, POC: NEGATIVE
Lab: NORMAL
NEGATIVE QC PASS/FAIL: NORMAL
POSITIVE QC PASS/FAIL: NORMAL

## 2023-01-01 PROCEDURE — 84112 EVAL AMNIOTIC FLUID PROTEIN: CPT

## 2023-01-01 PROCEDURE — 99211 OFF/OP EST MAY X REQ PHY/QHP: CPT

## 2023-01-02 NOTE — DISCHARGE INSTRUCTIONS
Home Undelivered Discharge Instructions    After Discharge Orders:    Keep all OB appointments with . Drink plenty of water to stay hydrated and rest when possible.                Diet:  normal diet as tolerated    Rest: normal activity as tolerated        Call physician or midwife, return to Labor and Delivery, call 911, or go to the nearest Emergency Room if: increased leakage or fluid, contractions more than  8 per  1 hour, decreased fetal movement, persistent low back pain or cramping, bleeding from vaginal area, difficulty urinating, pain with urination, difficulty breathing, new calf pain, persistent headache, or vision change

## 2023-01-02 NOTE — PROGRESS NOTES
Ambulated to L&D with c/o loss of mucous plug, and leaking some fluid.  at 35 4/7 weeks. Pt states she perceives fetal movement, denies contractions and bleeding.

## 2023-01-02 NOTE — PROGRESS NOTES
called and notified of pt arrival and complaints. Reviewed reactive EFM tracing, rare contraction and mild irritability. Amnisure negative and no change in SVE from previous exam. Hailey Fu for discharge home.

## 2023-01-10 ENCOUNTER — HOSPITAL ENCOUNTER (OUTPATIENT)
Age: 23
Discharge: OTHER INSTITUTION | End: 2023-01-10
Attending: OBSTETRICS & GYNECOLOGY | Admitting: OBSTETRICS & GYNECOLOGY
Payer: COMMERCIAL

## 2023-01-10 VITALS
TEMPERATURE: 98.2 F | DIASTOLIC BLOOD PRESSURE: 65 MMHG | WEIGHT: 173 LBS | SYSTOLIC BLOOD PRESSURE: 107 MMHG | OXYGEN SATURATION: 98 % | HEART RATE: 96 BPM | RESPIRATION RATE: 14 BRPM | HEIGHT: 62 IN | BODY MASS INDEX: 31.83 KG/M2

## 2023-01-10 PROBLEM — O26.93 PREGNANCY COMPLICATION, THIRD TRIMESTER: Status: ACTIVE | Noted: 2023-01-10

## 2023-01-10 LAB
AMNISURE, POC: NEGATIVE
AMNISURE, POC: NEGATIVE
BACTERIA: ABNORMAL /HPF
BILIRUBIN URINE: NEGATIVE
BLOOD, URINE: NEGATIVE
CLARITY: CLEAR
COLOR: YELLOW
EPITHELIAL CELLS, UA: ABNORMAL /HPF
GLUCOSE URINE: NEGATIVE MG/DL
KETONES, URINE: NEGATIVE MG/DL
LEUKOCYTE ESTERASE, URINE: ABNORMAL
Lab: NORMAL
Lab: NORMAL
NEGATIVE QC PASS/FAIL: NORMAL
NEGATIVE QC PASS/FAIL: NORMAL
NITRITE, URINE: NEGATIVE
PH UA: 6.5 (ref 5–9)
POSITIVE QC PASS/FAIL: NORMAL
POSITIVE QC PASS/FAIL: NORMAL
PROTEIN UA: NEGATIVE MG/DL
RBC UA: ABNORMAL /HPF (ref 0–2)
SPECIFIC GRAVITY UA: 1.01 (ref 1–1.03)
UROBILINOGEN, URINE: 1 E.U./DL
WBC UA: ABNORMAL /HPF (ref 0–5)

## 2023-01-10 PROCEDURE — 81001 URINALYSIS AUTO W/SCOPE: CPT

## 2023-01-10 PROCEDURE — 99212 OFFICE O/P EST SF 10 MIN: CPT

## 2023-01-10 NOTE — PROGRESS NOTES
Dr. Maria Eugenia Young bedside with the patient to discuss care plan and assessment of the patients status. Pt ok for discharge.

## 2023-01-10 NOTE — PROGRESS NOTES
[facility] 23 36.6 week gestation arrived to unit via ambulance service on stretcher from [facility] with c/o gush and leaking at 0505 clear fluid, awakening from sleep. Pt reports mild back discomfort and good fetal movement. Pt denies vaginal bleeding. Pt oriented to LD3, EFM established fetal well being. RN remains bedside assessing.

## 2023-01-10 NOTE — PROGRESS NOTES
Dr. Lon Diaz called for an update on the patient. Pt c/o pressure in her vagina and rectum. This nurse performed cervical check pt 2 cm. Orders received to keep pt on observation, Dr. Lon Diaz will be coming in to assess the patient.

## 2023-01-10 NOTE — PROGRESS NOTES
Dr. Lani Davis called with patient arrival, complaints and results. New orders received to monitor for labor, and call with update later, general diet approved.

## 2023-01-10 NOTE — PROGRESS NOTES
Pt sister requested a work excuse. Pt asking for a bedrest excuse because she does not like to partake in group exercise at her residence. Pt told to follow up with that matter with Dr. Lani Davis. Pt educated on health benefits of exercise since this is something she has been partaking in all along during her pregnancy.

## 2023-01-10 NOTE — PROGRESS NOTES
Pt given discharge instructions and education. Pt has a follow up appointment with Jake Singer on Thursday. Pt verbalized understanding.

## 2023-01-10 NOTE — PROGRESS NOTES
Pt called out to nurse station c/o of feeling wet discharge from her vagina. Amnisure performed results negative. Cervical check pt is 2 cm.

## 2023-01-20 ENCOUNTER — HOSPITAL ENCOUNTER (INPATIENT)
Age: 23
LOS: 1 days | Discharge: HOME | DRG: 566 | End: 2023-01-20
Attending: OBSTETRICS & GYNECOLOGY | Admitting: OBSTETRICS & GYNECOLOGY
Payer: COMMERCIAL

## 2023-01-20 VITALS
HEART RATE: 94 BPM | OXYGEN SATURATION: 99 % | SYSTOLIC BLOOD PRESSURE: 110 MMHG | RESPIRATION RATE: 15 BRPM | TEMPERATURE: 98.4 F | DIASTOLIC BLOOD PRESSURE: 68 MMHG

## 2023-01-20 PROBLEM — Z34.90 TERM PREGNANCY: Status: ACTIVE | Noted: 2023-01-20

## 2023-01-20 LAB
ABO/RH: NORMAL
AMNISURE, POC: NEGATIVE
AMPHETAMINE SCREEN, URINE: NOT DETECTED
ANION GAP SERPL CALCULATED.3IONS-SCNC: 14 MMOL/L (ref 7–16)
ANTIBODY SCREEN: NORMAL
BARBITURATE SCREEN URINE: NOT DETECTED
BENZODIAZEPINE SCREEN, URINE: NOT DETECTED
BUN BLDV-MCNC: 11 MG/DL (ref 6–20)
CALCIUM SERPL-MCNC: 9 MG/DL (ref 8.6–10.2)
CANNABINOID SCREEN URINE: POSITIVE
CHLORIDE BLD-SCNC: 100 MMOL/L (ref 98–107)
CO2: 21 MMOL/L (ref 22–29)
COCAINE METABOLITE SCREEN URINE: NOT DETECTED
CREAT SERPL-MCNC: 0.4 MG/DL (ref 0.5–1)
FENTANYL SCREEN, URINE: NOT DETECTED
GFR SERPL CREATININE-BSD FRML MDRD: >60 ML/MIN/1.73
GLUCOSE BLD-MCNC: 99 MG/DL (ref 74–99)
HCT VFR BLD CALC: 32.8 % (ref 34–48)
HEMOGLOBIN: 10.4 G/DL (ref 11.5–15.5)
Lab: ABNORMAL
Lab: NORMAL
MCH RBC QN AUTO: 27.4 PG (ref 26–35)
MCHC RBC AUTO-ENTMCNC: 31.7 % (ref 32–34.5)
MCV RBC AUTO: 86.5 FL (ref 80–99.9)
METHADONE SCREEN, URINE: NOT DETECTED
NEGATIVE QC PASS/FAIL: NORMAL
OPIATE SCREEN URINE: NOT DETECTED
OXYCODONE URINE: NOT DETECTED
PDW BLD-RTO: 13.3 FL (ref 11.5–15)
PHENCYCLIDINE SCREEN URINE: NOT DETECTED
PLATELET # BLD: 193 E9/L (ref 130–450)
PMV BLD AUTO: 10.6 FL (ref 7–12)
POSITIVE QC PASS/FAIL: NORMAL
POTASSIUM SERPL-SCNC: 3.9 MMOL/L (ref 3.5–5)
RBC # BLD: 3.79 E12/L (ref 3.5–5.5)
SODIUM BLD-SCNC: 135 MMOL/L (ref 132–146)
WBC # BLD: 17.2 E9/L (ref 4.5–11.5)

## 2023-01-20 PROCEDURE — 2580000003 HC RX 258: Performed by: OBSTETRICS & GYNECOLOGY

## 2023-01-20 PROCEDURE — 86850 RBC ANTIBODY SCREEN: CPT

## 2023-01-20 PROCEDURE — 85027 COMPLETE CBC AUTOMATED: CPT

## 2023-01-20 PROCEDURE — 86901 BLOOD TYPING SEROLOGIC RH(D): CPT

## 2023-01-20 PROCEDURE — G0480 DRUG TEST DEF 1-7 CLASSES: HCPCS

## 2023-01-20 PROCEDURE — G0378 HOSPITAL OBSERVATION PER HR: HCPCS

## 2023-01-20 PROCEDURE — 84112 EVAL AMNIOTIC FLUID PROTEIN: CPT

## 2023-01-20 PROCEDURE — 36415 COLL VENOUS BLD VENIPUNCTURE: CPT

## 2023-01-20 PROCEDURE — 86900 BLOOD TYPING SEROLOGIC ABO: CPT

## 2023-01-20 PROCEDURE — 80307 DRUG TEST PRSMV CHEM ANLYZR: CPT

## 2023-01-20 PROCEDURE — 1220000001 HC SEMI PRIVATE L&D R&B

## 2023-01-20 PROCEDURE — 80048 BASIC METABOLIC PNL TOTAL CA: CPT

## 2023-01-20 RX ORDER — SODIUM CHLORIDE, SODIUM LACTATE, POTASSIUM CHLORIDE, CALCIUM CHLORIDE 600; 310; 30; 20 MG/100ML; MG/100ML; MG/100ML; MG/100ML
INJECTION, SOLUTION INTRAVENOUS CONTINUOUS
Status: DISCONTINUED | OUTPATIENT
Start: 2023-01-20 | End: 2023-01-21 | Stop reason: HOSPADM

## 2023-01-20 RX ORDER — SODIUM CHLORIDE 0.9 % (FLUSH) 0.9 %
5-40 SYRINGE (ML) INJECTION PRN
Status: DISCONTINUED | OUTPATIENT
Start: 2023-01-20 | End: 2023-01-21 | Stop reason: HOSPADM

## 2023-01-20 RX ORDER — SODIUM CHLORIDE 9 MG/ML
25 INJECTION, SOLUTION INTRAVENOUS PRN
Status: DISCONTINUED | OUTPATIENT
Start: 2023-01-20 | End: 2023-01-21 | Stop reason: HOSPADM

## 2023-01-20 RX ORDER — SODIUM CHLORIDE, SODIUM LACTATE, POTASSIUM CHLORIDE, AND CALCIUM CHLORIDE .6; .31; .03; .02 G/100ML; G/100ML; G/100ML; G/100ML
500 INJECTION, SOLUTION INTRAVENOUS PRN
Status: DISCONTINUED | OUTPATIENT
Start: 2023-01-20 | End: 2023-01-21 | Stop reason: HOSPADM

## 2023-01-20 RX ORDER — CARBOPROST TROMETHAMINE 250 UG/ML
250 INJECTION, SOLUTION INTRAMUSCULAR PRN
Status: DISCONTINUED | OUTPATIENT
Start: 2023-01-20 | End: 2023-01-21 | Stop reason: HOSPADM

## 2023-01-20 RX ORDER — NALBUPHINE HCL 10 MG/ML
10 AMPUL (ML) INJECTION
Status: DISCONTINUED | OUTPATIENT
Start: 2023-01-20 | End: 2023-01-21 | Stop reason: HOSPADM

## 2023-01-20 RX ORDER — SODIUM CHLORIDE 0.9 % (FLUSH) 0.9 %
5-40 SYRINGE (ML) INJECTION EVERY 12 HOURS SCHEDULED
Status: DISCONTINUED | OUTPATIENT
Start: 2023-01-20 | End: 2023-01-21 | Stop reason: HOSPADM

## 2023-01-20 RX ORDER — ONDANSETRON 2 MG/ML
4 INJECTION INTRAMUSCULAR; INTRAVENOUS EVERY 6 HOURS PRN
Status: DISCONTINUED | OUTPATIENT
Start: 2023-01-20 | End: 2023-01-21 | Stop reason: HOSPADM

## 2023-01-20 RX ORDER — DOCUSATE SODIUM 100 MG/1
100 CAPSULE, LIQUID FILLED ORAL 2 TIMES DAILY
Status: DISCONTINUED | OUTPATIENT
Start: 2023-01-20 | End: 2023-01-21 | Stop reason: HOSPADM

## 2023-01-20 RX ORDER — MISOPROSTOL 200 UG/1
800 TABLET ORAL PRN
Status: DISCONTINUED | OUTPATIENT
Start: 2023-01-20 | End: 2023-01-21 | Stop reason: HOSPADM

## 2023-01-20 RX ORDER — SODIUM CHLORIDE, SODIUM LACTATE, POTASSIUM CHLORIDE, AND CALCIUM CHLORIDE .6; .31; .03; .02 G/100ML; G/100ML; G/100ML; G/100ML
1000 INJECTION, SOLUTION INTRAVENOUS PRN
Status: DISCONTINUED | OUTPATIENT
Start: 2023-01-20 | End: 2023-01-21 | Stop reason: HOSPADM

## 2023-01-20 RX ORDER — METHYLERGONOVINE MALEATE 0.2 MG/ML
200 INJECTION INTRAVENOUS PRN
Status: DISCONTINUED | OUTPATIENT
Start: 2023-01-20 | End: 2023-01-21 | Stop reason: HOSPADM

## 2023-01-20 RX ADMIN — SODIUM CHLORIDE, POTASSIUM CHLORIDE, SODIUM LACTATE AND CALCIUM CHLORIDE: 600; 310; 30; 20 INJECTION, SOLUTION INTRAVENOUS at 18:50

## 2023-01-20 ASSESSMENT — PAIN DESCRIPTION - LOCATION: LOCATION: BACK

## 2023-01-20 ASSESSMENT — PAIN SCALES - GENERAL
PAINLEVEL_OUTOF10: 4
PAINLEVEL_OUTOF10: 0

## 2023-01-20 NOTE — PROGRESS NOTES
Pt arrived on floor c/o contractions increasing in intensity over the last 30 minutes. Eliana @ bedside SVE performed.

## 2023-01-21 NOTE — DISCHARGE INSTRUCTIONS
Home Undelivered Discharge Instructions    After Discharge Orders:    Future Appointments   Date Time Provider Cain Seymouri   1/25/2023  6:00 AM JUDIE L&D INDUCTION OF LABOR [facility] [place]       Call physician or midwife's office on *** for instructions. Diet:  normal diet as tolerated    Rest: normal activity as tolerated    Other instructions: Do kick counts once a day on your baby. Choose the time of day your baby is most active. Get in a comfortable lying or sitting position and time how long it takes to feel 10 kicks, twists, turns, swishes, or rolls.  Call your physician or midwife if there have not been 10 kicks in 1 hours    Call physician or midwife, return to Labor and Delivery, call 911, or go to the nearest Emergency Room if: increased leakage or fluid, contractions more than  10 per  30 minutes, decreased fetal movement, persistent low back pain or cramping, bleeding from vaginal area, difficulty urinating, pain with urination, difficulty breathing, new calf pain, persistent headache, or vision change

## 2023-01-21 NOTE — PROGRESS NOTES
Neema Phillips called with vaginal exam , fetal tracing and patient update. Orders received see new orders.

## 2023-01-21 NOTE — H&P
Department of Obstetrics and Gynecology   [facility]  History and Physical        CHIEF COMPLAINT:  abdominal pain       HISTORY OF PRESENT ILLNESS:    The patient is a 23 y.o. female , No LMP recorded. Patient is pregnant. ,  at 38w2d. OB History          2    Para   1    Term   1            AB        Living   1         SAB        IAB        Ectopic        Molar        Multiple        Live Births   1            Patient presents in wheelchair chair stating she is in labor. Pt reports abdominal pain. Denies VB LOF reports FM. Estimated Due Date: Estimated Date of Delivery: 23    PRENATAL CARE:    Complicated by:   Patient Active Problem List   Diagnosis Code    Depression affecting pregnancy O99.340, F32. A    Depression with suicidal ideation F32. A, R45.851    Bipolar disorder, curr episode mixed, severe, w/o psychotic features ([facility] [address] F31.63    36 weeks gestation of pregnancy B3J.41    Complicated pregnancy, third trimester O26.93    Pregnancy complication, third trimester O26.93    Term pregnancy Z34.90       PAST OB HISTORY  OB History          2    Para   1    Term   1            AB        Living   1         SAB        IAB        Ectopic        Molar        Multiple        Live Births   1                Past Medical History:        Diagnosis Date    ADHD     Autism     Bipolar 1 disorder ([facility] [address]     PTSD (post-traumatic stress disorder)      Past Surgical History:    No past surgical history on file. Social History:    TOBACCO:   reports that she has quit smoking. She has been exposed to tobacco smoke. She has never used smokeless tobacco.  ETOH:   reports no history of alcohol use. DRUGS:   reports that she does not currently use drugs. Family History:   No family history on file.   Medications Prior to Admission:  Medications Prior to Admission: sertraline (ZOLOFT) 100 MG tablet, 150 mg  sertraline (ZOLOFT) 50 MG tablet,   Prenatal Vit-Fe Fumarate-FA (PRENATAL 1+1 PO), Take by mouth  Allergies:  Shellfish-derived products and Fish-derived products    Review of Systems:   Ears, nose, mouth, throat, and face: negative  Respiratory: negative  Cardiovascular: negative  Gastrointestinal: negative  Genitourinary:negative  Integument/breast: negative  Hematologic/lymphatic: negative  Musculoskeletal:negative  Neurological: negative  Behavioral/Psych: negative  Endocrine: negative  Allergic/Immunologic: negative  Psychosocial: negative    PHYSICAL EXAM:    General appearance:  awake, alert, cooperative, no apparent distress, and appears stated age  Neurologic:  Awake, alert, oriented to name, place and time. Cranial nerves II-XII are grossly intact. and gait is normal.  Lungs:  No increased work of breathing, good air exchange,  Heart:  regular rate and rhythm   Abdomen:  Gravid with normal bowel sounds, soft, non-distended, non-tender, no masses palpated  Pelvis:  External Genitalia: General appearance; normal, Hair distribution; normal, Lesions absent.     Fetal heart rate: Category 1   Cervix:    DILATION:  2  EFFACEMENT:   80  STATION:  -2      Contraction frequency:  Occasional   Membranes:  Intact    /78   Pulse (!) 107   Temp 98 °F (36.7 °C)   Resp 16   SpO2 98%     General Labs:    Lab Results   Component Value Date    WBC 17.2 (H) 01/20/2023    HGB 10.4 (L) 01/20/2023    HCT 32.8 (L) 01/20/2023    MCV 86.5 01/20/2023     01/20/2023      Lab Results   Component Value Date     01/20/2023    K 3.9 01/20/2023     01/20/2023    CO2 21 (L) 01/20/2023    BUN 11 01/20/2023    CREATININE 0.4 (L) 01/20/2023    GLUCOSE 99 01/20/2023    CALCIUM 9.0 01/20/2023    PROT 6.8 08/12/2020    LABALBU 3.9 08/12/2020    BILITOT 0.3 08/12/2020    ALKPHOS 63 08/12/2020    AST 14 08/12/2020    ALT 8 08/12/2020    LABGLOM >60 01/20/2023    GFRAA >60 06/09/2021       Prenatal labs:  - HIV: negative  - HBsAg: negative  - Hepatits C: negative  - RPR: positive  FTA reactive RPR TITER 1:2  - Chlamydia: negative  - GC: negative  - Rubella: immune  - GBS: negative  - UDS: POS   - Other screenings: [address] POS 22 and 22 NEG 10-25-22 and 22    ASSESSMENT AND PLAN:    IUP  38     FHR category 1   Abdominal pain   History substance abuse   Smoker   Bipolar     EFM   LAbs   Recheck cervix 2 hours    Electronically signed by MONICA Jimenez CNM on 2023 at 7:47 PM

## 2023-01-23 LAB
COMMENT: NORMAL
INTEGRITY CHECK, CREATININE, URINE: 94.9
INTEGRITY CHECK, OXIDANT, URINE: <40
INTEGRITY CHECK, PH, URINE: 6.9 (ref 4.5–9)
INTEGRITY CHECK, SPECIFIC GRAVITY, URINE: 1.02 (ref 1–1.03)
INTEGRITY CHECK, SPECIMEN INTEGRITY, URINE: NORMAL
THC NORMALIZED, QUANTITIATIVE, URINE: NORMAL
THC-COOH, QUANTITATIVE, URINE: >1000

## 2023-01-25 ENCOUNTER — APPOINTMENT (OUTPATIENT)
Dept: LABOR AND DELIVERY | Age: 23
End: 2023-01-25
Payer: COMMERCIAL

## 2023-01-25 ENCOUNTER — ANESTHESIA EVENT (OUTPATIENT)
Dept: LABOR AND DELIVERY | Age: 23
End: 2023-01-25
Payer: COMMERCIAL

## 2023-01-25 ENCOUNTER — ANESTHESIA (OUTPATIENT)
Dept: LABOR AND DELIVERY | Age: 23
End: 2023-01-25
Payer: COMMERCIAL

## 2023-01-25 ENCOUNTER — HOSPITAL ENCOUNTER (INPATIENT)
Age: 23
LOS: 2 days | Discharge: HOME | End: 2023-01-27
Attending: OBSTETRICS & GYNECOLOGY | Admitting: OBSTETRICS & GYNECOLOGY
Payer: COMMERCIAL

## 2023-01-25 LAB
ABO/RH: NORMAL
ALBUMIN SERPL-MCNC: 3.4 G/DL (ref 3.5–5.2)
ALP BLD-CCNC: 149 U/L (ref 35–104)
ALT SERPL-CCNC: 11 U/L (ref 0–32)
AMPHETAMINE SCREEN, URINE: NOT DETECTED
ANION GAP SERPL CALCULATED.3IONS-SCNC: 15 MMOL/L (ref 7–16)
ANTIBODY SCREEN: NORMAL
AST SERPL-CCNC: 24 U/L (ref 0–31)
BARBITURATE SCREEN URINE: NOT DETECTED
BENZODIAZEPINE SCREEN, URINE: NOT DETECTED
BILIRUB SERPL-MCNC: 0.2 MG/DL (ref 0–1.2)
BUN BLDV-MCNC: 8 MG/DL (ref 6–20)
CALCIUM SERPL-MCNC: 9.3 MG/DL (ref 8.6–10.2)
CANNABINOID SCREEN URINE: POSITIVE
CHLORIDE BLD-SCNC: 96 MMOL/L (ref 98–107)
CO2: 21 MMOL/L (ref 22–29)
COCAINE METABOLITE SCREEN URINE: NOT DETECTED
CREAT SERPL-MCNC: 0.4 MG/DL (ref 0.5–1)
FENTANYL SCREEN, URINE: NOT DETECTED
GFR SERPL CREATININE-BSD FRML MDRD: >60 ML/MIN/1.73
GLUCOSE BLD-MCNC: 95 MG/DL (ref 74–99)
Lab: ABNORMAL
METHADONE SCREEN, URINE: NOT DETECTED
OPIATE SCREEN URINE: NOT DETECTED
OXYCODONE URINE: NOT DETECTED
PHENCYCLIDINE SCREEN URINE: NOT DETECTED
POTASSIUM SERPL-SCNC: 4.6 MMOL/L (ref 3.5–5)
SODIUM BLD-SCNC: 132 MMOL/L (ref 132–146)
TOTAL PROTEIN: 6.6 G/DL (ref 6.4–8.3)

## 2023-01-25 PROCEDURE — 2580000003 HC RX 258: Performed by: ANESTHESIOLOGY

## 2023-01-25 PROCEDURE — 80053 COMPREHEN METABOLIC PANEL: CPT

## 2023-01-25 PROCEDURE — 1220000001 HC SEMI PRIVATE L&D R&B

## 2023-01-25 PROCEDURE — 2500000003 HC RX 250 WO HCPCS: Performed by: ANESTHESIOLOGY

## 2023-01-25 PROCEDURE — 36415 COLL VENOUS BLD VENIPUNCTURE: CPT

## 2023-01-25 PROCEDURE — 6370000000 HC RX 637 (ALT 250 FOR IP): Performed by: OBSTETRICS & GYNECOLOGY

## 2023-01-25 PROCEDURE — 80307 DRUG TEST PRSMV CHEM ANLYZR: CPT

## 2023-01-25 PROCEDURE — 3E033VJ INTRODUCTION OF OTHER HORMONE INTO PERIPHERAL VEIN, PERCUTANEOUS APPROACH: ICD-10-PCS | Performed by: OBSTETRICS & GYNECOLOGY

## 2023-01-25 PROCEDURE — 3700000025 EPIDURAL BLOCK: Performed by: ANESTHESIOLOGY

## 2023-01-25 PROCEDURE — 10907ZC DRAINAGE OF AMNIOTIC FLUID, THERAPEUTIC FROM PRODUCTS OF CONCEPTION, VIA NATURAL OR ARTIFICIAL OPENING: ICD-10-PCS | Performed by: OBSTETRICS & GYNECOLOGY

## 2023-01-25 PROCEDURE — 86901 BLOOD TYPING SEROLOGIC RH(D): CPT

## 2023-01-25 PROCEDURE — 6360000002 HC RX W HCPCS: Performed by: OBSTETRICS & GYNECOLOGY

## 2023-01-25 PROCEDURE — 86850 RBC ANTIBODY SCREEN: CPT

## 2023-01-25 PROCEDURE — 86900 BLOOD TYPING SEROLOGIC ABO: CPT

## 2023-01-25 PROCEDURE — 2580000003 HC RX 258: Performed by: OBSTETRICS & GYNECOLOGY

## 2023-01-25 PROCEDURE — G0480 DRUG TEST DEF 1-7 CLASSES: HCPCS

## 2023-01-25 PROCEDURE — 85025 COMPLETE CBC W/AUTO DIFF WBC: CPT

## 2023-01-25 PROCEDURE — 7200000001 HC VAGINAL DELIVERY

## 2023-01-25 RX ORDER — MISOPROSTOL 200 UG/1
800 TABLET ORAL PRN
Status: DISCONTINUED | OUTPATIENT
Start: 2023-01-25 | End: 2023-01-25

## 2023-01-25 RX ORDER — METHYLERGONOVINE MALEATE 0.2 MG/ML
200 INJECTION INTRAVENOUS PRN
Status: DISCONTINUED | OUTPATIENT
Start: 2023-01-25 | End: 2023-01-25

## 2023-01-25 RX ORDER — SODIUM CHLORIDE, SODIUM LACTATE, POTASSIUM CHLORIDE, AND CALCIUM CHLORIDE .6; .31; .03; .02 G/100ML; G/100ML; G/100ML; G/100ML
500 INJECTION, SOLUTION INTRAVENOUS PRN
Status: DISCONTINUED | OUTPATIENT
Start: 2023-01-25 | End: 2023-01-25

## 2023-01-25 RX ORDER — DOCUSATE SODIUM 100 MG/1
100 CAPSULE, LIQUID FILLED ORAL 2 TIMES DAILY
Status: DISCONTINUED | OUTPATIENT
Start: 2023-01-25 | End: 2023-01-25

## 2023-01-25 RX ORDER — SODIUM CHLORIDE 0.9 % (FLUSH) 0.9 %
5-40 SYRINGE (ML) INJECTION EVERY 12 HOURS SCHEDULED
Status: DISCONTINUED | OUTPATIENT
Start: 2023-01-25 | End: 2023-01-27 | Stop reason: HOSPADM

## 2023-01-25 RX ORDER — SODIUM CHLORIDE 9 MG/ML
INJECTION, SOLUTION INTRAVENOUS PRN
Status: DISCONTINUED | OUTPATIENT
Start: 2023-01-25 | End: 2023-01-27 | Stop reason: HOSPADM

## 2023-01-25 RX ORDER — SODIUM CHLORIDE 0.9 % (FLUSH) 0.9 %
5-40 SYRINGE (ML) INJECTION PRN
Status: DISCONTINUED | OUTPATIENT
Start: 2023-01-25 | End: 2023-01-25

## 2023-01-25 RX ORDER — IBUPROFEN 800 MG/1
800 TABLET ORAL EVERY 8 HOURS PRN
Status: DISCONTINUED | OUTPATIENT
Start: 2023-01-25 | End: 2023-01-27 | Stop reason: HOSPADM

## 2023-01-25 RX ORDER — MODIFIED LANOLIN
OINTMENT (GRAM) TOPICAL PRN
Status: DISCONTINUED | OUTPATIENT
Start: 2023-01-25 | End: 2023-01-27 | Stop reason: HOSPADM

## 2023-01-25 RX ORDER — SODIUM CHLORIDE 0.9 % (FLUSH) 0.9 %
5-40 SYRINGE (ML) INJECTION PRN
Status: DISCONTINUED | OUTPATIENT
Start: 2023-01-25 | End: 2023-01-27 | Stop reason: HOSPADM

## 2023-01-25 RX ORDER — ONDANSETRON 2 MG/ML
4 INJECTION INTRAMUSCULAR; INTRAVENOUS EVERY 6 HOURS PRN
Status: DISCONTINUED | OUTPATIENT
Start: 2023-01-25 | End: 2023-01-25

## 2023-01-25 RX ORDER — SODIUM CHLORIDE, SODIUM LACTATE, POTASSIUM CHLORIDE, CALCIUM CHLORIDE 600; 310; 30; 20 MG/100ML; MG/100ML; MG/100ML; MG/100ML
INJECTION, SOLUTION INTRAVENOUS CONTINUOUS
Status: DISCONTINUED | OUTPATIENT
Start: 2023-01-25 | End: 2023-01-25

## 2023-01-25 RX ORDER — SODIUM CHLORIDE, SODIUM LACTATE, POTASSIUM CHLORIDE, AND CALCIUM CHLORIDE .6; .31; .03; .02 G/100ML; G/100ML; G/100ML; G/100ML
1000 INJECTION, SOLUTION INTRAVENOUS PRN
Status: DISCONTINUED | OUTPATIENT
Start: 2023-01-25 | End: 2023-01-25

## 2023-01-25 RX ORDER — SODIUM CHLORIDE, SODIUM LACTATE, POTASSIUM CHLORIDE, AND CALCIUM CHLORIDE .6; .31; .03; .02 G/100ML; G/100ML; G/100ML; G/100ML
1000 INJECTION, SOLUTION INTRAVENOUS ONCE
Status: COMPLETED | OUTPATIENT
Start: 2023-01-25 | End: 2023-01-25

## 2023-01-25 RX ORDER — CARBOPROST TROMETHAMINE 250 UG/ML
250 INJECTION, SOLUTION INTRAMUSCULAR PRN
Status: DISCONTINUED | OUTPATIENT
Start: 2023-01-25 | End: 2023-01-25

## 2023-01-25 RX ORDER — SODIUM CHLORIDE 0.9 % (FLUSH) 0.9 %
5-40 SYRINGE (ML) INJECTION EVERY 12 HOURS SCHEDULED
Status: DISCONTINUED | OUTPATIENT
Start: 2023-01-25 | End: 2023-01-25

## 2023-01-25 RX ORDER — SODIUM CHLORIDE 9 MG/ML
25 INJECTION, SOLUTION INTRAVENOUS PRN
Status: DISCONTINUED | OUTPATIENT
Start: 2023-01-25 | End: 2023-01-25

## 2023-01-25 RX ORDER — ACETAMINOPHEN 500 MG
1000 TABLET ORAL EVERY 8 HOURS PRN
Status: DISCONTINUED | OUTPATIENT
Start: 2023-01-25 | End: 2023-01-27 | Stop reason: HOSPADM

## 2023-01-25 RX ADMIN — Medication: at 23:37

## 2023-01-25 RX ADMIN — IBUPROFEN 800 MG: 800 TABLET, FILM COATED ORAL at 23:37

## 2023-01-25 RX ADMIN — Medication 15 ML/HR: at 11:40

## 2023-01-25 RX ADMIN — SODIUM CHLORIDE, POTASSIUM CHLORIDE, SODIUM LACTATE AND CALCIUM CHLORIDE 1000 ML: 600; 310; 30; 20 INJECTION, SOLUTION INTRAVENOUS at 11:54

## 2023-01-25 RX ADMIN — SODIUM CHLORIDE, POTASSIUM CHLORIDE, SODIUM LACTATE AND CALCIUM CHLORIDE: 600; 310; 30; 20 INJECTION, SOLUTION INTRAVENOUS at 12:54

## 2023-01-25 RX ADMIN — Medication 2 MILLI-UNITS/MIN: at 06:54

## 2023-01-25 RX ADMIN — SODIUM CHLORIDE, POTASSIUM CHLORIDE, SODIUM LACTATE AND CALCIUM CHLORIDE: 600; 310; 30; 20 INJECTION, SOLUTION INTRAVENOUS at 06:55

## 2023-01-25 ASSESSMENT — PAIN DESCRIPTION - DESCRIPTORS: DESCRIPTORS: ACHING

## 2023-01-25 ASSESSMENT — PAIN DESCRIPTION - FREQUENCY: FREQUENCY: CONTINUOUS

## 2023-01-25 ASSESSMENT — PAIN SCALES - GENERAL: PAINLEVEL_OUTOF10: 3

## 2023-01-25 ASSESSMENT — PAIN - FUNCTIONAL ASSESSMENT: PAIN_FUNCTIONAL_ASSESSMENT: ACTIVITIES ARE NOT PREVENTED

## 2023-01-25 ASSESSMENT — PAIN DESCRIPTION - PAIN TYPE: TYPE: ACUTE PAIN

## 2023-01-25 NOTE — H&P
Department of Obstetrics and Gynecology  Attending Obstetrics History and Physical        CHIEF COMPLAINT:  Here for IOL    HISTORY OF PRESENT ILLNESS:      The patient is a 23 y.o.  2 parity 1 at 43 weeks. Patient presents with a chief complaint as above and is being admitted for induction. She has a favorable cervix and desires elective delivery      OB History    Para Term  AB Living   2 1 1     1   SAB IAB Ectopic Molar Multiple Live Births             1      # Outcome Date GA Lbr Remi/2nd Weight Sex Delivery Anes PTL Lv   2 Current            1 Term 21 39w0d   F Vag-Spont   REZA     Past Medical History:        Diagnosis Date    ADHD     Autism     Bipolar 1 disorder ([facility] [address]     PTSD (post-traumatic stress disorder)      Past Surgical History:    No past surgical history on file. Social History:    TOBACCO:   reports that she has been smoking cigarettes. She has been smoking an average of .25 packs per day. She has been exposed to tobacco smoke. She has never used smokeless tobacco.  ETOH:   reports no history of alcohol use. DRUGS:   reports that she does not currently use drugs. Family History:   No family history on file.   Medications Prior to Admission:  Medications Prior to Admission: sertraline (ZOLOFT) 100 MG tablet, 150 mg  sertraline (ZOLOFT) 50 MG tablet,   Prenatal Vit-Fe Fumarate-FA (PRENATAL 1+1 PO), Take by mouth  Allergies:  Shellfish-derived products and Fish-derived products      PHYSICAL EXAM:    General appearance:  awake, alert, cooperative, no apparent distress, and appears stated age  Neurologic:  Normal DTRs  Lungs:  No increased work of breathing, good air exchange, clear to auscultation bilaterally, no crackles or wheezing  Heart:  Normal apical impulse, regular rate and rhythm, normal S1 and S2, no S3 or S4, and no murmur noted  Abdomen:  Gravid, soft nontender  Fetal heart rate:  Baseline Heart Rate 145, accelerations:  present    Cervix:    DILATION:  2 - 3 cm  EFFACEMENT:   50%  STATION:  -3 cm      Contraction frequency:  3 minutes  Membranes:  Ruptured clear fluid    General Labs:  CBC:   Lab Results   Component Value Date/Time    WBC 14.8 2023 06:00 AM    RBC 3.69 2023 06:00 AM    HGB 10.3 2023 06:00 AM    HCT 31.6 2023 06:00 AM    MCV 85.6 2023 06:00 AM    RDW 13.5 2023 06:00 AM     2023 06:00 AM       ASSESSMENT AND PLAN:    The patient is a 23 y.o.  2 parity 1 at 44 weeks  IOL with pitocin and MARTHA Archer MD  2023

## 2023-01-25 NOTE — ANESTHESIA PRE PROCEDURE
Department of Anesthesiology  Preprocedure Note       Name:  Kim Ng   Age:  23 y. o.  :  2000                                          MRN:  [id number]         Date:  2023      Surgeon: * No surgeons listed *    Procedure: * No procedures listed *    Medications prior to admission:   Prior to Admission medications    Medication Sig Start Date End Date Taking?  Authorizing Provider   sertraline (ZOLOFT) 100 MG tablet 150 mg 22   Historical Provider, MD   sertraline (ZOLOFT) 50 MG tablet  22   Historical Provider, MD   Prenatal Vit-Fe Fumarate-FA (PRENATAL 1+1 PO) Take by mouth    Historical Provider, MD       Current medications:    Current Facility-Administered Medications   Medication Dose Route Frequency Provider Last Rate Last Admin    lactated ringers IV soln infusion   IntraVENous Continuous Rosemary Ward  mL/hr at 23 0655 New Bag at 23 0655    lactated ringers bolus  500 mL IntraVENous PRN Rosemary Ward MD        Or    lactated ringers bolus  1,000 mL IntraVENous PRN Rosemary Ward MD        sodium chloride flush 0.9 % injection 5-40 mL  5-40 mL IntraVENous 2 times per day Rosemary Ward MD        sodium chloride flush 0.9 % injection 5-40 mL  5-40 mL IntraVENous PRN Rosemary Ward MD        0.9 % sodium chloride infusion  25 mL IntraVENous PRN Rosemary Ward MD        methylergonovine (METHERGINE) injection 200 mcg  200 mcg IntraMUSCular PRN Rosemary Ward MD        carboprost (HEMABATE) injection 250 mcg  250 mcg IntraMUSCular PRN Rsoemary Ward MD        miSOPROStol (CYTOTEC) tablet 800 mcg  800 mcg Rectal PRN Rosemary Ward MD        tranexamic acid (CYKLOKAPRON) 1,000 mg in sodium chloride 0.9 % 100 mL IVPB  1,000 mg IntraVENous Once PRN Rosemary Ward MD        oxytocin (PITOCIN) 30 units in 500 mL infusion  87.3 tavo-units/min IntraVENous Continuous PRN Rosemary Ward MD        And    oxytocin (PITOCIN) 30 units in 500 mL infusion  10 Units IntraVENous PRN Dalia Flynn MD        ondansetron [facility]) injection 4 mg  4 mg IntraVENous Q6H PRN Dalia Flynn MD        docusate sodium (COLACE) capsule 100 mg  100 mg Oral BID Dalia Flynn MD        oxytocin (PITOCIN) 30 units in 500 mL infusion  1-20 tavo-units/min IntraVENous Continuous Dalia Flynn MD 12 mL/hr at 01/25/23 1041 12 tavo-units/min at 01/25/23 1041       Allergies: Allergies   Allergen Reactions    Shellfish-Derived Products Anaphylaxis    Fish-Derived Products        Problem List:    Patient Active Problem List   Diagnosis Code    Depression affecting pregnancy O99.340, F32. A    Depression with suicidal ideation F32. A, R45.851    Bipolar disorder, curr episode mixed, severe, w/o psychotic features ([facility] [address] F31.63    36 weeks gestation of pregnancy O2Z.86    Complicated pregnancy, third trimester O26.93    Pregnancy complication, third trimester O26.93    Term pregnancy Z34.90    Normal labor and delivery O80       Past Medical History:        Diagnosis Date    ADHD     Autism     Bipolar 1 disorder ([facility] [address]     PTSD (post-traumatic stress disorder)        Past Surgical History:  No past surgical history on file. Social History:    Social History     Tobacco Use    Smoking status: Every Day     Packs/day: 0.25     Types: Cigarettes     Passive exposure: Past    Smokeless tobacco: Never   Substance Use Topics    Alcohol use:  No                                Ready to quit: No  Counseling given: Yes      Vital Signs (Current):   Vitals:    01/25/23 0941 01/25/23 0955 01/25/23 1010 01/25/23 1026   BP: (!) 109/57 (!) 105/59 (!) 111/55 (!) 108/59   Pulse: 85 83 90 83   Resp:       Temp:       TempSrc:       SpO2:       Weight:       Height:                                                  BP Readings from Last 3 Encounters:   01/25/23 (!) 108/59   01/20/23 110/68   01/10/23 107/65       NPO Status: BMI:   Wt Readings from Last 3 Encounters:   01/25/23 182 lb (82.6 kg)   01/10/23 173 lb (78.5 kg)   11/09/22 160 lb (72.6 kg)     Body mass index is 33.29 kg/m². CBC:   Lab Results   Component Value Date/Time    WBC 14.8 01/25/2023 06:00 AM    RBC 3.69 01/25/2023 06:00 AM    HGB 10.3 01/25/2023 06:00 AM    HCT 31.6 01/25/2023 06:00 AM    MCV 85.6 01/25/2023 06:00 AM    RDW 13.5 01/25/2023 06:00 AM     01/25/2023 06:00 AM       CMP:   Lab Results   Component Value Date/Time     01/25/2023 06:00 AM    K 4.6 01/25/2023 06:00 AM    K 3.9 08/12/2020 01:43 AM    CL 96 01/25/2023 06:00 AM    CO2 21 01/25/2023 06:00 AM    BUN 8 01/25/2023 06:00 AM    CREATININE 0.4 01/25/2023 06:00 AM    GFRAA >60 06/09/2021 10:08 PM    LABGLOM >60 01/25/2023 06:00 AM    GLUCOSE 95 01/25/2023 06:00 AM    GLUCOSE 71 05/09/2012 07:00 AM    PROT 6.6 01/25/2023 06:00 AM    CALCIUM 9.3 01/25/2023 06:00 AM    BILITOT 0.2 01/25/2023 06:00 AM    ALKPHOS 149 01/25/2023 06:00 AM    AST 24 01/25/2023 06:00 AM    ALT 11 01/25/2023 06:00 AM       POC Tests: No results for input(s): POCGLU, POCNA, POCK, POCCL, POCBUN, POCHEMO, POCHCT in the last 72 hours.     Coags:   Lab Results   Component Value Date/Time    PROTIME 13.7 08/11/2020 10:48 PM    INR 1.2 08/11/2020 10:48 PM    APTT 28.7 08/11/2020 10:48 PM       HCG (If Applicable):   Lab Results   Component Value Date    PREGTESTUR NEGATIVE 12/17/2018        ABGs: No results found for: PHART, PO2ART, JTK7IIY, PPE0AIY, BEART, C6HIPRCS     Type & Screen (If Applicable):  No results found for: LABABO, LABRH    Drug/Infectious Status (If Applicable):  No results found for: HIV, HEPCAB    COVID-19 Screening (If Applicable):   Lab Results   Component Value Date/Time    COVID19 DETECTED 11/09/2022 01:03 PM           Anesthesia Evaluation  Patient summary reviewed  Airway: Mallampati: II  TM distance: >3 FB   Neck ROM: full  Mouth opening: > = 3 FB   Dental: normal exam         Pulmonary:Negative Pulmonary ROS breath sounds clear to auscultation                             Cardiovascular:Negative CV ROS            Rhythm: regular  Rate: normal                    Neuro/Psych:   (+) psychiatric history:            GI/Hepatic/Renal: Neg GI/Hepatic/Renal ROS            Endo/Other: Negative Endo/Other ROS                    Abdominal:       Abdomen: soft. Vascular: Other Findings:           Anesthesia Plan      epidural     ASA 2       Induction: intravenous. Anesthetic plan and risks discussed with patient. Plan discussed with CRNA.                     Jony Hunter MD   1/25/2023

## 2023-01-25 NOTE — PROGRESS NOTES
Pitocin started per order. Category I tracing verified by two RNs. This RN monitoring fetal heart rate and uterine activity every 15 minutes while patient is undergoing induction of labor. Call light in reach.

## 2023-01-25 NOTE — PROGRESS NOTES
MONICA Harkins CRNA in room for procedure at 1110    Patient sitting at side of bed for epidural insertion at 1112    Epidural catheter placed at 1132    Test dose given by MONICA Harkins CRNA at 1136    Epidural bolus given by MONICA Harkins CRNA at 9566    Patient returned to semi-dalal's position in bed at 1140    Epidural pump programmed to continuously infuse by MONICA Harkins CRNA at 0037

## 2023-01-26 LAB
BASOPHILS ABSOLUTE: 0 E9/L (ref 0–0.2)
BASOPHILS RELATIVE PERCENT: 0.7 % (ref 0–2)
EOSINOPHILS ABSOLUTE: 0.25 E9/L (ref 0.05–0.5)
EOSINOPHILS RELATIVE PERCENT: 1.7 % (ref 0–6)
FETAL SCREEN: NORMAL
HCT VFR BLD CALC: 31.6 % (ref 34–48)
HEMOGLOBIN: 10.3 G/DL (ref 11.5–15.5)
INTEGRITY CHECK, CREATININE, URINE: 50.4
INTEGRITY CHECK, OXIDANT, URINE: <40
INTEGRITY CHECK, PH, URINE: 8 (ref 4.5–9)
INTEGRITY CHECK, SPECIFIC GRAVITY, URINE: 1.01 (ref 1–1.03)
INTEGRITY CHECK, SPECIMEN INTEGRITY, URINE: NORMAL
LYMPHOCYTES ABSOLUTE: 2.52 E9/L (ref 1.5–4)
LYMPHOCYTES RELATIVE PERCENT: 16.5 % (ref 20–42)
MCH RBC QN AUTO: 27.9 PG (ref 26–35)
MCHC RBC AUTO-ENTMCNC: 32.6 % (ref 32–34.5)
MCV RBC AUTO: 85.6 FL (ref 80–99.9)
MONOCYTES ABSOLUTE: 0.44 E9/L (ref 0.1–0.95)
MONOCYTES RELATIVE PERCENT: 2.6 % (ref 2–12)
MYELOCYTE PERCENT: 0.9 % (ref 0–0)
NEUTROPHILS ABSOLUTE: 11.69 E9/L (ref 1.8–7.3)
NEUTROPHILS RELATIVE PERCENT: 78.3 % (ref 43–80)
PDW BLD-RTO: 13.5 FL (ref 11.5–15)
PLATELET # BLD: 189 E9/L (ref 130–450)
PMV BLD AUTO: 10.5 FL (ref 7–12)
RBC # BLD: 3.69 E12/L (ref 3.5–5.5)
RHIG LOT NUMBER: NORMAL
WBC # BLD: 14.8 E9/L (ref 4.5–11.5)

## 2023-01-26 PROCEDURE — 1220000001 HC SEMI PRIVATE L&D R&B

## 2023-01-26 PROCEDURE — 86780 TREPONEMA PALLIDUM: CPT

## 2023-01-26 PROCEDURE — 86592 SYPHILIS TEST NON-TREP QUAL: CPT

## 2023-01-26 PROCEDURE — 36415 COLL VENOUS BLD VENIPUNCTURE: CPT

## 2023-01-26 PROCEDURE — 6370000000 HC RX 637 (ALT 250 FOR IP): Performed by: OBSTETRICS & GYNECOLOGY

## 2023-01-26 PROCEDURE — 86593 SYPHILIS TEST NON-TREP QUANT: CPT

## 2023-01-26 PROCEDURE — 85461 HEMOGLOBIN FETAL: CPT

## 2023-01-26 PROCEDURE — 6360000002 HC RX W HCPCS: Performed by: OBSTETRICS & GYNECOLOGY

## 2023-01-26 RX ADMIN — HUMAN RHO(D) IMMUNE GLOBULIN 300 MCG: 300 INJECTION, SOLUTION INTRAMUSCULAR at 14:57

## 2023-01-26 RX ADMIN — ACETAMINOPHEN 1000 MG: 500 TABLET, FILM COATED ORAL at 15:20

## 2023-01-26 RX ADMIN — SERTRALINE 150 MG: 50 TABLET, FILM COATED ORAL at 08:41

## 2023-01-26 RX ADMIN — ACETAMINOPHEN 1000 MG: 500 TABLET, FILM COATED ORAL at 05:29

## 2023-01-26 RX ADMIN — IBUPROFEN 800 MG: 800 TABLET, FILM COATED ORAL at 08:41

## 2023-01-26 ASSESSMENT — PAIN SCALES - GENERAL
PAINLEVEL_OUTOF10: 2
PAINLEVEL_OUTOF10: 3
PAINLEVEL_OUTOF10: 0
PAINLEVEL_OUTOF10: 3

## 2023-01-26 ASSESSMENT — PAIN DESCRIPTION - LOCATION
LOCATION: ABDOMEN
LOCATION: ABDOMEN

## 2023-01-26 ASSESSMENT — PAIN DESCRIPTION - DESCRIPTORS
DESCRIPTORS: CRAMPING
DESCRIPTORS: CRAMPING

## 2023-01-26 NOTE — ANESTHESIA POSTPROCEDURE EVALUATION
Department of Anesthesiology  Postprocedure Note    Patient: Celestine Calderon  MRN: [id number]  YOB: 2000  Date of evaluation: 1/26/2023      Procedure Summary     Date: 01/25/23 Room / Location:     Anesthesia Start: 1110 Anesthesia Stop: 1910    Procedure: Labor Analgesia Diagnosis:     Scheduled Providers:  Responsible Provider: Mary Corea MD    Anesthesia Type: epidural ASA Status: 2          Anesthesia Type: No value filed.     Amado Phase I:      Amado Phase II:        Anesthesia Post Evaluation    Patient location during evaluation: bedside  Patient participation: complete - patient participated  Level of consciousness: awake  Pain score: 2  Airway patency: patent  Nausea & Vomiting: no nausea  Complications: no  Cardiovascular status: blood pressure returned to baseline  Respiratory status: acceptable  Hydration status: euvolemic

## 2023-01-26 NOTE — PLAN OF CARE
Problem: Postpartum  Goal: Experiences normal postpartum course  Description:  Postpartum OB-Pregnancy care plan goal which identifies if the mother is experiencing a normal postpartum course  2023 0950 by Roberta Chiu RN  Outcome: Progressing  2023 by Don Horn RN  Outcome: Progressing  Goal: Appropriate maternal -  bonding  Description:  Postpartum OB-Pregnancy care plan goal which identifies if the mother and  are bonding appropriately  2023 0950 by Roberta Chiu RN  Outcome: Progressing  2023 by Don Horn RN  Outcome: Progressing  Goal: Establishment of infant feeding pattern  Description:  Postpartum OB-Pregnancy care plan goal which identifies if the mother is establishing a feeding pattern with their   Outcome: Progressing  Goal: Incisions, wounds, or drain sites healing without S/S of infection  Outcome: Progressing     Problem: Pain  Goal: Verbalizes/displays adequate comfort level or baseline comfort level  Outcome: Progressing     Problem: Infection - Adult  Goal: Absence of infection at discharge  Outcome: Progressing  Goal: Absence of infection during hospitalization  Outcome: Progressing  Goal: Absence of fever/infection during anticipated neutropenic period  Outcome: Progressing     Problem: Safety - Adult  Goal: Free from fall injury  Outcome: Progressing     Problem: Discharge Planning  Goal: Discharge to home or other facility with appropriate resources  Outcome: Progressing     Problem: Chronic Conditions and Co-morbidities  Goal: Patient's chronic conditions and co-morbidity symptoms are monitored and maintained or improved  Outcome: Progressing

## 2023-01-26 NOTE — L&D DELIVERY NOTE
VAGINAL DELIVERY NOTE    PRE OP DX:   Term pregnancy at 44 weeks EGA, active labor    POST OP DX:  Same plus delivery of live female infant    PROCEDURE:      ANESTHESIA:  Epidural     PLACENTA:  3VC spontaneous intact    MEDS:  None    COMPLICATIONS:  None    NUCHAL CORD:  Times one, loose and reduced    SHOULDER DYSTOCIA:  None    EBL:  < 400 cc    LACERATIONS:  None    SPECIMENS:  None    BABY:  Live female infant vertex and weight and Apgars pending    Mom and baby to usual care    Katerina Garrido MD  2023

## 2023-01-26 NOTE — PROGRESS NOTES
Dr. Nilam Otero notifed pt complete and feels the urge to push. Dr. Nilam Otero is on the way. Nursery notified.

## 2023-01-26 NOTE — CARE COORDINATION
2023: SS Note:  SS Consult noted regarding \"positive UDS and wants to give temporary custody to sister\" & [facility] alert letter on pt, UDS + for Cannabinoid, 's UDS negative. yani met with pt, Sarah and her mother, Helga Rousseau who was currently holding pt's  baby, Irving Randolph and pt's sister, Liseth Miller at her bedside and who remained present for consult per pt request. 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 Pt says her mother has had full custody of her other daughter, 15 month old, Cici Loser since she was 7 weeks old through [employer] and family court due to her past drug addiction but says agency case was closed after her mother was granted custody. She reports wanting her sister to have temporary custody of  while she continues to go for counseling and o/p drug treatment. Valerie Fisher is agreeable to caring for , she lives with her fiance, Suad Tsang at [address] [place] [address] dr Jesus Lang [address] [place] [address], [facility]. She reports both her and John Paul Tongan being drug free and to having all the necessary provisions ( car seat, basinet, crib, clothing, diapers etc.) to care for infant and pt is planning to call for a [facility] appointment.   TREVON- mandated  guide for plan of safe care assessment completed and report information called to Alicja chapman for [employer], she will review information with agency supervisor for a determination if case will be assigned for ongoing services or \"screened out\" with no agency involvement. Harish Whittington relayed that pt's sister can  a custody packet at their agency or download forms online & take them to [employer] to file for temporary custody of , do not need an  but there is a $140 filing fee and free  advice or assistance available on  and , sw provided information to pt and pt's sister, sw will continue to follow. Nursing informed. Electronically signed by ANNIE Davila on 2023 at 2:17 PM

## 2023-01-26 NOTE — PROGRESS NOTES
[facility] [place] Hearing screening results were discussed with parent. Questions answered. Brochure given to parent. Advised to monitor developmental milestones and contact physician for any concerns.    Jeff Quintero

## 2023-01-26 NOTE — PROGRESS NOTES
Recovery period over at this time. Fundus firm and lochia WNL. Patient up to shower, with minimal assist, for first time post delivery. Successful void noted. Linens changed. No complaints at this time. Call light in reach.

## 2023-01-26 NOTE — PROGRESS NOTES
Subjective:     Postpartum Day 1: Vaginal delivery    The patient feels well. The patient denies emotional concerns. Pain is well controlled with current medications. The baby is well. Objective:      No data found. General:    alert, appears stated age, and cooperative   Bowel Sounds:  active   Lochia:  appropriate   Uterine Fundus:   firm       DVT Evaluation:  No evidence of DVT seen on physical exam.  Negative Gagan's sign. No cords or calf tenderness. Assessment:     1. Post partum day 1 . Doing well. Plan:     Continue current care.

## 2023-01-27 VITALS
HEART RATE: 100 BPM | BODY MASS INDEX: 33.49 KG/M2 | WEIGHT: 182 LBS | TEMPERATURE: 98 F | OXYGEN SATURATION: 97 % | HEIGHT: 62 IN | SYSTOLIC BLOOD PRESSURE: 120 MMHG | DIASTOLIC BLOOD PRESSURE: 68 MMHG | RESPIRATION RATE: 16 BRPM

## 2023-01-27 PROBLEM — F32.A DEPRESSION AFFECTING PREGNANCY: Status: RESOLVED | Noted: 2021-06-10 | Resolved: 2023-01-27

## 2023-01-27 PROBLEM — Z34.90 TERM PREGNANCY: Status: RESOLVED | Noted: 2023-01-20 | Resolved: 2023-01-27

## 2023-01-27 PROBLEM — O99.340 DEPRESSION AFFECTING PREGNANCY: Status: RESOLVED | Noted: 2021-06-10 | Resolved: 2023-01-27

## 2023-01-27 PROBLEM — O26.93 COMPLICATED PREGNANCY, THIRD TRIMESTER: Status: RESOLVED | Noted: 2023-01-01 | Resolved: 2023-01-27

## 2023-01-27 PROBLEM — Z3A.36 36 WEEKS GESTATION OF PREGNANCY: Status: RESOLVED | Noted: 2022-12-28 | Resolved: 2023-01-27

## 2023-01-27 PROBLEM — O26.93 PREGNANCY COMPLICATION, THIRD TRIMESTER: Status: RESOLVED | Noted: 2023-01-10 | Resolved: 2023-01-27

## 2023-01-27 LAB
COMMENT: NORMAL
RPR TITER: NORMAL
RPR: REACTIVE
THC NORMALIZED, QUANTITIATIVE, URINE: 1598.8
THC-COOH, QUANTITATIVE, URINE: 805.8

## 2023-01-27 PROCEDURE — 6370000000 HC RX 637 (ALT 250 FOR IP): Performed by: OBSTETRICS & GYNECOLOGY

## 2023-01-27 RX ORDER — IBUPROFEN 800 MG/1
800 TABLET ORAL EVERY 8 HOURS PRN
Qty: 50 TABLET | Refills: 1 | Status: SHIPPED | OUTPATIENT
Start: 2023-01-27

## 2023-01-27 RX ADMIN — IBUPROFEN 800 MG: 800 TABLET, FILM COATED ORAL at 03:04

## 2023-01-27 RX ADMIN — SERTRALINE 150 MG: 50 TABLET, FILM COATED ORAL at 08:43

## 2023-01-27 RX ADMIN — ACETAMINOPHEN 1000 MG: 500 TABLET, FILM COATED ORAL at 12:42

## 2023-01-27 ASSESSMENT — PAIN DESCRIPTION - ONSET: ONSET: ON-GOING

## 2023-01-27 ASSESSMENT — PAIN DESCRIPTION - LOCATION
LOCATION: ABDOMEN
LOCATION: ABDOMEN

## 2023-01-27 ASSESSMENT — PAIN DESCRIPTION - PAIN TYPE: TYPE: ACUTE PAIN

## 2023-01-27 ASSESSMENT — PAIN - FUNCTIONAL ASSESSMENT
PAIN_FUNCTIONAL_ASSESSMENT: ACTIVITIES ARE NOT PREVENTED
PAIN_FUNCTIONAL_ASSESSMENT: ACTIVITIES ARE NOT PREVENTED

## 2023-01-27 ASSESSMENT — PAIN DESCRIPTION - DESCRIPTORS
DESCRIPTORS: CRAMPING
DESCRIPTORS: ACHING;DISCOMFORT

## 2023-01-27 ASSESSMENT — PAIN SCALES - GENERAL
PAINLEVEL_OUTOF10: 7
PAINLEVEL_OUTOF10: 5
PAINLEVEL_OUTOF10: 0

## 2023-01-27 ASSESSMENT — PAIN DESCRIPTION - ORIENTATION
ORIENTATION: LOWER
ORIENTATION: LOWER;MID

## 2023-01-27 ASSESSMENT — PAIN DESCRIPTION - FREQUENCY: FREQUENCY: INTERMITTENT

## 2023-01-27 NOTE — DISCHARGE SUMMARY
Obstetric Discharge Summary    Admitting Diagnosis  IUP 39 weeks  OB History          2    Para   2    Term   2            AB        Living   2         SAB        IAB        Ectopic        Molar        Multiple   0    Live Births   2                Reasons for Admission on 2023  5:34 AM  Normal labor and delivery [O80]  No comment available  Onset of Labor    Prenatal Procedures  Ultrasound # 3    Intrapartum Procedures        Multiple birth?: No        Spontaneous Vaginal Delivery: See Labor and Delivery Summary       Postpartum Procedures  None  Postpartum/Operative Complications        Data  Information for the patient's :  Dina Don Sarah [[id number]]   female   Birth Weight: 7 lb 14 oz (3.572 kg)   Discharge With Mother  Complications: No    Discharge Diagnosis       Discharge Information  Current Discharge Medication List        CONTINUE these medications which have NOT CHANGED    Details   !! sertraline (ZOLOFT) 100 MG tablet 150 mg      !! sertraline (ZOLOFT) 50 MG tablet       Prenatal Vit-Fe Fumarate-FA (PRENATAL 1+1 PO) Take by mouth       !! - Potential duplicate medications found. Please discuss with provider. No discharge procedures on file. Discharged Condition: good    Discharge to: Home  Follow up in 6 weeks at .me. Accompanied by self.     Discharge Date: 2023     Lulu Winston MD  2023

## 2023-01-27 NOTE — CARE COORDINATION
2023: SS Note:  [facility] [address] met with pt and completed a safety plan with maternal grandmother, Helga Rousseau,  to be released to pt today and will be going to grandmother's home who agreed to provide care and supervise pt with  after discharge, agency will continue to follow, copy of safety plan placed in 's soft chart, nursing informed.  Electronically signed by ANNIE Alcantar on 2023 at 12:33 PM

## 2023-01-27 NOTE — CARE COORDINATION
2023: SS Note:  Notified by Cady chapman for [facility] that agency has opened a case for ongoing services and Jeanna Player is the assigned , she will be coming to the hospital today to speak with pt and to discuss a potential SAFETY PLAN for  prior to baby's release, pt and nursing informed, sw to follow. Electronically signed by ANNIE Purdy on 2023 at 10:24 AM

## 2023-01-30 LAB — TREPONEMA PALLIDUM ANTIBODIES: REACTIVE

## 2023-12-28 ENCOUNTER — HOSPITAL ENCOUNTER (INPATIENT)
Age: 23
LOS: 4 days | Discharge: HOME | DRG: 566 | End: 2024-01-01
Attending: EMERGENCY MEDICINE | Admitting: PSYCHIATRY & NEUROLOGY
Payer: COMMERCIAL

## 2023-12-28 DIAGNOSIS — R45.851 DEPRESSION WITH SUICIDAL IDEATION: Primary | ICD-10-CM

## 2023-12-28 DIAGNOSIS — F32.A DEPRESSION WITH SUICIDAL IDEATION: Primary | ICD-10-CM

## 2023-12-28 PROBLEM — F33.9: Status: ACTIVE | Noted: 2023-12-28

## 2023-12-28 PROBLEM — Z34.90 PREGNANCY: Status: ACTIVE | Noted: 2023-12-28

## 2023-12-28 PROBLEM — F19.10 POLYSUBSTANCE ABUSE (HCC): Status: ACTIVE | Noted: 2023-12-28

## 2023-12-28 LAB
ALBUMIN SERPL-MCNC: 3.9 G/DL (ref 3.5–5.2)
ALP SERPL-CCNC: 68 U/L (ref 35–104)
ALT SERPL-CCNC: 11 U/L (ref 5–33)
AMPHET UR QL SCN: NEGATIVE
ANION GAP SERPL CALCULATED.3IONS-SCNC: 13 MMOL/L (ref 9–17)
AST SERPL-CCNC: 16 U/L
B-HCG SERPL EIA 3RD IS-ACNC: ABNORMAL MIU/ML
BARBITURATES UR QL SCN: NEGATIVE
BASOPHILS # BLD: 0.1 K/UL (ref 0–0.2)
BASOPHILS NFR BLD: 1 % (ref 0–2)
BENZODIAZ UR QL: NEGATIVE
BILIRUB SERPL-MCNC: 0.5 MG/DL (ref 0.3–1.2)
BUN SERPL-MCNC: 6 MG/DL (ref 6–20)
CALCIUM SERPL-MCNC: 9.1 MG/DL (ref 8.6–10.4)
CANNABINOIDS UR QL SCN: POSITIVE
CHLORIDE SERPL-SCNC: 102 MMOL/L (ref 98–107)
CO2 SERPL-SCNC: 20 MMOL/L (ref 20–31)
COCAINE UR QL SCN: NEGATIVE
CREAT SERPL-MCNC: 0.4 MG/DL (ref 0.5–0.9)
EOSINOPHIL # BLD: 0.1 K/UL (ref 0–0.4)
EOSINOPHILS RELATIVE PERCENT: 1 % (ref 0–4)
ERYTHROCYTE [DISTWIDTH] IN BLOOD BY AUTOMATED COUNT: 15.6 % (ref 11.5–14.9)
ETHANOL PERCENT: <0.01 %
ETHANOLAMINE SERPL-MCNC: <10 MG/DL
FENTANYL UR QL: NEGATIVE
GFR SERPL CREATININE-BSD FRML MDRD: >60 ML/MIN/1.73M2
GLUCOSE SERPL-MCNC: 101 MG/DL (ref 70–99)
HCT VFR BLD AUTO: 38.7 % (ref 36–46)
HGB BLD-MCNC: 12.9 G/DL (ref 12–16)
LYMPHOCYTES NFR BLD: 1.5 K/UL (ref 1–4.8)
LYMPHOCYTES RELATIVE PERCENT: 18 % (ref 24–44)
MCH RBC QN AUTO: 27.6 PG (ref 26–34)
MCHC RBC AUTO-ENTMCNC: 33.3 G/DL (ref 31–37)
MCV RBC AUTO: 82.9 FL (ref 80–100)
METHADONE UR QL: NEGATIVE
MONOCYTES NFR BLD: 0.3 K/UL (ref 0.1–1.3)
MONOCYTES NFR BLD: 4 % (ref 1–7)
NEUTROPHILS NFR BLD: 76 % (ref 36–66)
NEUTS SEG NFR BLD: 6.6 K/UL (ref 1.3–9.1)
OPIATES UR QL SCN: NEGATIVE
OXYCODONE UR QL SCN: NEGATIVE
PCP UR QL SCN: NEGATIVE
PLATELET # BLD AUTO: 202 K/UL (ref 150–450)
PMV BLD AUTO: 8.5 FL (ref 6–12)
POTASSIUM SERPL-SCNC: 3.4 MMOL/L (ref 3.7–5.3)
PROT SERPL-MCNC: 7.3 G/DL (ref 6.4–8.3)
RBC # BLD AUTO: 4.66 M/UL (ref 4–5.2)
SODIUM SERPL-SCNC: 135 MMOL/L (ref 135–144)
TEST INFORMATION: ABNORMAL
WBC OTHER # BLD: 8.5 K/UL (ref 3.5–11)

## 2023-12-28 PROCEDURE — 99285 EMERGENCY DEPT VISIT HI MDM: CPT

## 2023-12-28 PROCEDURE — 6370000000 HC RX 637 (ALT 250 FOR IP): Performed by: EMERGENCY MEDICINE

## 2023-12-28 PROCEDURE — 84702 CHORIONIC GONADOTROPIN TEST: CPT

## 2023-12-28 PROCEDURE — 1240000000 HC EMOTIONAL WELLNESS R&B

## 2023-12-28 PROCEDURE — 99222 1ST HOSP IP/OBS MODERATE 55: CPT | Performed by: INTERNAL MEDICINE

## 2023-12-28 PROCEDURE — G0480 DRUG TEST DEF 1-7 CLASSES: HCPCS

## 2023-12-28 PROCEDURE — 85025 COMPLETE CBC W/AUTO DIFF WBC: CPT

## 2023-12-28 PROCEDURE — 6370000000 HC RX 637 (ALT 250 FOR IP)

## 2023-12-28 PROCEDURE — APPSS60 APP SPLIT SHARED TIME 46-60 MINUTES

## 2023-12-28 PROCEDURE — 80307 DRUG TEST PRSMV CHEM ANLYZR: CPT

## 2023-12-28 PROCEDURE — 80053 COMPREHEN METABOLIC PANEL: CPT

## 2023-12-28 PROCEDURE — 36415 COLL VENOUS BLD VENIPUNCTURE: CPT

## 2023-12-28 RX ORDER — POTASSIUM CHLORIDE 20 MEQ/1
40 TABLET, EXTENDED RELEASE ORAL ONCE
Status: COMPLETED | OUTPATIENT
Start: 2023-12-28 | End: 2023-12-28

## 2023-12-28 RX ORDER — PSEUDOEPHEDRINE HCL 30 MG
100 TABLET ORAL DAILY
COMMUNITY
Start: 2021-12-04

## 2023-12-28 RX ORDER — DOCUSATE SODIUM 100 MG/1
100 CAPSULE, LIQUID FILLED ORAL DAILY
Status: DISCONTINUED | OUTPATIENT
Start: 2023-12-28 | End: 2023-12-28

## 2023-12-28 RX ORDER — ACETAMINOPHEN 325 MG/1
650 TABLET ORAL EVERY 6 HOURS PRN
Status: DISCONTINUED | OUTPATIENT
Start: 2023-12-28 | End: 2024-01-01 | Stop reason: HOSPADM

## 2023-12-28 RX ORDER — SWAB
1 SWAB, NON-MEDICATED MISCELLANEOUS DAILY
Status: DISCONTINUED | OUTPATIENT
Start: 2023-12-28 | End: 2024-01-01 | Stop reason: HOSPADM

## 2023-12-28 RX ADMIN — POTASSIUM CHLORIDE 40 MEQ: 1500 TABLET, EXTENDED RELEASE ORAL at 10:47

## 2023-12-28 RX ADMIN — Medication 1 TABLET: at 14:50

## 2023-12-28 ASSESSMENT — LIFESTYLE VARIABLES
HOW OFTEN DO YOU HAVE A DRINK CONTAINING ALCOHOL: NEVER
HOW OFTEN DO YOU HAVE A DRINK CONTAINING ALCOHOL: NEVER
HOW MANY STANDARD DRINKS CONTAINING ALCOHOL DO YOU HAVE ON A TYPICAL DAY: PATIENT DOES NOT DRINK
HOW OFTEN DO YOU HAVE A DRINK CONTAINING ALCOHOL: NEVER
HOW MANY STANDARD DRINKS CONTAINING ALCOHOL DO YOU HAVE ON A TYPICAL DAY: PATIENT DOES NOT DRINK
HOW MANY STANDARD DRINKS CONTAINING ALCOHOL DO YOU HAVE ON A TYPICAL DAY: PATIENT DOES NOT DRINK

## 2023-12-28 ASSESSMENT — PATIENT HEALTH QUESTIONNAIRE - PHQ9
3. TROUBLE FALLING OR STAYING ASLEEP: 2
SUM OF ALL RESPONSES TO PHQ QUESTIONS 1-9: 0
6. FEELING BAD ABOUT YOURSELF - OR THAT YOU ARE A FAILURE OR HAVE LET YOURSELF OR YOUR FAMILY DOWN: 2
2. FEELING DOWN, DEPRESSED OR HOPELESS: 0
1. LITTLE INTEREST OR PLEASURE IN DOING THINGS: 3
SUM OF ALL RESPONSES TO PHQ QUESTIONS 1-9: 0
SUM OF ALL RESPONSES TO PHQ QUESTIONS 1-9: 18
SUM OF ALL RESPONSES TO PHQ QUESTIONS 1-9: 20
9. THOUGHTS THAT YOU WOULD BE BETTER OFF DEAD, OR OF HURTING YOURSELF: 2
1. LITTLE INTEREST OR PLEASURE IN DOING THINGS: 0
SUM OF ALL RESPONSES TO PHQ9 QUESTIONS 1 & 2: 0
5. POOR APPETITE OR OVEREATING: 2
7. TROUBLE CONCENTRATING ON THINGS, SUCH AS READING THE NEWSPAPER OR WATCHING TELEVISION: 2
SUM OF ALL RESPONSES TO PHQ QUESTIONS 1-9: 0
10. IF YOU CHECKED OFF ANY PROBLEMS, HOW DIFFICULT HAVE THESE PROBLEMS MADE IT FOR YOU TO DO YOUR WORK, TAKE CARE OF THINGS AT HOME, OR GET ALONG WITH OTHER PEOPLE: 2
SUM OF ALL RESPONSES TO PHQ QUESTIONS 1-9: 20
SUM OF ALL RESPONSES TO PHQ9 QUESTIONS 1 & 2: 6
2. FEELING DOWN, DEPRESSED OR HOPELESS: 3
SUM OF ALL RESPONSES TO PHQ QUESTIONS 1-9: 0
SUM OF ALL RESPONSES TO PHQ QUESTIONS 1-9: 20
8. MOVING OR SPEAKING SO SLOWLY THAT OTHER PEOPLE COULD HAVE NOTICED. OR THE OPPOSITE, BEING SO FIGETY OR RESTLESS THAT YOU HAVE BEEN MOVING AROUND A LOT MORE THAN USUAL: 2
4. FEELING TIRED OR HAVING LITTLE ENERGY: 2

## 2023-12-28 ASSESSMENT — SLEEP AND FATIGUE QUESTIONNAIRES
AVERAGE NUMBER OF SLEEP HOURS: 6
SLEEP PATTERN: DISTURBED/INTERRUPTED SLEEP
DO YOU USE A SLEEP AID: NO
DO YOU HAVE DIFFICULTY SLEEPING: YES
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
## 2023-12-28 NOTE — ED NOTES
Fetal heart tones attempted to be estimated via doppler - no definitive tones could be found. MD notified

## 2023-12-28 NOTE — GROUP NOTE
Group Therapy Note    Date: 12/28/2023    Group Start Time: 1430  Group End Time: 1515  Group Topic: Cognitive Skills    Vicki Sutton        Group Therapy Note    Attendees: 7/18     Cognitive Skills Group Note        Date: December 28, 2023 Start Time: 2:30pm  End Time:  1515      Number of Participants in Group & Unit Census:  7/18    Topic: Coping skills    Goal of Group: List positive coping skills      Comments:     Patient did not participate in Cognitive Skills group, despite staff encouragement and explanation of benefits.  Patient remain seclusive to self.  Q15 minute safety checks maintained for patient safety and will continue to encourage patient to attend unit programming.
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
## 2023-12-28 NOTE — PROGRESS NOTES
RT unable to meet with pt to complete assessment during this shift. RT will seek out pt to complete assessment during next shift on  12/29/2023.

## 2023-12-28 NOTE — CARE COORDINATION
Psychosocial assessment was unable to be completed on this date and will be completed on 12/29/2023.

## 2023-12-29 LAB
CHOLEST SERPL-MCNC: 143 MG/DL
CHOLESTEROL/HDL RATIO: 2.8
EST. AVERAGE GLUCOSE BLD GHB EST-MCNC: 91 MG/DL
HBA1C MFR BLD: 4.8 % (ref 4–6)
HDLC SERPL-MCNC: 52 MG/DL
LDLC SERPL CALC-MCNC: 74 MG/DL (ref 0–130)
TRIGL SERPL-MCNC: 84 MG/DL
TSH SERPL DL<=0.05 MIU/L-ACNC: 1.46 UIU/ML (ref 0.3–5)

## 2023-12-29 PROCEDURE — 36415 COLL VENOUS BLD VENIPUNCTURE: CPT

## 2023-12-29 PROCEDURE — 83036 HEMOGLOBIN GLYCOSYLATED A1C: CPT

## 2023-12-29 PROCEDURE — 90792 PSYCH DIAG EVAL W/MED SRVCS: CPT | Performed by: PSYCHIATRY & NEUROLOGY

## 2023-12-29 PROCEDURE — 6370000000 HC RX 637 (ALT 250 FOR IP)

## 2023-12-29 PROCEDURE — 99231 SBSQ HOSP IP/OBS SF/LOW 25: CPT | Performed by: INTERNAL MEDICINE

## 2023-12-29 PROCEDURE — 80061 LIPID PANEL: CPT

## 2023-12-29 PROCEDURE — 84443 ASSAY THYROID STIM HORMONE: CPT

## 2023-12-29 PROCEDURE — 1240000000 HC EMOTIONAL WELLNESS R&B

## 2023-12-29 RX ORDER — DOCUSATE SODIUM 100 MG/1
100 CAPSULE, LIQUID FILLED ORAL DAILY
Status: DISCONTINUED | OUTPATIENT
Start: 2023-12-29 | End: 2024-01-01 | Stop reason: HOSPADM

## 2023-12-29 RX ADMIN — Medication 1 TABLET: at 08:59

## 2023-12-29 ASSESSMENT — LIFESTYLE VARIABLES
HOW MANY STANDARD DRINKS CONTAINING ALCOHOL DO YOU HAVE ON A TYPICAL DAY: PATIENT DOES NOT DRINK
HOW OFTEN DO YOU HAVE A DRINK CONTAINING ALCOHOL: NEVER

## 2023-12-29 NOTE — GROUP NOTE
Group Therapy Note    Date: 12/29/2023    Group Start Time: 0900  Group End Time: 0945  Group Topic: Community Meeting    Judie Gong      Community Meeting Group Note        Date: 12/29/2023  Start Time: 9am  End Time: 0945      Number of Participants in Group & Unit Census:  10/19    Topic: Goal setting    Goal of Group:Set a short term goal for the day      Comments:     Patient did not participate in Community Meeting group, despite staff encouragement and explanation of benefits.  Patient remain seclusive to self.  Q15 minute safety checks maintained for patient safety and will continue to encourage patient to attend unit programming.

## 2023-12-29 NOTE — BH NOTE
Med list faxed to [facility] for medication verification.  
Patient arrived to unit from [facility] accompanied by 2 staff members. MD paged for orders. Patient provided with nourishment  
Patient given tobacco quitline number 11826946903 at this time, refusing to call at this time, states \" I just dont want to quit now\"- patient given information as to the dangers of long term tobacco use. Continue to reinforce the importance of tobacco cessation.    
techniques in how to quit, available resources  ( ) [facility]                                                                                                                   ( ) Patient refused counseling  ( ) Patient has not smoked in the last 30 days    Metabolic Screening:    Lab Results   Component Value Date    LABA1C 4.9 06/11/2021       Lab Results   Component Value Date    CHOL 147 06/11/2021    CHOL 136 05/16/2012    CHOL 141 03/29/2012     Lab Results   Component Value Date    TRIG 126 06/11/2021    TRIG 70 05/16/2012    TRIG 48 03/29/2012     Lab Results   Component Value Date    HDL 47 06/11/2021    HDL 54.0 05/16/2012    HDL 56.0 03/29/2012     No components found for: \"LDLCAL\"  Lab Results   Component Value Date    LABVLDL 25 06/11/2021         Body mass index is 35.75 kg/m².    BP Readings from Last 2 Encounters:   12/28/23 102/62   06/11/23 108/79           Pt admitted with followings belongings:  Dental Appliances: None  Vision - Corrective Lenses: None  Hearing Aid: None  Jewelry: Bracelet, Earrings, Ring  Body Piercings Removed: No  Clothing: Footwear, Jacket/Coat, Pants, Shirt, Shorts, Undergarments, Socks  Other Valuables: Purse, Other (Comment) (6 vape pens)  The following personal items were collected during admission. Items secured in locker/safe. Items will be returned to patient at discharge.     Magdalena Obrien RN

## 2023-12-29 NOTE — PROGRESS NOTES
Behavioral Services  Medicare Certification Upon Admission    I certify that this patient's inpatient psychiatric hospital admission is medically necessary for:    [x] (1) Treatment which could reasonably be expected to improve this patient's condition,       [x] (2) Or for diagnostic study;     AND     [x](2) The inpatient psychiatric services are provided while the individual is under the care of a physician and are included in the individualized plan of care.    Estimated length of stay/service 3 to 5 days    Plan for post-hospital care home with outpatient community mental health follow-up    Electronically signed by NGUYEN OVALLES MD on 12/29/2023 at 11:33 AM

## 2023-12-29 NOTE — PROGRESS NOTES
Pharmacy Medication History Note      List of current medications patient is taking is complete.    Source of information: [facility] ([place]), [facility], [facility] ([place]), [facility] ([place])    Changes made to medication list:  Medications removed (include reason, ex. therapy complete or physician discontinued, noncompliance):  none    Medications flagged for provider review:  Docusate, Sertraline, Ibuprofen - have not been filled since August 2023 for 30 day supply    Medications added/doses adjusted:  none    Other notes (ex. Recent course of antibiotics, Coumadin dosing):  Called all listed preferred pharmacies - most recent fills were at [facility] in August.   In August she filled: Zoloft 100 mg daily, Vivitrol 380mg monthly, Desvenlafaxine 50 mg daily, Buspar 5 mg three times daily, Abilify 20 mg daily, Citalopram 20 mg daily, Atomoxetine 40 mg daily and Trazosone 100 mg nightly as needed.       Current Home Medication List at Time of Admission:  Prior to Admission medications    Medication Sig   Prenatal Vit-Fe Fumarate-FA (PRENATAL 1+1 PO) Take one tablet by mouth daily          Please let me know if you have any questions about this encounter. Thank you!    Electronically signed by Talia Urena RPH on 12/29/2023 at 9:45 AM

## 2023-12-29 NOTE — GROUP NOTE
Group Therapy Note    Date: 12/29/2023    Group Start Time: 1100  Group End Time: 1145  Group Topic: Cognitive Skills    [facility] Dina Harris CTRS        Group Therapy Note    Attendees: 10/17     Patient's Goal: To increase social interaction, decision making, and communication skills.         Notes:  Pt was pleasant and cooperative,and participated fully in group task. Pt was able to engage in   decision making, and communication skills independently.  Pt was pleasant and cooperative and performed   task well.         Status After Intervention:  Improved     Participation Level: Active Listener and Interactive, supportive     Participation Quality: Appropriate, Attentive, Sharing, supportive     Speech:  normal       Thought Process/Content: Logical, linear r/t task and topic.       Affective Functioning: Congruent, brightened       Mood: Cooperative, Euthymic       Level of consciousness:  Alert, Oriented to task, and Attentive      Response to Learning: Able to verbalize current knowledge/experience, Able to   verbalize/acknowledge new learning, and Progressing to goal      Endings: None Reported      Modes of Intervention: Education, Support, Socialization, and Problem-solving    Discipline Responsible: Psychoeducational Specialist      Signature:  PAUL INMAN

## 2023-12-29 NOTE — CARE COORDINATION
BHI Biopsychosocial Assessment    Current Level of Psychosocial Functioning     Independent XX  Dependent    Minimal Assist     Comments:    Psychosocial High Risk Factors (check all that apply)    Unable to obtain meds   Chronic illness/pain    Substance abuse XX  Lack of Family Support   Financial stress   Isolation   Inadequate Community Resources   Suicide attempt(s)  Not taking medications   Victim of crime   Developmental Delay  Unable to manage personal needs    Age 65 or older   Homeless  No transportation   Readmission within 30 days  Unemployment  Traumatic Event    Comments:   Psychiatric Advanced Directives: n/a    Family to Involve in Treatment: n/a    Sexual Orientation:  n/a    Patient Strengths: Lives at sober living, linked to services, has insurance    Patient Barriers: Hx of admissions and substance use      Opiate Education Provided: reports 30-40 days sober since last use of substances of any type. Positive for marijuana      CMHC/mental health history: [facility] for [facility] living    Plan of Care   medication management, group/individual therapies, family meetings, psycho -education, treatment team meetings to assist with stabilization    Initial Discharge Plan: Return to [facility]      Clinical Summary: Pt is a 23 year old female admitted to [facility] for suicidal ideation with plan and intent to OD on fentanyl. Pt denied having a plan or intent during assessment stating it was \"all a misunderstanding\". Pt was irritable and guarded during SW assessment. Pt is discharge focuses. She states she and her fiance got into an argument about \"small things\" when she voiced ideation. Pt denies being suicidal. Pt denies past suicide attempts but does report five past hospitalizations at other hospitals. She identified her fiance and mother as supportive. She denies having any income at this time. She reports 30-40 days since her last use of fentanyl. UDS reflects positive for marijuana. Pt

## 2023-12-29 NOTE — GROUP NOTE
Group Therapy Note    Date: 12/29/2023    Group Start Time: 1430  Group End Time: 1530  Group Topic: Cognitive Skills    [facility] Dina [facility] CTRS        Group Therapy Note    Attendees: 7/15     Patient's Goal:  To increase social interaction, self expression, explore negatives to leave behind, and positive   coping skills/ resources/activities to work on in New Year,  and communication skills.      Notes:  Pt was pleasant and cooperative,and participated fully in task. Pt was able to engage in self expression,   explore negatives to leave behind, and positive coping skills/ resources/activities to work on in New Year, and   communication skills using creative expression and discussion.      Pt shared individually and engaged in discussion with RT and peers. Pt was supportive of peers and   able to relate to some of their ideas and experiences.     Status After Intervention:  Improved     Participation Level: Active Listener and Interactive, Supportive     Participation Quality: Appropriate, Attentive, Sharing, and Supportive     Speech:  normal       Thought Process/Content: Logical,  linear r/t task and topic.       Affective Functioning: Congruent       Mood: Cooperative, Euthymic      Level of consciousness:  Alert, Oriented x4, and Attentive      Response to Learning: Able to verbalize current knowledge/experience, Able to verbalize/acknowledge   new learning, Able to verbalize some insight, and Progressing to goal      Endings: None Reported      Modes of Intervention: Education, Support, Socialization, and Problem-solving    Discipline Responsible: Psychoeducational Specialist      Signature:  PAUL INMAN

## 2023-12-30 PROCEDURE — 99232 SBSQ HOSP IP/OBS MODERATE 35: CPT | Performed by: NURSE PRACTITIONER

## 2023-12-30 PROCEDURE — 1240000000 HC EMOTIONAL WELLNESS R&B

## 2023-12-30 PROCEDURE — 6370000000 HC RX 637 (ALT 250 FOR IP)

## 2023-12-30 RX ADMIN — Medication 1 TABLET: at 11:47

## 2023-12-30 ASSESSMENT — PAIN SCALES - GENERAL: PAINLEVEL_OUTOF10: 0
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
## 2023-12-30 NOTE — GROUP NOTE
Group Therapy Note    Date: 12/29/2023    Group Start Time: 2000  Group End Time: 2020  Group Topic: Relaxation    Nai Laughlin, RN      Group Therapy Note    Attendees: 6/16      Patient's Goal:  To acquire coping skills by developing relaxation techniques    Notes:  Pt attended and actively participated in the Relaxation group this evening.    Status After Intervention:  Improved    Participation Level: Interactive    Participation Quality: Appropriate and Attentive    Speech:  normal    Thought Process/Content: Logical    Affective Functioning: Congruent    Mood: calm and attempting to relax    Level of consciousness:  Alert and Oriented x4    Response to Learning: Progressing to goal    Endings: None Reported    Modes of Intervention: Education, Support, and Exploration    Discipline Responsible: Registered Nurse        Signature:  Nai Hercules, RN

## 2023-12-30 NOTE — GROUP NOTE
Group Therapy Note    Date: 12/30/2023    Group Start Time: 1030  Group End Time: 1110  Group Topic: Cognitive Skills    [facility]    Savannah Willson CTRS        Group Therapy Note    Attendees: 10/20       Patient's Goal:  pt will demonstrate improved coping skills and improved decision making skills     Notes:   pt was pleasant and participated well. Pt was social with peers    Status After Intervention:  Improved    Participation Level: Active Listener and Interactive    Participation Quality: Appropriate, Sharing, and Supportive      Speech:  normal      Thought Process/Content: Logical      Affective Functioning: Congruent      Mood: euthymic      Level of consciousness:  Alert      Response to Learning: Able to verbalize current knowledge/experience, Capable of insight, and Progressing to goal      Endings: None Reported    Modes of Intervention: Support, Socialization, and Activity      Discipline Responsible: Psychoeducational Specialist      Signature:  PAUL ARMENTA
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
## 2023-12-31 PROCEDURE — 6370000000 HC RX 637 (ALT 250 FOR IP)

## 2023-12-31 PROCEDURE — 1240000000 HC EMOTIONAL WELLNESS R&B

## 2023-12-31 PROCEDURE — 99232 SBSQ HOSP IP/OBS MODERATE 35: CPT | Performed by: NURSE PRACTITIONER

## 2023-12-31 RX ADMIN — Medication 1 TABLET: at 10:06

## 2023-12-31 ASSESSMENT — PAIN SCALES - GENERAL: PAINLEVEL_OUTOF10: 0

## 2023-12-31 NOTE — GROUP NOTE
Group Therapy Note    Date: 12/31/2023    Group Start Time: 1330  Group End Time: 1415  Group Topic: Cognitive Skills    [facility]        Group Therapy Note    Attendees: 8/19     Topic: To increase social interaction, decision making, and communication skills.        Patient did not participate in Cognitive Skills Group at 13:30, despite staff encouragement and explanation of   benefits. Patient remains seclusive to self and room.       Q15 minute safety checks maintained for patient safety and will continue to encourage patient to attend unit   programming.         Discipline Responsible: Psychoeducational Specialist    Signature:  PAUL INMAN

## 2023-12-31 NOTE — PROGRESS NOTES
Daily Progress Note  12/31/2023    Patient Name: Sarah Meneses    CHIEF COMPLAINT:  Major depressive disorder without psychotic features          SUBJECTIVE:    Sarah was seen for follow-up assessment today.  She has been compliant with scheduled prenatal vitamin and Colace and behaviorally in control.  She has not required any emergency medications in the past 24 hours.  Nursing staff report patient has presented with a brightened affect today and is cooperative, social, and spending time in the day area.  She has been focused on discharge today.    Patient was resting in bed on approach but awake and agreeable to conversation.  She states that she is no longer feeling depressed or suicidal.  She feels she is able to contract for safety in the community.  Her current plan is to return to [facility] and her bed is being held for her. She is not experiencing any auditory or visual hallucinations and she made no delusional or paranoid statements. Thoughts and speech are organized and linear.  Patient will be seen by attending psychiatrist in the morning for possible discharge.    Appetite:  [x] Adequate/Unchanged  [] Increased  [] Decreased      Sleep:       [x] Adequate/Unchanged  [] Fair  [] Poor      Group Attendance on Unit:   [x] Yes   [] Selectively    [] No    Compliant with scheduled medications: [x] Yes  [] No    Received emergency medications in past 24 hrs: [] Yes   [x] No    Medication Side Effects: Denies         Mental Status Exam  Level of consciousness: Awake and alert  Appearance:  Appropriate attire, resting in bed, fair grooming   Behavior/Motor: Approachable, engages with interviewer, no psychomotor abnormalities  Attitude toward examiner: Cooperative, good eye contact, attentive  Speech: normal rate, volume, tone  Mood: \"Good\"  Affect: Mood congruent  Thought processes: Linear and coherent  Thought content: Denies suicidal ideations              Denies homicidal ideations

## 2023-12-31 NOTE — DISCHARGE INSTRUCTIONS
Information:  Medications:   Medication summary provided   I understand that I should take only the medications on my list.     -why and when I need to take each medicine.     -which side effects to watch for.     -that I should carry my medication information at all times in case of     Emergency situations.    I will take all of my medicines to follow up appointments.     -check with my physician or pharmacist before taking any new    Medication, over the counter product or drink alcohol.    -Ask about food, drug or dietary supplement interactions.    -discard old lists and update records with medication providers.    Notify Physician:  Notify physician if you notice:   Always call 911 if you feel your life is in danger  In case of an emergency call 911 immediately!  If 911 is not available call your local emergency medical system for help    Behavioral Health Follow Up:  Original Referral Source:er  Discharge Diagnosis: Depression with suicidal ideation [F32.A, R45.851]  Recommendations for Level of Care: follow up  Patient status at discharge: stable  My hospital  was: [facility]  Aftercare plan faxed: [facility]   -faxed by: writer   -date: 1/1/2024   -time: 1100  Prescriptions: escribed to own pharmacy    Smoking: Quit Smoking.   Call the [employer]'s smoking quitline at 6-823-15U-QUIT  Know the signs of a heart attack   If you have any of the following symptoms call 911 immediately, do not wait more    Than five minutes.    1. Pressure, fullness and/ or squeezing in the center of the chest spreading to    The jaw, neck or shoulder.    2. Chest discomfort with light headedness, fainting, sweating, nausea or    Shortness of breath.   3. Upper abdominal pressure or discomfort.   4. Lower chest pain, back pain, unusual fatigue, shortness of breath, nausea   Or dizziness.     General Information:   Questions regarding your bill: Call HELP program [phone]     Suicide Hotline ([facility] Crisis Care Line)

## 2023-12-31 NOTE — GROUP NOTE
Group Therapy Note    Date: 12/31/2023    Group Start Time: 0900  Group End Time: 1000  Group Topic: Community Meeting    Isabell Michel LPN        Group Therapy Note    Attendees: 9/20 (2 D/C'd)       Patient's Goal:  Call daughter    Status After Intervention:  Improved    Participation Level: Active Listener and Interactive    Participation Quality: Appropriate, Attentive, Sharing, and Supportive      Speech:  normal      Thought Process/Content: Logical      Affective Functioning: Flat      Mood: anxious      Level of consciousness:  Alert, Oriented x4, and Attentive      Response to Learning: Able to verbalize current knowledge/experience, Able to verbalize/acknowledge new learning, Capable of insight, and Able to change behavior      Endings: None Reported    Modes of Intervention: Education, Support, Socialization, Problem-solving, Activity, and Limit-setting      Discipline Responsible: Licensed Practical Nurse      Signature:  Isabell Molina LPN

## 2024-01-01 VITALS
SYSTOLIC BLOOD PRESSURE: 123 MMHG | WEIGHT: 195.5 LBS | BODY MASS INDEX: 35.98 KG/M2 | RESPIRATION RATE: 14 BRPM | TEMPERATURE: 97.8 F | OXYGEN SATURATION: 99 % | HEIGHT: 62 IN | HEART RATE: 100 BPM | DIASTOLIC BLOOD PRESSURE: 75 MMHG

## 2024-01-01 PROCEDURE — 99239 HOSP IP/OBS DSCHRG MGMT >30: CPT | Performed by: PSYCHIATRY & NEUROLOGY

## 2024-01-01 PROCEDURE — 6370000000 HC RX 637 (ALT 250 FOR IP)

## 2024-01-01 RX ADMIN — Medication 1 TABLET: at 08:15

## 2024-01-01 ASSESSMENT — PAIN SCALES - GENERAL: PAINLEVEL_OUTOF10: 0

## 2024-01-01 NOTE — PROGRESS NOTES
[facility] [place]  Discharge Note    Pt discharged with followings belongings:   Dental Appliances: None  Vision - Corrective Lenses: None  Hearing Aid: None  Jewelry: Bracelet, Earrings, Ring  Body Piercings Removed: No  Clothing: Footwear, Jacket/Coat, Pants, Shirt, Shorts, Undergarments, Socks  Other Valuables: Purse, Other (Comment) (6 vape pens)   Valuables sent home with pt or returned to patient. Patient educated on aftercare instructions: yes  Information faxed to [facility] by writer  at 10:37 AM .Patient verbalize understanding of AVS:  yes.    Status EXAM upon discharge:  Mental Status and Behavioral Exam  Normal: Yes  Level of Assistance: Independent/Self  Facial Expression: Brightened  Affect: Appropriate  Level of Consciousness: Alert  Frequency of Checks: 4 times per hour, close  Mood:Normal: Yes  Mood: Depressed, Anxious  Motor Activity:Normal: Yes  Motor Activity: Decreased  Eye Contact: Fair  Observed Behavior: Cooperative  Sexual Misconduct History: Current - no  Preception: [place] to person, [place] to time, [place] to place, [place] to situation  Attention:Normal: Yes  Attention: Unable to concentrate  Thought Processes: Unremarkable  Thought Content:Normal: Yes  Depression Symptoms: No problems reported or observed.  Anxiety Symptoms: No problems reported or observed.  Andreea Symptoms: No problems reported or observed.  Hallucinations: None  Delusions: No  Memory:Normal: Yes  Insight and Judgment: Yes  Insight and Judgment: Poor judgment, Poor insight    Tobacco Screening:  Practical Counseling, on admission, timmy X, if applicable and completed (first 3 are required if patient doesn't refuse):            ( ) Recognizing danger situations (included triggers and roadblocks)                    ( ) Coping skills (new ways to manage stress,relaxation techniques, changing routine, distraction)                                                           ( ) Basic information about quitting (benefits of

## 2024-01-01 NOTE — GROUP NOTE
Group Therapy Note    Date: 1/1/2024    Group Start Time: 0900  Group End Time: 0930  Group Topic: Community Meeting    Barbara Qureshi RN        Group Therapy Note    Attendees: 11/22       Patient's Goal:  be discharged     Notes:  Goals group    Status After Intervention:  Unchanged    Participation Level: Active Listener and Interactive    Participation Quality: Appropriate, Attentive, Sharing, and Supportive      Speech:  normal      Thought Process/Content: Logical  Linear      Affective Functioning: Congruent      Mood: euthymic      Level of consciousness:  Alert, Oriented x4, and Attentive      Response to Learning: Able to verbalize/acknowledge new learning and Progressing to goal      Endings: None Reported    Modes of Intervention: Support      Discipline Responsible: Registered Nurse      Signature:  Barbara Potter RN
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
## 2024-01-01 NOTE — PROGRESS NOTES
Patient given tobacco quitline number 95455776323 at this time, refusing to call at this time, states \" I just dont want to quit now\"- patient given information as to the dangers of long term tobacco use. Continue to reinforce the importance of tobacco cessation.
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
## 2024-01-02 NOTE — CARE COORDINATION
Name: Sarah Meneses    : 2000    Discharge Date: 2024    Primary Auth/Cert #: [id number]     Destination: [facility]    *If you have any specific discharge questions, please contact the assigned /discharge planner: Yareli ([phone])      Discharge Medications:      Medication List        CONTINUE taking these medications      docusate 100 MG Caps  Commonly known as: COLACE, DULCOLAX  Notes to patient: Stool softener     PRENATAL 1+1 PO  Notes to patient: Vitamin            STOP taking these medications      ibuprofen 800 MG tablet  Commonly known as: ADVIL;MOTRIN     sertraline 100 MG tablet  Commonly known as: ZOLOFT     sertraline 50 MG tablet  Commonly known as: ZOLOFT              Follow Up Appointment: [facility]  [address] [place] [address] [phone]  Go on 2024  All services to be provided at facility.

## 2024-05-20 ENCOUNTER — HOSPITAL ENCOUNTER (OUTPATIENT)
Age: 24
Setting detail: OBSERVATION
Discharge: HOME | End: 2024-05-20
Attending: STUDENT IN AN ORGANIZED HEALTH CARE EDUCATION/TRAINING PROGRAM | Admitting: STUDENT IN AN ORGANIZED HEALTH CARE EDUCATION/TRAINING PROGRAM
Payer: COMMERCIAL

## 2024-05-20 VITALS
RESPIRATION RATE: 16 BRPM | HEIGHT: 62 IN | OXYGEN SATURATION: 98 % | SYSTOLIC BLOOD PRESSURE: 118 MMHG | TEMPERATURE: 98.7 F | BODY MASS INDEX: 34.96 KG/M2 | DIASTOLIC BLOOD PRESSURE: 64 MMHG | HEART RATE: 104 BPM | WEIGHT: 190 LBS

## 2024-05-20 PROBLEM — O47.03 THREATENED PRETERM LABOR, THIRD TRIMESTER: Status: ACTIVE | Noted: 2024-05-20

## 2024-05-20 PROBLEM — Z3A.31 31 WEEKS GESTATION OF PREGNANCY: Status: ACTIVE | Noted: 2024-05-20

## 2024-05-20 LAB
ABO + RH BLD: NORMAL
AMPHET UR QL SCN: NEGATIVE
ARM BAND NUMBER: NORMAL
BACTERIA URNS QL MICRO: ABNORMAL
BARBITURATES UR QL SCN: NEGATIVE
BASOPHILS # BLD: 0.05 K/UL (ref 0–0.2)
BASOPHILS NFR BLD: 0 % (ref 0–2)
BENZODIAZ UR QL: NEGATIVE
BILIRUB UR QL STRIP: NEGATIVE
BLOOD BANK SAMPLE EXPIRATION: NORMAL
BLOOD GROUP ANTIBODIES SERPL: NEGATIVE
CANDIDA SPECIES: POSITIVE
CANNABINOIDS UR QL SCN: NEGATIVE
CASTS #/AREA URNS LPF: ABNORMAL /LPF (ref 0–8)
CLARITY UR: ABNORMAL
COCAINE UR QL SCN: NEGATIVE
COLOR UR: YELLOW
EOSINOPHIL # BLD: 0.1 K/UL (ref 0–0.44)
EOSINOPHILS RELATIVE PERCENT: 1 % (ref 1–4)
EPI CELLS #/AREA URNS HPF: ABNORMAL /HPF (ref 0–5)
ERYTHROCYTE [DISTWIDTH] IN BLOOD BY AUTOMATED COUNT: 14.6 % (ref 11.8–14.4)
FENTANYL UR QL: NEGATIVE
GARDNERELLA VAGINALIS: NEGATIVE
GLUCOSE UR STRIP-MCNC: NEGATIVE MG/DL
HBV SURFACE AG SERPL QL IA: NONREACTIVE
HCT VFR BLD AUTO: 31.8 % (ref 36.3–47.1)
HCV AB SERPL QL IA: NONREACTIVE
HGB BLD-MCNC: 10.1 G/DL (ref 11.9–15.1)
HGB UR QL STRIP.AUTO: ABNORMAL
HIV 1+2 AB+HIV1 P24 AG SERPL QL IA: NONREACTIVE
IMM GRANULOCYTES # BLD AUTO: 0.25 K/UL (ref 0–0.3)
IMM GRANULOCYTES NFR BLD: 2 %
KETONES UR STRIP-MCNC: NEGATIVE MG/DL
LEUKOCYTE ESTERASE UR QL STRIP: ABNORMAL
LYMPHOCYTES NFR BLD: 1.86 K/UL (ref 1.1–3.7)
LYMPHOCYTES RELATIVE PERCENT: 14 % (ref 24–43)
MCH RBC QN AUTO: 29 PG (ref 25.2–33.5)
MCHC RBC AUTO-ENTMCNC: 31.8 G/DL (ref 28.4–34.8)
MCV RBC AUTO: 91.4 FL (ref 82.6–102.9)
METHADONE UR QL: NEGATIVE
MONOCYTES NFR BLD: 0.45 K/UL (ref 0.1–1.2)
MONOCYTES NFR BLD: 3 % (ref 3–12)
NEUTROPHILS NFR BLD: 80 % (ref 36–65)
NEUTS SEG NFR BLD: 10.71 K/UL (ref 1.5–8.1)
NITRITE UR QL STRIP: NEGATIVE
NRBC BLD-RTO: 0 PER 100 WBC
OPIATES UR QL SCN: NEGATIVE
OXYCODONE UR QL SCN: NEGATIVE
PCP UR QL SCN: NEGATIVE
PH UR STRIP: 7.5 [PH] (ref 5–8)
PLATELET # BLD AUTO: 160 K/UL (ref 138–453)
PMV BLD AUTO: 10.5 FL (ref 8.1–13.5)
PROT UR STRIP-MCNC: NEGATIVE MG/DL
RBC # BLD AUTO: 3.48 M/UL (ref 3.95–5.11)
RBC # BLD: ABNORMAL 10*6/UL
RBC #/AREA URNS HPF: ABNORMAL /HPF (ref 0–4)
RUBV IGG SERPL QL IA: 70.2 IU/ML
SOURCE: ABNORMAL
SP GR UR STRIP: 1.01 (ref 1–1.03)
T PALLIDUM AB SER QL IA: REACTIVE
TEST INFORMATION: NORMAL
TRICHOMONAS: NEGATIVE
UROBILINOGEN UR STRIP-ACNC: NORMAL EU/DL (ref 0–1)
WBC #/AREA URNS HPF: ABNORMAL /HPF (ref 0–5)
WBC OTHER # BLD: 13.4 K/UL (ref 3.5–11.3)

## 2024-05-20 PROCEDURE — 86780 TREPONEMA PALLIDUM: CPT

## 2024-05-20 PROCEDURE — 86762 RUBELLA ANTIBODY: CPT

## 2024-05-20 PROCEDURE — 85025 COMPLETE CBC W/AUTO DIFF WBC: CPT

## 2024-05-20 PROCEDURE — G0379 DIRECT REFER HOSPITAL OBSERV: HCPCS

## 2024-05-20 PROCEDURE — 87340 HEPATITIS B SURFACE AG IA: CPT

## 2024-05-20 PROCEDURE — 86901 BLOOD TYPING SEROLOGIC RH(D): CPT

## 2024-05-20 PROCEDURE — 87491 CHLMYD TRACH DNA AMP PROBE: CPT

## 2024-05-20 PROCEDURE — 87591 N.GONORRHOEAE DNA AMP PROB: CPT

## 2024-05-20 PROCEDURE — 99213 OFFICE O/P EST LOW 20 MIN: CPT

## 2024-05-20 PROCEDURE — 86593 SYPHILIS TEST NON-TREP QUANT: CPT

## 2024-05-20 PROCEDURE — 87480 CANDIDA DNA DIR PROBE: CPT

## 2024-05-20 PROCEDURE — 87510 GARDNER VAG DNA DIR PROBE: CPT

## 2024-05-20 PROCEDURE — 86900 BLOOD TYPING SEROLOGIC ABO: CPT

## 2024-05-20 PROCEDURE — 87081 CULTURE SCREEN ONLY: CPT

## 2024-05-20 PROCEDURE — 81001 URINALYSIS AUTO W/SCOPE: CPT

## 2024-05-20 PROCEDURE — 86850 RBC ANTIBODY SCREEN: CPT

## 2024-05-20 PROCEDURE — 87086 URINE CULTURE/COLONY COUNT: CPT

## 2024-05-20 PROCEDURE — 6370000000 HC RX 637 (ALT 250 FOR IP): Performed by: STUDENT IN AN ORGANIZED HEALTH CARE EDUCATION/TRAINING PROGRAM

## 2024-05-20 PROCEDURE — 80307 DRUG TEST PRSMV CHEM ANLYZR: CPT

## 2024-05-20 PROCEDURE — 99214 OFFICE O/P EST MOD 30 MIN: CPT | Performed by: STUDENT IN AN ORGANIZED HEALTH CARE EDUCATION/TRAINING PROGRAM

## 2024-05-20 PROCEDURE — 86803 HEPATITIS C AB TEST: CPT

## 2024-05-20 PROCEDURE — G0378 HOSPITAL OBSERVATION PER HR: HCPCS

## 2024-05-20 PROCEDURE — 86403 PARTICLE AGGLUT ANTBDY SCRN: CPT

## 2024-05-20 PROCEDURE — 36415 COLL VENOUS BLD VENIPUNCTURE: CPT

## 2024-05-20 PROCEDURE — 87389 HIV-1 AG W/HIV-1&-2 AB AG IA: CPT

## 2024-05-20 PROCEDURE — 87660 TRICHOMONAS VAGIN DIR PROBE: CPT

## 2024-05-20 RX ORDER — FLUCONAZOLE 50 MG/1
150 TABLET ORAL ONCE
Status: COMPLETED | OUTPATIENT
Start: 2024-05-20 | End: 2024-05-20

## 2024-05-20 RX ORDER — CEPHALEXIN 500 MG/1
500 CAPSULE ORAL 4 TIMES DAILY
Qty: 28 CAPSULE | Refills: 0 | Status: SHIPPED | OUTPATIENT
Start: 2024-05-20 | End: 2024-05-27

## 2024-05-20 RX ORDER — ACETAMINOPHEN 500 MG
1000 TABLET ORAL EVERY 6 HOURS PRN
Status: DISCONTINUED | OUTPATIENT
Start: 2024-05-20 | End: 2024-05-20 | Stop reason: HOSPADM

## 2024-05-20 RX ADMIN — FLUCONAZOLE 150 MG: 50 TABLET ORAL at 12:41

## 2024-05-20 NOTE — PROGRESS NOTES
Obstetric/Gynecology Resident Interval Note    Discussed with patient results of labs today including positive vaginitis for yeast infection.  She was given 1 dose of Diflucan here.  Urine was also suspicious for UTI.  Keflex sent to the patient's preferred pharmacy.  Talked with the patient about close follow-up with her primary OB provider, patient reports that she will be following up with her [facility] provider as she has been.  Close evaluation of her records shows that she has not received any prenatal labs at this point so they were drawn during today's admission.  Patient feels comfortable going home at this time due to her negative exam regarding rupture of membranes and labor.  Will discharge.    Patient may be discharged to home. Patient counseled on adequate PO hydration and fetal kick counts. All questions and concerns addressed. Patient is aware that she should return to the hospital if she has worsening contractions, LOF, VB or decreased fetal movements. She voices understanding. Patient is aware that she should return to hospital if she experiences unrelenting headache, vision changes, RUQ pain, nausea, vomiting, and peripheral edema. She voices understanding.      Garima Catherine MD  OB/GYN Resident, PGY1  [place], [place]  5/20/2024, 1:53 PM

## 2024-05-20 NOTE — FLOWSHEET NOTE
Discharge paperwork dicussed with pt. And verbalized understanding with no questions. Pt. Discharged home with FOB. Pt. Ambulated off unit home.

## 2024-05-20 NOTE — FLOWSHEET NOTE
Pt. Presented to L&D via squad for CTX. Pt. Stated that CTX started at 0600 5/20/24. Pt. Stated that she had a big gush of fluid at 0800 but no continuous leaking. Pt. Has Negative Bloody-show and Positive FM.

## 2024-05-20 NOTE — DISCHARGE INSTRUCTIONS
You have been evaluated in the Labor and Deliver Unit at [facility] 5/20/2024     Your recommendations and if necessary prescriptions from today's visit:   -Take medication as prescribed  -Follow-up with your OBGYN, if you do not have one please ensure that you follow-up with referral above   -Take antibiotics for UTI    Should you have any questions regarding your care or further treatment, please call [facility] Emergency Department at [phone].    PLEASE RETURN TO THE EMERGENCY DEPARTMENT IMMEDIATELY for   -Leakage of fluid  -Vaginal bleeding  -Counterattractions that are regular and feeling like they are becoming closer and closer together.  -Feeling like baby is moving less than normal  -Headache, change in vision, chest pain, shortness of breath  -Changing symptoms  -Worsening symptoms  -Unable to follow up with your primary care provider  -Any other care or concern

## 2024-05-20 NOTE — H&P
appearing vulva, no masses, tenderness or lesions, normal clitoris   Vagina: Normal appearing vaginal mucosa without lesions, NO obvious vaginal discharge noted in the posterior vault   Cervix: Normal appearing cervix without lesions, difficulty observing external os due to anterior position, no lacerations or abnormal lesions visualized  Sterile Vaginal Exam:  Cervix: No cervical motion tenderness   Uterus: Is gravid, Normal size, shape, consistency and non-tender   Adnexa: Non-tender, no palpable masses  Cervix: Fingertip dilated, 30 % effaced, -3 station    DATA:  Membranes Ruptured: No  Fern: negative  Nitrazine: Negative  Valsalva/Pooling: absent  Vaginal Bleeding: absent    Wet prep: Clue cells Absent , Trichomonas Absent   KOH:  Not Performed  Whiff Test: Not Performed     LIMITED BEDSIDE US:    NOT PERFORMED      PRENATAL LAB RESULTS:    Blood Type/Rh: A neg   A NEG   Lab Results   Component Value Date/Time    ABORH A NEG 01/25/2023 06:00 AM     Antibody Screen: negative  Antibody Screen   Date Value Ref Range Status   01/25/2023 NEG  Final      Hemoglobin, Hematocrit, Platelets: 12.9/38.7/202 *As of 12/28/2023  Hemoglobin   Date Value Ref Range Status   12/28/2023 12.9 12.0 - 16.0 g/dL Final   01/25/2023 10.3 (L) 11.5 - 15.5 g/dL Final   01/20/2023 10.4 (L) 11.5 - 15.5 g/dL Final     Hematocrit   Date Value Ref Range Status   12/28/2023 38.7 36 - 46 % Final   01/25/2023 31.6 (L) 34.0 - 48.0 % Final   01/20/2023 32.8 (L) 34.0 - 48.0 % Final     MCV   Date Value Ref Range Status   12/28/2023 82.9 80 - 100 fL Final   01/25/2023 85.6 80.0 - 99.9 fL Final   01/20/2023 86.5 80.0 - 99.9 fL Final      Platelets   Date Value Ref Range Status   12/28/2023 202 150 - 450 k/uL Final   01/25/2023 189 130 - 450 E9/L Final   01/20/2023 193 130 - 450 E9/L Final        Rubella: unknown    No results found for: \"RUBG\"    T. Pallidum, IgG: unknown  No results found for: \"TREPG\"     Hepatitis B Surface Antigen: NEGATIVE *As of

## 2024-05-21 LAB
C TRACH DNA SPEC QL PROBE+SIG AMP: NEGATIVE
MICROORGANISM SPEC CULT: ABNORMAL
MICROORGANISM SPEC CULT: ABNORMAL
N GONORRHOEA DNA SPEC QL PROBE+SIG AMP: NEGATIVE
SPECIMEN DESCRIPTION: ABNORMAL
SPECIMEN DESCRIPTION: ABNORMAL
SPECIMEN DESCRIPTION: NORMAL
T PALLIDUM AB SER QL HA: REACTIVE
VDRL SER-TITR: NONREACTIVE {TITER}

## 2024-05-22 ENCOUNTER — TELEPHONE (OUTPATIENT)
Dept: OBGYN | Age: 24
End: 2024-05-22

## 2024-05-22 DIAGNOSIS — R84.9 ABNORMAL RESPIRATORY LABORATORY RESULTS: Primary | ICD-10-CM

## 2024-05-22 NOTE — TELEPHONE ENCOUNTER
Attempted to call patient to discuss positive TPAL. Unable to get call to go through. Will attempt later.    Nadiya Rodríguez DO, PGY-4  Ob/Gyn Resident  [facility]  05/22/24

## 2024-05-23 PROBLEM — B95.1 GBS (GROUP B STREPTOCOCCUS) UTI COMPLICATING PREGNANCY, THIRD TRIMESTER: Status: ACTIVE | Noted: 2024-05-23

## 2024-05-23 PROBLEM — O23.43 GBS (GROUP B STREPTOCOCCUS) UTI COMPLICATING PREGNANCY, THIRD TRIMESTER: Status: ACTIVE | Noted: 2024-05-23

## 2024-07-04 ENCOUNTER — HOSPITAL ENCOUNTER (OUTPATIENT)
Age: 24
Discharge: HOME | End: 2024-07-04
Attending: OBSTETRICS & GYNECOLOGY | Admitting: OBSTETRICS & GYNECOLOGY
Payer: COMMERCIAL

## 2024-07-04 VITALS
SYSTOLIC BLOOD PRESSURE: 112 MMHG | RESPIRATION RATE: 16 BRPM | OXYGEN SATURATION: 95 % | HEART RATE: 118 BPM | DIASTOLIC BLOOD PRESSURE: 57 MMHG | TEMPERATURE: 98.1 F

## 2024-07-04 PROBLEM — Z3A.31 31 WEEKS GESTATION OF PREGNANCY: Status: RESOLVED | Noted: 2024-05-20 | Resolved: 2024-07-04

## 2024-07-04 PROBLEM — F33.9: Status: RESOLVED | Noted: 2023-12-28 | Resolved: 2024-07-04

## 2024-07-04 PROBLEM — Z3A.37 37 WEEKS GESTATION OF PREGNANCY: Status: ACTIVE | Noted: 2024-07-04

## 2024-07-04 LAB

## 2024-07-04 PROCEDURE — 81001 URINALYSIS AUTO W/SCOPE: CPT

## 2024-07-04 PROCEDURE — 99203 OFFICE O/P NEW LOW 30 MIN: CPT

## 2024-07-04 PROCEDURE — 6370000000 HC RX 637 (ALT 250 FOR IP)

## 2024-07-04 RX ORDER — ACETAMINOPHEN 500 MG
1000 TABLET ORAL EVERY 8 HOURS SCHEDULED
Status: DISCONTINUED | OUTPATIENT
Start: 2024-07-04 | End: 2024-07-04

## 2024-07-04 RX ORDER — ACETAMINOPHEN 500 MG
1000 TABLET ORAL EVERY 8 HOURS SCHEDULED
Status: DISCONTINUED | OUTPATIENT
Start: 2024-07-04 | End: 2024-07-05 | Stop reason: HOSPADM

## 2024-07-04 RX ADMIN — ACETAMINOPHEN 1000 MG: 500 TABLET ORAL at 19:49

## 2024-07-04 ASSESSMENT — PAIN SCALES - GENERAL: PAINLEVEL_OUTOF10: 7

## 2024-07-04 NOTE — H&P
OBSTETRICAL HISTORY AND PHYSICAL  [facility]    Date: 2024       Time: 10:46 PM   Patient Name: Sarah Meneses     Patient : 2000  Room/Bed: [place] [address]    Admission Date/Time: 2024  7:19 PM      CC: Contractions     HPI: Sarah Meneses is a 23 y.o.  at 37w4d who presents for contractions. Patient started feeling contractions around 1700 every 2-4 minutes. Patient denies any fever, chills, N/V, headaches, vision changes, chest pain, shortness of breath, RUQ pain, and increased swelling/tenderness in bilateral lower extremities. Patient denies any vaginal discharge and any urinary complaints. The patient reports fetal movement is present, complains of contractions, denies loss of fluid, denies vaginal bleeding.    DATING:  LMP: Patient's last menstrual period was 10/15/2023.  Estimated Date of Delivery: 24   Based on: LMP    PREGNANCY RISK FACTORS:  Patient Active Problem List   Diagnosis    Depression with suicidal ideation    Bipolar disorder, curr episode mixed, severe, w/o psychotic features ([facility])    Pregnancy    Bipolar 1 disorder ([facility])    Polysubstance abuse ([facility])    ADHD (attention deficit hyperactivity disorder), combined type    Threatened  labor, third trimester    GBS (group b Streptococcus) UTI complicating pregnancy, third trimester    37 weeks gestation of pregnancy        Steroids Given In This Pregnancy:  no     REVIEW OF SYSTEMS:  Constitutional: negative fever, negative chills  HEENT: negative visual disturbances, negative headaches  Respiratory: negative dyspnea, negative cough  Cardiovascular: negative chest pain,  negative palpitations  Gastrointestinal: positive abdominal pain, negative RUQ pain, negative N/V, negative diarrhea, negative constipation  Genitourinary: negative dysuria, negative vaginal discharge, negative vaginal bleeding  Dermatological: negative rash  Hematologic: negative bruising  Immunologic/Lymphatic:  negative recent illness, negative recent sick contact  Musculoskeletal: negative back pain, negative myalgias, negative arthralgias  Neurological:  negative dizziness, negative weakness  Behavior/Psych: negative depression, negative anxiety    OBSTETRIC HISTORY:   OB History    Para Term  AB Living   3 2 2 0 0 2   SAB IAB Ectopic Molar Multiple Live Births   0 0 0 0 0 2      # Outcome Date GA Lbr Remi/2nd Weight Sex Delivery Anes PTL Lv   3 Current            2 Term 23 39w0d  3.572 kg (7 lb 14 oz) F Vag-Spont EPI N REZA      Name: JOAO,BABY GIRL CHRISTOPHER      Apgar1: 7  Apgar5: 9   1 Term 21 39w0d   F Vag-Spont   REZA       PAST MEDICAL HISTORY:   has a past medical history of ADHD, Autism, Bipolar 1 disorder (HCC), and PTSD (post-traumatic stress disorder).    PAST SURGICAL HISTORY:   has no past surgical history on file.    ALLERGIES:  is allergic to shellfish-derived products and fish-derived products.    MEDICATIONS:  Prior to Admission medications    Medication Sig Start Date End Date Taking? Authorizing Provider   docusate (COLACE, DULCOLAX) 100 MG CAPS Take 100 mg by mouth daily 21   ProviderTaylor MD   Prenatal Vit-Fe Fumarate-FA (PRENATAL 1+1 PO) Take 1 tablet by mouth Daily    ProviderTaylor MD       FAMILY HISTORY:  family history is not on file.    SOCIAL HISTORY:   reports that she has been smoking cigarettes. She has been exposed to tobacco smoke. She has never used smokeless tobacco. She reports that she does not currently use drugs. She reports that she does not drink alcohol.    VITALS:  Vitals:    24 1926   BP: (!) 112/57   Pulse: (!) 118   Resp: 16   Temp: 98.1 °F (36.7 °C)   SpO2: 95%       PHYSICAL EXAM:  Fetal Heart Monitor:  Baseline Heart Rate 145, moderate variability, present accelerations, absent decelerations  Krebs: irregular, every 2-6 minutes contractions    General appearance:  no apparent distress, alert, and cooperative  HEENT: head